# Patient Record
Sex: MALE | Race: WHITE | NOT HISPANIC OR LATINO | Employment: FULL TIME | URBAN - METROPOLITAN AREA
[De-identification: names, ages, dates, MRNs, and addresses within clinical notes are randomized per-mention and may not be internally consistent; named-entity substitution may affect disease eponyms.]

---

## 2018-09-07 ENCOUNTER — OFFICE VISIT (OUTPATIENT)
Dept: FAMILY MEDICINE | Facility: CLINIC | Age: 53
End: 2018-09-07
Payer: COMMERCIAL

## 2018-09-07 VITALS
SYSTOLIC BLOOD PRESSURE: 126 MMHG | WEIGHT: 279.31 LBS | HEART RATE: 84 BPM | DIASTOLIC BLOOD PRESSURE: 78 MMHG | BODY MASS INDEX: 39.1 KG/M2 | TEMPERATURE: 98 F | OXYGEN SATURATION: 91 % | HEIGHT: 71 IN

## 2018-09-07 DIAGNOSIS — Z72.0 CHEWS TOBACCO REGULARLY: ICD-10-CM

## 2018-09-07 DIAGNOSIS — K13.21 LEUKOPLAKIA, TONGUE: Primary | ICD-10-CM

## 2018-09-07 DIAGNOSIS — E66.9 OBESITY (BMI 35.0-39.9 WITHOUT COMORBIDITY): ICD-10-CM

## 2018-09-07 PROCEDURE — 99999 PR PBB SHADOW E&M-NEW PATIENT-LVL IV: CPT | Mod: PBBFAC,,, | Performed by: FAMILY MEDICINE

## 2018-09-07 PROCEDURE — 99204 OFFICE O/P NEW MOD 45 MIN: CPT | Mod: S$GLB,,, | Performed by: FAMILY MEDICINE

## 2018-09-07 RX ORDER — OXYBUTYNIN CHLORIDE 5 MG/1
1 TABLET ORAL NIGHTLY
Refills: 10 | COMMUNITY
Start: 2018-07-16 | End: 2019-01-18

## 2018-09-07 NOTE — PROGRESS NOTES
Subjective:       Patient ID: Levy Mckoy is a 53 y.o. male.    Chief Complaint: tongue pain        History reviewed. No pertinent past medical history.    Lesion on the tongue; Present for One and half week. Sensitive and hurts. No change  Risk factor: He chews tobacco and has been since he was a teenager.      Family History   Problem Relation Age of Onset    Diabetes Father      Social History     Socioeconomic History    Marital status:      Spouse name: Not on file    Number of children: Not on file    Years of education: Not on file    Highest education level: Not on file   Social Needs    Financial resource strain: Not on file    Food insecurity - worry: Not on file    Food insecurity - inability: Not on file    Transportation needs - medical: Not on file    Transportation needs - non-medical: Not on file   Occupational History    Not on file   Tobacco Use    Smoking status: Never Smoker   Substance and Sexual Activity    Alcohol use: Yes     Comment: very rare    Drug use: No    Sexual activity: Yes     Partners: Female   Other Topics Concern    Not on file   Social History Narrative    Not on file     Outpatient Encounter Medications as of 9/7/2018   Medication Sig Dispense Refill    oxybutynin (DITROPAN) 5 MG Tab Take 1 tablet by mouth every evening.  10    [DISCONTINUED] hydrocodone-acetaminophen 5-325mg (NORCO) 5-325 mg per tablet        No facility-administered encounter medications on file as of 9/7/2018.        Review of Systems   Constitutional: Negative for appetite change and fever.   HENT: Negative for congestion, facial swelling and voice change.    Eyes: Negative for discharge and itching.   Respiratory: Negative for cough, chest tightness and wheezing.    Cardiovascular: Negative.  Negative for chest pain and leg swelling.   Gastrointestinal: Negative for abdominal pain, nausea and vomiting.   Endocrine: Negative for cold intolerance and heat intolerance.  "  Genitourinary: Negative for dysuria and flank pain.   Musculoskeletal: Negative for myalgias and neck stiffness.   Skin: Negative for pallor and rash.   Neurological: Negative for facial asymmetry and weakness.   Psychiatric/Behavioral: Negative for agitation and confusion.       Objective:      /78 (BP Location: Right arm, Patient Position: Sitting, BP Method: Large (Manual))   Pulse 84   Temp 98.4 °F (36.9 °C) (Oral)   Ht 5' 11" (1.803 m)   Wt 126.7 kg (279 lb 5.2 oz)   SpO2 (!) 91%   BMI 38.96 kg/m²   Physical Exam   Constitutional: He is oriented to person, place, and time. He appears well-developed. No distress.   HENT:   Head: Normocephalic and atraumatic.   Right Ear: External ear normal.   Left Ear: External ear normal.   Eyes: Conjunctivae and EOM are normal.   Neck: Neck supple.   Cardiovascular: Normal rate and regular rhythm.   Pulmonary/Chest: Effort normal. No respiratory distress.   Abdominal: Soft. Normal appearance and bowel sounds are normal. There is no hepatosplenomegaly.   Genitourinary:   Genitourinary Comments: deferred   Musculoskeletal: He exhibits no edema.   Neurological: He is alert and oriented to person, place, and time.   Skin: Skin is warm and dry.   Tip of the tongue: .5mm diameter white colored lesion on the tip of the tongue.   No other ulcer noted in the mouth.   Psychiatric: He has a normal mood and affect. His behavior is normal.   Nursing note and vitals reviewed.      Results for orders placed or performed in visit on 10/19/11   CBC auto differential   Result Value Ref Range    WBC 7.97 4.8 - 10.8 K/uL    RBC 5.54 4.60 - 6.20 M/uL    Hemoglobin 14.5 14.0 - 18.0 gm/dl    Hematocrit 45.9 40.0 - 54.0 %    MCV 82.9 82 - 95 fL    MCH 26.2 (L) 27 - 31 pg    MCHC 31.6 (L) 32 - 36 %    RDW 13.9 11.5 - 14.5 %    Gran% 64.1 38 - 73 %    Lymph% 24.7 21 - 44 %    Mono% 10.7 (H) 0.0 - 7.4 %    Eosinophil% 0.1 0.0 - 4.2 %    Basophil% 0.4 0 - 1.9 %    Gran # (ANC) 5.1 1.8 - " 7.7 K/uL    Lymph # 2.0 1 - 4.8 K/uL    Mono # 0.9 (H) 0.0 - 0.8 K/uL    Eos # 0.0 0 - 0.45 K/uL    Baso # 0.0 0.0 - 0.2 K/uL    Platelets 355 (H) 150 - 350 K/uL    MPV 11.8 9.2 - 12.9 fL   Lipid panel   Result Value Ref Range    Cholesterol 225 (H) 120 - 199 mg/dL    Triglycerides 164 (H) 30 - 150 mg/dL    HDL 44 40 - 75 mg/dL    LDL Cholesterol 148.2 63 - 159 mg/dL    HDL/Chol Ratio 19.6 (L) 20 - 50 %    Total Cholesterol/HDL Ratio 5.1 (H) 2.0 - 5.0   ALT (SGPT)   Result Value Ref Range    ALT 32 10 - 44 U/L   Basic metabolic panel   Result Value Ref Range    BUN, Bld 19 6 - 20 mg/dl    Sodium 140 136 - 145 mmol/L    Potassium 4.0 3.5 - 5.1 mmol/L    Chloride 106 95 - 110 mmol/L    CO2 23 23.0 - 29.0 mmol/L    Glucose 91 70 - 110 mg/dl    Creatinine 1.3 0.5 - 1.4 mg/dl    Calcium 9.2 8.7 - 10.5 mg/dl    Anion Gap 16 8 - 16 mmol/L    eGFR if non African American 60 (A) >60 mL/min    eGFR if African American >60 >60 mL/min     Assessment:       1. Leukoplakia, tongue    2. Chews tobacco regularly    3. Obesity (BMI 35.0-39.9 without comorbidity)        Plan:   Leukoplakia, tongue/Tobacco/Obesity   New problem with unknown diagnosis  Advised to stop chewing tobacco as it increases risk of oral cancer.  -     Ambulatory consult to Oral Maxillofacial Surgery-for a biopsy  BMI-38.96-Advised to cut down on calories and loose weight.

## 2018-09-08 NOTE — PATIENT INSTRUCTIONS
What Are Oral Lesions? (Precancerous and Cancerous)  Precancerous oral lesions are abnormal cell growths in or around the mouth. They may become cancer. Cancerous oral lesions are life-threatening cell changes in the mouth. These lesions need to be found early to give you a better chance for a cure.  Signs and symptoms  The signs and symptoms of precancerous and cancerous oral lesions may include:  · A sore in the mouth that doesn`t heal within 2 weeks  · White or red lesions or ulcers on the tongue, gums, or lining of the mouth that don`t go away  · Tenderness or pain in the mouth that persists  Your evaluation  See your dental professional about any sore or pain in the mouth that doesn`t go away within 2 weeks. He or she will ask questions about your medical and dental history. Your entire mouth, including your lips and teeth, will be checked. A biopsy or other tests may also be done.  · A biopsy is the best way to find out if a lesion is precancerous or cancerous. During a biopsy, the area around the lesion will be numbed. A part of the lesion will then be removed and sent to a lab to be examined under a microscope.  · Other tests may be helpful in making the diagnosis. They include:  ¨ Staining. The area in your mouth around the lesion may be stained with a special dye. The dye binds to cancerous cells, staining only these cells. After a few hours, the color from the dye will disappear.  ¨ Cytology. Your dental professional may scrape the surface of the lesion to obtain cells. The cells are then sent to a lab, where they are examined for cancer.  Treatment  Your treatment will depend on the nature of the oral lesion. Your dental or medical professional can tell you about types of treatment, which may include:  · Surgery  · Radiation therapy  · Chemotherapy  · Combination therapy. A combination of surgery, radiation, and chemotherapy used to treat advanced cases of oral cancer.  Prevention  The best way to  catch problems early is to have regular oral checkups. To help reduce your risk for oral cancer, follow the tips below.  · Get oral checkups. Visit your dentist at least 2 times a year.  · Don`t use tobacco. Tobacco use increases the risk for oral cancer. It`s never too late to stop using tobacco.  · Limit alcohol. If you drink a lot of alcohol, you may be at a higher risk for oral cancer.  · Eat a healthy diet. A diet rich in fruits and vegetables may lower your risk for oral cancer.  · Use good oral hygiene. Brush and floss your teeth each day. If you wear dentures, keep them clean.  Date Last Reviewed: 7/15/2015  © 7005-8725 [employer]. [address] [place] [address], [place] [address]. All rights reserved. This information is not intended as a substitute for professional medical care. Always follow your healthcare professional's instructions.

## 2018-09-12 ENCOUNTER — TELEPHONE (OUTPATIENT)
Dept: FAMILY MEDICINE | Facility: CLINIC | Age: 53
End: 2018-09-12

## 2018-09-12 NOTE — TELEPHONE ENCOUNTER
Spoke with patient and informed him of appointment date and time with the oral maxillary surgeon, Dr. Renato Taylor 09/13/18 1:15p. Phone number given to patient in case he decided he did not need the appointment. See message below.

## 2018-09-12 NOTE — TELEPHONE ENCOUNTER
----- Message from Kaelyn Ochoa sent at 9/12/2018 10:19 AM CDT -----  Contact: pt   States he's rtn nurses call and states he did a home remedy and it's better and may cancel the appt with the Dentist and can be reached at [phone]//[email or web address]/dbw

## 2018-12-28 ENCOUNTER — OFFICE VISIT (OUTPATIENT)
Dept: FAMILY MEDICINE | Facility: CLINIC | Age: 53
End: 2018-12-28
Payer: COMMERCIAL

## 2018-12-28 VITALS
TEMPERATURE: 98 F | OXYGEN SATURATION: 96 % | HEIGHT: 71 IN | BODY MASS INDEX: 40.65 KG/M2 | SYSTOLIC BLOOD PRESSURE: 130 MMHG | HEART RATE: 99 BPM | WEIGHT: 290.38 LBS | DIASTOLIC BLOOD PRESSURE: 82 MMHG

## 2018-12-28 DIAGNOSIS — G47.30 SLEEP APNEA, UNSPECIFIED TYPE: ICD-10-CM

## 2018-12-28 DIAGNOSIS — Z00.00 ANNUAL PHYSICAL EXAM: Primary | ICD-10-CM

## 2018-12-28 DIAGNOSIS — Z12.11 COLON CANCER SCREENING: ICD-10-CM

## 2018-12-28 DIAGNOSIS — Z72.0 CHEWING TOBACCO USE: ICD-10-CM

## 2018-12-28 DIAGNOSIS — E66.01 MORBID OBESITY WITH BMI OF 40.0-44.9, ADULT: ICD-10-CM

## 2018-12-28 PROCEDURE — 99396 PREV VISIT EST AGE 40-64: CPT | Mod: S$GLB,,, | Performed by: FAMILY MEDICINE

## 2018-12-28 PROCEDURE — 99999 PR PBB SHADOW E&M-EST. PATIENT-LVL IV: CPT | Mod: PBBFAC,,, | Performed by: FAMILY MEDICINE

## 2018-12-28 NOTE — PROGRESS NOTES
Levy Mckoy 53 y.o. White male    HPI- The patient is presenting today for Annual Exam  52yo male presents today for annual examination. He expresses concern about his weight. Notes that he has had progressive gain of weight since March 2018 (30lbs). 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 He does not exercise routinely. He has not had any consistent health care. Reports stable vision and hearing. Has poor sleep patterns and admits to snoring as well as witnessed apnea. Several years ago he was diagnosed with JULI. He lost weight and symptoms improved. Is interested in pursuing updated sleep study. Is followed by non-[facility] for BPH symptoms. Multiple medications have been prescribed and he has had poor tolerability to treatment. He has never had colonoscopy.     Past Medical History:   Diagnosis Date    BPH (benign prostatic hyperplasia)      Past Surgical History:   Procedure Laterality Date    APPENDECTOMY      CYST REMOVAL      right ankle       Family History   Problem Relation Age of Onset    Diabetes Father      Current Outpatient Medications   Medication Sig Dispense Refill    oxybutynin (DITROPAN) 5 MG Tab Take 1 tablet by mouth every evening.  10     No current facility-administered medications for this visit.      Allergies-  Review of patient's allergies indicates:  No Known Allergies    ROS-   CONSTITUTIONAL- No fever, chills, fatigue or weight loss  HEENT- No vision changes, ear pain, hearing loss  CHEST- No cough, shortness of breath  CV- No chest pain, palpitations, edema  Abdomen- No abdominal pain, change in bowel habits, blood in stool  - No change in urination, no dysuria, no hematuria  Neurologic- No headaches, dizziness, weakness  Musculoskeletal- No joint pain, no back pain, no myalgias  Endocrine- No polydypsia, polyphagia or polyuria, no heat or cold intolerance  Hematologic- No bruising or bleeding, no  "lymphadenopathy  Psych- No change in mood, no trouble with sleep  Integument- No rashes, no skin changes    /82   Pulse 99   Temp 98.3 °F (36.8 °C)   Ht 5' 11" (1.803 m)   Wt 131.7 kg (290 lb 5.5 oz)   SpO2 96%   BMI 40.49 kg/m²     PE-  CONSTITIONAL- Well developed, well nourished, no acute distress, obese  HEENT- Normal cephalic, atraumatic, PERRLA, right ear external- no abnormality, left ear external- no abnormality, right TM- normal to visualization, left TM- normal to visualization, moist mucus membranes, no oropharyngeal abnormality visualized nasal turbinates are clear  Neck- Full range of motion, no thyromegaly, no cervical lymphadenopathy  CV- Normal rate and rhythm, heart sounds normal, no murmurs, rubs or gallops  Chest- Breath sounds are normal and equal bilaterally, normal inspiratory and expiratory efforts  Abdomen- Bowel sounds are equal in all four quadrants, non tender to palpation, soft, no distention  Musculoskeletal- Normal ROM of the extremities, no tenderness  Neurologic- Alert, orientated x 3, cranial nerves intact  Skin- Warm and dry to touch, no rashes, no visible lesions  Psych- Mood and affect normal, Behavior normal    Assessment and Plan-  Annual physical exam  General health screening recommendations were reviewed with the patient in office today. Emphasized healthy eating and regular physical activity. Anticipatory guidance has been provided with regard to age and informational handouts have been given. Next well check is recommended in 1 year, rtc sooner for routine chronic care assessment.   -     TSH; Future; Expected date: 12/28/2018  -     Comprehensive metabolic panel; Future; Expected date: 12/28/2018  -     Lipid panel; Future; Expected date: 12/28/2018  -     Hemoglobin A1c; Future; Expected date: 12/28/2018  -     CBC auto differential; Future; Expected date: 12/28/2018    Sleep apnea, unspecified type  -     Ambulatory Referral to Pulmonology    Morbid obesity " with BMI of 40.0-44.9, adult  Weight loss efforts remain encouraged through diet and lifestyle measures.    Chewing tobacco use  Tobacco cessation is advised.    Colon cancer screening  -     Case request GI: COLONOSCOPY

## 2018-12-28 NOTE — PATIENT INSTRUCTIONS

## 2019-01-02 ENCOUNTER — TELEPHONE (OUTPATIENT)
Dept: FAMILY MEDICINE | Facility: CLINIC | Age: 54
End: 2019-01-02

## 2019-01-02 DIAGNOSIS — Z00.00 ANNUAL PHYSICAL EXAM: Primary | ICD-10-CM

## 2019-01-02 NOTE — TELEPHONE ENCOUNTER
External orders have been placed. Please let patient know it is his responsibility to ensure that I have received results as [facility].

## 2019-01-02 NOTE — TELEPHONE ENCOUNTER
----- Message from Acacia Neumann sent at 1/2/2019  1:30 PM CST -----  Contact: pt  Per the pt insurance he has to have his labs drawn at [facility], please fax the pt orders to [facility] no additional info given, the pt can be reached at [phone]///[email or web address]

## 2019-01-02 NOTE — TELEPHONE ENCOUNTER
Pt is scheduled to have labs on 1/4/18. But he has to have external and [facility]

## 2019-01-07 ENCOUNTER — TELEPHONE (OUTPATIENT)
Dept: FAMILY MEDICINE | Facility: CLINIC | Age: 54
End: 2019-01-07

## 2019-01-07 NOTE — TELEPHONE ENCOUNTER
----- Message from Marizol Melendez sent at 1/7/2019  4:15 PM CST -----  Contact: pt  He's calling stating that his lab work was completed at [facility] and in order for the results to be sent it has to be requested on [facility] letter head paper faxed to [phone], please advise [phone]

## 2019-01-07 NOTE — TELEPHONE ENCOUNTER
Spoke with patient letting him know that he can come in Friday when he gets back in town and sign a release so we can get the test results.

## 2019-01-11 ENCOUNTER — OFFICE VISIT (OUTPATIENT)
Dept: PULMONOLOGY | Facility: CLINIC | Age: 54
End: 2019-01-11
Payer: COMMERCIAL

## 2019-01-11 VITALS
OXYGEN SATURATION: 93 % | RESPIRATION RATE: 18 BRPM | BODY MASS INDEX: 41.12 KG/M2 | HEART RATE: 91 BPM | DIASTOLIC BLOOD PRESSURE: 90 MMHG | HEIGHT: 71 IN | SYSTOLIC BLOOD PRESSURE: 138 MMHG | WEIGHT: 293.75 LBS

## 2019-01-11 DIAGNOSIS — E66.01 CLASS 3 SEVERE OBESITY DUE TO EXCESS CALORIES WITH SERIOUS COMORBIDITY AND BODY MASS INDEX (BMI) OF 40.0 TO 44.9 IN ADULT: Chronic | ICD-10-CM

## 2019-01-11 DIAGNOSIS — G47.33 OSA (OBSTRUCTIVE SLEEP APNEA): Primary | ICD-10-CM

## 2019-01-11 PROBLEM — E66.813 CLASS 3 SEVERE OBESITY DUE TO EXCESS CALORIES WITH SERIOUS COMORBIDITY AND BODY MASS INDEX (BMI) OF 40.0 TO 44.9 IN ADULT: Status: ACTIVE | Noted: 2019-01-11

## 2019-01-11 PROCEDURE — 99999 PR PBB SHADOW E&M-EST. PATIENT-LVL III: CPT | Mod: PBBFAC,,, | Performed by: INTERNAL MEDICINE

## 2019-01-11 PROCEDURE — 99205 OFFICE O/P NEW HI 60 MIN: CPT | Mod: S$GLB,,, | Performed by: INTERNAL MEDICINE

## 2019-01-11 PROCEDURE — 99999 PR PBB SHADOW E&M-EST. PATIENT-LVL III: ICD-10-PCS | Mod: PBBFAC,,, | Performed by: INTERNAL MEDICINE

## 2019-01-11 PROCEDURE — 99205 PR OFFICE/OUTPT VISIT, NEW, LEVL V, 60-74 MIN: ICD-10-PCS | Mod: S$GLB,,, | Performed by: INTERNAL MEDICINE

## 2019-01-11 NOTE — LETTER
January 11, 2019      Lashawn Mccormack MD  [id number] [address] [address]           [facility]  [address] [place] [id number]  Phone: [phone]  Fax: [phone]          Patient: Levy Mckoy   MR Number: [id number]   YOB: 1965   Date of Visit: 1/11/2019       Dear Dr. Lashawn Mccormack:    Thank you for referring Levy Mckoy to me for evaluation. Attached you will find relevant portions of my assessment and plan of care.    If you have questions, please do not hesitate to call me. I look forward to following Levy Mckoy along with you.    Sincerely,    Rubén Pedraza MD    Enclosure  CC:  No Recipients    If you would like to receive this communication electronically, please contact [email or web address] or [phone] to request more information on [employer] Link access.    For providers and/or their staff who would like to refer a patient to [facility], please contact us through our one-stop-shop provider referral line, [facility], at [phone].    If you feel you have received this communication in error or would no longer like to receive these types of communications, please e-mail [email or web address]

## 2019-01-11 NOTE — PATIENT INSTRUCTIONS
Continuous Positive Air Pressure (CPAP)     A mask over the nose gently directs air into the throat to keep the airway open.   Continuous positive air pressure (CPAP) uses gentle air pressure to hold the airway open. CPAP is often the most effective treatment for sleep apnea and severe snoring. It works very well for many people. But keep in mind that it can take several adjustments before the setup is right for you.  How CPAP works  The CPAP machine  is a small portable pump beside the bed. The pump sends air through a hose, which is held over your nose and mouth by a mask. Mild air pressure is gently pushed through your airway. The air pressure nudges sagging tissues aside. This widens the airway so you can breathe better. CPAP may be combined with other kinds of therapy for sleep apnea.  Types of air pressure treatments  There are different types of CPAP. Your doctor or CPAP technician will help you decide which type is best for you:  · Basic CPAP keeps the pressure constant all night long.  · A bilevel device (BiPAP) provides more pressure when you breathe in and less when you breathe out. A BiPAP machine also may be set to provide automatic breaths to maintain breathing if you stop breathing while sleeping.  · An autoCPAP device automatically adjusts pressure throughout the night and in response to changes such as body position, sleep stage, and snoring.  Date Last Reviewed: 8/10/2015  © 4958-7405 The [employer]. [address] [place] [address], [place] [address]. All rights reserved. This information is not intended as a substitute for professional medical care. Always follow your healthcare professional's instructions.

## 2019-01-11 NOTE — PROGRESS NOTES
Subjective:      Patient ID: Levy Mckoy is a 53 y.o. male.    Patient Active Problem List   Diagnosis    Impingement syndrome of right shoulder    JULI (obstructive sleep apnea)    Class 3 severe obesity due to excess calories with serious comorbidity and body mass index (BMI) of 40.0 to 44.9 in adult       Problem list has been reviewed.    Chief Complaint: Sleep Apnea        he has been referred by Lashawn Mccormack* for evaluation for possible obstructive sleep apnea    HPI:   Sleep Apnea:    He presents for a sleep evaluation.    He was diagnosed with JULI  In 2011 by PSG. Apnea + Hypopnea Index = 69.7 events / hr asleep. He was started on CPAP 12 CMWP. He stopped using his after 4 months but resumed his using his CPAP 2 months ago.     He reports that he has gained a significant amount of weight.     [place] Sleepiness Scale   EPWORTH SLEEPINESS SCALE 1/11/2019   Sitting and reading 3   Watching TV 3   Sitting, inactive in a public place (e.g. a theatre or a meeting) 0   As a passenger in a car for an hour without a break 3   Lying down to rest in the afternoon when circumstances permit 3   Sitting and talking to someone 0   Sitting quietly after a lunch without alcohol 0   In a car, while stopped for a few minutes in traffic 0   Total score 12       Reference: Sandy DICK. A new method for measuring daytime sleepiness: The [place]  Sleepiness Scale. Sleep 1991; 14(6):540-5.    STOP-Bang Questionnaire (validated JULI screening questionnaire)  Negative unless checked off.  [x] Snoring    [x]  Tired/Fatigued/Sleepy  [x] Obstruction (apneas/choking)  [x] Pressure (HTN)  [x] BMI >35  [x] Age >50  [x] Neck >40 cm  [x] Gender male     STOP-Bang = 8 (low risk 0-2,high risk 3-8)    References:   STOP Questionnaire   A Tool to Screen Patients for Obstructive Sleep Apnea: JOHNY Lynn., ZARA Moran.B.B.S., Domingo Baeza M.D.,Alea Campos, Ph.D., ZARA Lilly.B.B.S.,_ Yvette  Emiliano Porras.,_ Josephine Reyes M.D., HUNTER BarlowRChaiC.P.C.; Anesthesiology 2008; 108:812-21 Copyright © 2008, the American Society of Anesthesiologists, Inc. [employer]      Neck circumference 46 cm [?JULI risk if >43cm (17in) male or >41cm (15.5 in) female]    Sleep position Supine = rare    Non-supine = frequent  Mouth breathing during sleep - possibly       Previous Report Reviewed: lab reports, office notes and radiology reports     The following portions of the patient's history were reviewed and updated as appropriate: He  has a past medical history of BPH (benign prostatic hyperplasia).  He  has a past surgical history that includes right ankle; Cyst Removal; and Appendectomy.  His family history includes Diabetes in his father.  He  reports that  has never smoked. he has never used smokeless tobacco. He reports that he drinks alcohol. He reports that he does not use drugs.  He has a current medication list which includes the following prescription(s): oxybutynin.  He has No Known Allergies..    Review of Systems   Constitutional: Negative for fever and fatigue.   HENT: Negative for postnasal drip, sinus pressure and hearing loss.    Respiratory: Positive for apnea and snoring. Negative for cough and dyspnea on extertion.    Cardiovascular: Negative for chest pain, palpitations and leg swelling.   Genitourinary: Negative for difficulty urinating and hematuria.   Endocrine: Negative for cold intolerance and heat intolerance.    Musculoskeletal: Positive for arthralgias and back pain. Negative for joint swelling.   Skin: Negative for rash.   Gastrointestinal: Negative for abdominal pain.   Neurological: Negative for dizziness and light-headedness.   Hematological: Negative for adenopathy.   Psychiatric/Behavioral: Negative for confusion.      Objective:     Vitals:    01/11/19 1644   BP: (!) 138/90   Pulse: 91   Resp: 18   SpO2: (!) 93%   Weight: 133.3 kg (293 lb 12.2 oz)  "  Height: 5' 11" (1.803 m)     Body mass index is 40.97 kg/m².    Physical Exam   Constitutional: He is oriented to person, place, and time. He appears well-developed. No distress.   HENT:   Head: Normocephalic and atraumatic.   Mallampati 3   Eyes: Conjunctivae and EOM are normal.   Neck: Neck supple.   18.5 "   Cardiovascular: Normal rate and regular rhythm.   Pulmonary/Chest: Effort normal. No stridor. No respiratory distress. He has no wheezes.   Abdominal: Soft. Normal appearance and bowel sounds are normal. There is no hepatosplenomegaly.   Genitourinary:   Genitourinary Comments: deferred   Musculoskeletal: He exhibits edema.   Neurological: He is alert and oriented to person, place, and time.   Skin: Skin is warm and dry. Capillary refill takes less than 2 seconds.   Psychiatric: He has a normal mood and affect. His behavior is normal.   Nursing note and vitals reviewed.      Personal Diagnostic Review    PSG 06/27/11    1. The diagnostic polysomnography revealed a severe obstructive sleep apnea / hypopnea syndrome, with an Apnea + Hypopnea Index = 69.7 events / hr asleep, (60.7 event related arousals / hr asleep) and significant oxygen desaturation during events (mean SaO2 = 89.8 %; lowest SaO2 = 86 %; waking baseline SaO2 = 98 %). Sporadic, mild snoring was noted.    2. CPAP was initiated at 1:30 pm, The titration polysomnography revealed that 12 cm H2O pressure (C F carlie 3, humidity 2), the highest pressure fully tested, was not quite sufficient, as it reduced the rate of respiratory events 79 % to A + H Index = 14.3 events / hr asleep in 0.8 hrs sleep (0.3 hrs REM sleep). The overall A + H Index was 15.1 / hr asleep, with 3.8 respiratory event - related arousals / hr asleep for the titration trial. The mean SaO2 during events improved significantly to 92.8 %, moderate; lowest SaO2 during events = 88 %, waking baseline SaO2 = 98 %). Snoring was eliminated at 12 cm. Higher pressure would be needed for a " successful treatment outcome, but 14 cm and 16 cm woke Mr. Mckoy up completely, so BiPAP likely would have to be tried. A medium ResMed Mirage Quattro full face CPAP mask was used and was tolerated, but sleep during CPAP was greatly reduced.    3. A repeat PAP titration PSG, probably requiring BiPAP, is recommended, but only after successful habituation to PAP at home (wherein gradually increasing CPAP      X-Ray Chest PA And Lateral:   Findings: There are no prior radiographs for comparison.  Heart size is normal.  Lungs are clear bilaterally.  There are no pleural effusions.      Assessment /plan       Discussed diagnosis, its evaluation, treatment and usual course. All questions answered.    Problem List Items Addressed This Visit        Other    JULI (obstructive sleep apnea) - Primary    Current Assessment & Plan     CPAP / BIPAP retitiration to re evaluate PAP therapy         Relevant Orders    CPAP titration (Must have diagnosis of JULI from previous sleep study.)    Class 3 severe obesity due to excess calories with serious comorbidity and body mass index (BMI) of 40.0 to 44.9 in adult    Current Assessment & Plan     General weight loss/lifestyle modification strategies discussed (elicit support from others; identify saboteurs; non-food rewards, etc).  Diet interventions: low calorie (1000 kCal/d) deficit diet.               TIME SPENT WITH PATIENT: Time spent: 60 minutes in face to face  discussion concerning diagnosis, prognosis, review of lab and test results, benefits of treatment as well as management of disease, counseling of patient and coordination of care between various health  care providers . Greater than half the time spent was used for coordination of care and counseling of patient.     Follow-up in about 1 month (around 2/11/2019) for JULI, Morbid Obesity.

## 2019-01-14 ENCOUNTER — TELEPHONE (OUTPATIENT)
Dept: ENDOSCOPY | Facility: HOSPITAL | Age: 54
End: 2019-01-14

## 2019-01-18 ENCOUNTER — TELEPHONE (OUTPATIENT)
Dept: FAMILY MEDICINE | Facility: CLINIC | Age: 54
End: 2019-01-18

## 2019-01-18 ENCOUNTER — OFFICE VISIT (OUTPATIENT)
Dept: FAMILY MEDICINE | Facility: CLINIC | Age: 54
End: 2019-01-18
Payer: COMMERCIAL

## 2019-01-18 VITALS
TEMPERATURE: 98 F | WEIGHT: 291.69 LBS | HEIGHT: 72 IN | SYSTOLIC BLOOD PRESSURE: 138 MMHG | DIASTOLIC BLOOD PRESSURE: 82 MMHG | BODY MASS INDEX: 39.51 KG/M2 | RESPIRATION RATE: 17 BRPM | HEART RATE: 100 BPM | OXYGEN SATURATION: 97 %

## 2019-01-18 DIAGNOSIS — Z72.0 CHEWING TOBACCO USE: ICD-10-CM

## 2019-01-18 DIAGNOSIS — N32.81 OAB (OVERACTIVE BLADDER): ICD-10-CM

## 2019-01-18 DIAGNOSIS — G47.33 OSA (OBSTRUCTIVE SLEEP APNEA): ICD-10-CM

## 2019-01-18 DIAGNOSIS — R73.03 PREDIABETES: Primary | ICD-10-CM

## 2019-01-18 DIAGNOSIS — E66.9 OBESITY (BMI 35.0-39.9 WITHOUT COMORBIDITY): ICD-10-CM

## 2019-01-18 PROCEDURE — 99214 PR OFFICE/OUTPT VISIT, EST, LEVL IV, 30-39 MIN: ICD-10-PCS | Mod: S$GLB,,, | Performed by: FAMILY MEDICINE

## 2019-01-18 PROCEDURE — 99999 PR PBB SHADOW E&M-EST. PATIENT-LVL IV: ICD-10-PCS | Mod: PBBFAC,,, | Performed by: FAMILY MEDICINE

## 2019-01-18 PROCEDURE — 99999 PR PBB SHADOW E&M-EST. PATIENT-LVL IV: CPT | Mod: PBBFAC,,, | Performed by: FAMILY MEDICINE

## 2019-01-18 PROCEDURE — 99214 OFFICE O/P EST MOD 30 MIN: CPT | Mod: S$GLB,,, | Performed by: FAMILY MEDICINE

## 2019-01-18 RX ORDER — METFORMIN HYDROCHLORIDE 500 MG/1
500 TABLET, EXTENDED RELEASE ORAL
Qty: 90 TABLET | Refills: 0 | Status: SHIPPED | OUTPATIENT
Start: 2019-01-18 | End: 2019-04-26 | Stop reason: SDUPTHER

## 2019-01-18 NOTE — TELEPHONE ENCOUNTER
----- Message from Chen King sent at 1/18/2019  8:13 AM CST -----  Pt is requesting a call from nurse to discuss labs results.        Please call pt back at [phone]

## 2019-01-18 NOTE — PROGRESS NOTES
Subjective:       Patient ID: Levy Mckoy is a 53 y.o. male.    Chief Complaint: Follow-up    HPI   Follow-up  54yo male presents today for follow-up. He had recent lab assessment with elevated A1c at 6.19. There are no symptoms of hyper or hypoglycemia. He has been making dietary and lifestyle changes within the recent week. He continues to report difficulty with maintaining nutrition. Reports having been evaluated by Urology for OAB symptoms with a trial of Oxybutynin which did not afford relief of symptoms. He has not returned for reassessment. Notes no underlying prostate issues were identified- records of the assessment are not available at this time. Would like to be referred elsewhere for urinary symptoms. Has not had C scope due to work schedule. Is not planning to pursue at this time. Was evaluated last week per Pulmonology and further follow-up is planned regarding JULI.    Review of Systems   Constitutional: Negative for appetite change, fatigue and unexpected weight change.   HENT: Negative for congestion, ear pain and sinus pressure.    Eyes: Negative for visual disturbance.   Respiratory: Negative for cough and shortness of breath.    Cardiovascular: Negative for chest pain and palpitations.   Gastrointestinal: Negative for abdominal pain, blood in stool and constipation.   Endocrine: Negative for polydipsia, polyphagia and polyuria.   Genitourinary: Negative for difficulty urinating and hematuria.   Musculoskeletal: Negative for arthralgias and myalgias.   Skin: Negative for rash.   Neurological: Negative for dizziness and headaches.   Psychiatric/Behavioral: Positive for sleep disturbance. Negative for dysphoric mood.       Objective:   /82   Pulse 100   Temp 97.6 °F (36.4 °C) (Temporal)   Resp 17   Ht 6' (1.829 m)   Wt 132.3 kg (291 lb 10.7 oz)   SpO2 97%   BMI 39.56 kg/m²   Physical Exam   Constitutional: He is oriented to person, place, and time. He appears well-developed and  well-nourished. No distress.   Obese, non-toxic   HENT:   Head: Normocephalic and atraumatic.   Right Ear: External ear normal.   Left Ear: External ear normal.   Nose: Nose normal.   Mouth/Throat: Oropharynx is clear and moist.   Eyes: Conjunctivae and EOM are normal. Pupils are equal, round, and reactive to light.   Neck: Normal range of motion. Neck supple.   Cardiovascular: Normal rate, regular rhythm and normal heart sounds.   Pulmonary/Chest: Effort normal and breath sounds normal.   Musculoskeletal: He exhibits no edema.   Neurological: He is alert and oriented to person, place, and time.   Skin: Skin is warm and dry. He is not diaphoretic.   Psychiatric: He has a normal mood and affect.       Assessment:       1. Prediabetes    2. OAB (overactive bladder)    3. JULI (obstructive sleep apnea)    4. Obesity (BMI 35.0-39.9 without comorbidity)    5. Chewing tobacco use        Plan:      Prediabetes  Reviewed lab results with the patient. Discussed implications on overall health. Strongly recommend dietary and lifestyle modification. Will arrange for further consultation with a dietician. Will proceed with a trial of Metformin in addition to lifestyle changes. Reviewed timing of administration as well as s/e profile. Will plan to reassess in 3 months- he will obtain external labs and ensure receipt prior to his visit. He knows to contact me in the interim with concerns.   -     metFORMIN (GLUCOPHAGE-XR) 500 MG 24 hr tablet; Take 1 tablet (500 mg total) by mouth daily with breakfast.  Dispense: 90 tablet; Refill: 0  -     Hemoglobin A1c; Future; Expected date: 01/18/2019  -     Basic metabolic panel; Future; Expected date: 01/18/2019  -     Ambulatory Referral to Nutrition Services    OAB (overactive bladder)  -     Ambulatory Referral to Urology    JULI (obstructive sleep apnea)  Proceed as planned per Pulmonology.    Obesity (BMI 35.0-39.9 without comorbidity)  Weight loss efforts remain encouraged through diet  and lifestyle measures.    Chewing tobacco use  Tobacco cessation is advised.

## 2019-02-01 ENCOUNTER — HOSPITAL ENCOUNTER (OUTPATIENT)
Dept: SLEEP MEDICINE | Facility: HOSPITAL | Age: 54
Discharge: HOME | End: 2019-02-01
Attending: INTERNAL MEDICINE
Payer: COMMERCIAL

## 2019-02-01 DIAGNOSIS — G47.33 OBSTRUCTIVE SLEEP APNEA: Primary | ICD-10-CM

## 2019-02-01 DIAGNOSIS — Z72.821 INADEQUATE SLEEP HYGIENE: ICD-10-CM

## 2019-02-01 PROCEDURE — 95811 PR POLYSOMNOGRAPHY W/CPAP: ICD-10-PCS | Mod: 26,,, | Performed by: PSYCHOLOGIST

## 2019-02-01 PROCEDURE — 95811 POLYSOM 6/>YRS CPAP 4/> PARM: CPT | Mod: 26,,, | Performed by: PSYCHOLOGIST

## 2019-02-01 PROCEDURE — 95811 POLYSOM 6/>YRS CPAP 4/> PARM: CPT

## 2019-02-04 ENCOUNTER — TELEPHONE (OUTPATIENT)
Dept: DIABETES | Facility: CLINIC | Age: 54
End: 2019-02-04

## 2019-02-04 NOTE — TELEPHONE ENCOUNTER
Spoke with pt and due to work could not keep Wednesday appt with Michelle Marinelli  Was able to to reschedule with Radha Gupta on Friday, 2/8 as requested by pt

## 2019-02-04 NOTE — TELEPHONE ENCOUNTER
----- Message from Kaelyn Ochoa sent at 2/4/2019 10:09 AM CST -----  Contact: Pt   States he's calling to be worked in with Ms Marinelli on a Friday due to working out of town and can be reached at [phone]//[email or web address]

## 2019-02-04 NOTE — TELEPHONE ENCOUNTER
----- Message from Radha Gupta NP sent at 2/4/2019 12:44 PM CST -----  Hey, you scheduled this patient to be seen by me on Friday. He needs to be rescheduled with the dietician, Vargas instead.  (he has prediabetes). Thanks

## 2019-02-05 NOTE — PROCEDURES
Patient Name: Levy Mckoy  Date of Report: 19  Date of PS2019   [facility] No.: [id number]   : 1965                      Time of PSG:  10:09:19 PM - 5:14:22 AM  Sex:  Male   Age:  53   Weight:  293.0 lbs Height:  5  11          Type of PSG:  CPAP re-titration    REASONS FOR REFERRAL: Mr. Mckoy is a 53 year old male, referred to Dr. Rubén Pedraza and the [facility] [place] of his CPAP by Dr. Lashawn Sunshine.  Dr. Pedraza ordered a CPAP re - titration polysomnography to determine if the patient needs a change in CPAP pressure pursuant to deciding to start CPAP again after being unable to tolerate 8 cm after is split - night PSG on 11 (with an A + H Index = 69.7 events / hr asleep, severe OSAHS; titration found no optimal CPAP pressure tested up to 12 cm).  He had difficulty tolerating CPAP above 14 cm, which was not clinically adequate, and also could not sleep with < 10 cm).  He had lost 30 lbs after stopping CPAP but has since regained it and is now fully symptomatic ([place] Sleepiness Scale 12, clinically significant; STOP - BANG = 8, high risk for JULI).       STUDY PARAMETERS: This study involved analysis of the patient's sleep pattern while breathing was assisted. The study was performed with a sleep technologist in attendance for the entire test period, with video monitoring throughout the study, and routine laboratory clinical parameters recorded:  NOTE: The polysomnography electrophysiological record for the patient has been reviewed in its entirety by Dr. Cohen.    SUMMARY STATEMENTS  DIAGNOSTIC IMPRESSIONS  G47.33  /  327.23  Severe Obstructive Sleep Apnea, Adult (OSAHS)  Z72.821 /  V69.4  Inadequate Sleep Hygiene  F51.04   /  307.42  r/o Psychophysiological Insomnia (stress - related and / or conditioned)       PRIMARY TREATMENT RECOMMENDATIONS  Treat, or refer to Sleep Disorders Center.  1. CPAP was initiated at  10:09 pm.  The CPAP titration polysomnography revealed  that 20 cmH2O pressure (C - Flex 3, humidity 2)   and   BiPAP (Bi - Flex 3, humidity 2) of 20 cm IPAP  /  16 cm EPAP, were the only effective pressures tested, but neither was tested in REM sleep.  20 cm CPAP reduced the rate of respiratory events 92 % to A + H Index = 5.6 events / hr asleep in 0.5 hrs sleep (no REM sleep), and .BiPAP of 20 cm IPAP  /  16 cm EPAP A + H I was 5.2, with no REM sleep in 0.6 hrs sleep).  All other pressures were not adequately effective (with or without REM sleep or sufficient total sleep).  He complained about problems breathing against the higher pressures (apparently over 12 cm CPAP and EPAP).     The overall A + H Index was 26.5 / hr asleep, with  12.8 respiratory event - related arousals / hr asleep (and no RERAs) for the titration trial.  The mean SpO2 value was 78.1 % throughout the study, with a minimum oxygen saturation during sleep of 65 %  (waking baseline SpO2 was 98 %).   The patients own extra - large Respironics Wisp nasal CPAP mask was used and was tolerated,  but  sleep during CPAP was greatly reduced and seriously impaired (extremely high rate of spontaneous transient arousals).  He refuses to use a full - face mask.  Please see PAP trial  outcomes table, below.          2. A repeat PAP titration PSG is recommended,  but  only  after  successful habituation to CPAP  at home (gradually increasing CPAP pressures are used for increasing amounts of time, initially while awake and occupied, and then while asleep). Considerable attention needs to be paid by his DME provider, and / or [facility] provider, regarding a properly fitted and comfortable mask before another titration is attempted.  Full -face masks should be tried.  3. Weight loss to the normal range is recommended as it can decrease respiratory events and snoring in overweight patients.  4. The following changes in sleep hygiene / sleep - related behavior are recommended after medical treatments are  successful   Regular bedtimes and wake times, including weekends: Total sleep time / night should not be more than one hour more             than usual, and bedtime or wake time should not be more than one hour earlier or later than usual.     Do not attempt to make up lost sleep by extending sleep periods.     Avoid naps; none longer than 20 min or later than mid - afternoon (reports uncontrolled naps).    SECONDARY TREATMENT RECOMMENDATIONS  Treat, or refer to SDC if problems are not satisfactorily resolved by the above.  1. Mr. Mckoy had severe alpha intrusion during his dx PSG.  However, there was little alpha intrusion in slow wave sleep (alpha - delta sleep), which reduces the restorativeness of sleep (it was rare in N2 sleep, and light in N3 sleep, the densest slow wave sleep, which was scarce);.    2. Follow - up inquiry regarding frequency, duration and nature of reported severe sleep loss (r/o  stress - related and / or conditioned psychophysiological insomnia).  Please see SDI.    See below for a complete interpretation of data from the polysomnography and Sleep Disorders Inventory.     Thank you for referring this patient to the [facility].      Seamus Cohen, Ph.D., ABPP; Diplomate, American Board of Sleep Medicine

## 2019-02-08 ENCOUNTER — CLINICAL SUPPORT (OUTPATIENT)
Dept: DIABETES | Facility: CLINIC | Age: 54
End: 2019-02-08
Payer: COMMERCIAL

## 2019-02-08 VITALS — WEIGHT: 294.31 LBS | BODY MASS INDEX: 39.86 KG/M2 | HEIGHT: 72 IN

## 2019-02-08 DIAGNOSIS — R73.03 PREDIABETES: Primary | ICD-10-CM

## 2019-02-08 PROCEDURE — 99999 PR PBB SHADOW E&M-EST. PATIENT-LVL II: CPT | Mod: PBBFAC,,, | Performed by: DIETITIAN, REGISTERED

## 2019-02-08 PROCEDURE — 97802 MEDICAL NUTRITION INDIV IN: CPT | Mod: S$GLB,,, | Performed by: DIETITIAN, REGISTERED

## 2019-02-08 PROCEDURE — 97802 PR MED NUTR THER, 1ST, INDIV, EA 15 MIN: ICD-10-PCS | Mod: S$GLB,,, | Performed by: DIETITIAN, REGISTERED

## 2019-02-08 PROCEDURE — 99999 PR PBB SHADOW E&M-EST. PATIENT-LVL II: ICD-10-PCS | Mod: PBBFAC,,, | Performed by: DIETITIAN, REGISTERED

## 2019-02-08 RX ORDER — INSULIN PUMP SYRINGE, 3 ML
EACH MISCELLANEOUS
Qty: 1 EACH | Refills: 0 | Status: SHIPPED | OUTPATIENT
Start: 2019-02-08 | End: 2022-10-26

## 2019-02-08 RX ORDER — LANCETS
1 EACH MISCELLANEOUS ONCE
Qty: 100 EACH | Refills: 11 | Status: SHIPPED | OUTPATIENT
Start: 2019-02-08 | End: 2019-02-08

## 2019-02-08 NOTE — LETTER
February 8, 2019        Lashawn Mccormack MD  [address] [address] [address]             [facility]  [address] [place] [address]  Phone: [phone]  Fax: [phone]   Patient: Levy Mckoy   MR Number: [id number]   YOB: 1965   Date of Visit: 2/8/2019       Dear Dr. Mccormack:    Thank you for referring Levy Mckoy to me for evaluation. Below are the relevant portions of my assessment and plan of care.      If you have questions, please do not hesitate to call me. I look forward to following Levy Choudhury along with you.    Sincerely,      Vargas Vila RD           CC  No Recipients

## 2019-02-08 NOTE — PROGRESS NOTES
Diabetes Education  Author: Vargas Vila RD  Date: 2/8/2019    Diabetes Care Management Summary  Diabetes Education Record Assessment/Progress: Initial  Current Diabetes Risk Level: Low     Referring Provider: Lashawn Mccormack*  53 y.o. male in clinic today with wife for diabetes education. Patient has not taken diabetes education courses in the past.   Pt has already started eating much less than before. However, he has not lost any wt.     Weight: 133.5 kg (294 lb 5 oz)   Height: 6' (182.9 cm)   BMI = 39.92    No results found for: LABA1C, HGBA1C      Diabetes Type  Diabetes Type : Pre-Diabetes    Diabetes History  Diabetes Diagnosis: 0-1 year  Current Treatment: Diet  Reviewed Problem List with Patient: No  DM medications:  Metformin ER, 500 mg, 1 tablet in am     Health Maintenance was reviewed today with patient. Discussed with patient importance of routine eye exams, foot exams/foot care, blood work (i.e.: A1c, microalbumin, and lipid), dental visits, yearly flu vaccine, and pneumonia vaccine as indicated by PCP. Patient verbalized understanding.     The patient has no Health Maintenance topics of status Not Due  Health Maintenance Due   Topic Date Due    TETANUS VACCINE  03/12/1984    Colonoscopy  03/12/2016    Lipid Panel  10/19/2016    Influenza Vaccine  08/01/2018       Nutrition  Meal Planning: water, 3 meals per day, diet drinks  What type of sweetener do you use?: Splenda  What type of beverages do you drink?: milk, diet soda/tea(before dx, was drinking a lot of regular sodas, 3/day)  Meal Plan 24 Hour Recall - Breakfast: 2 eggs, English muffin, ham, 8 oz chocolate milk   Meal Plan 24 Hour Recall - Lunch: grilled pork chop, mary greens, cabbage, unsweet tea w/ Splenda  Meal Plan 24 Hour Recall - Dinner: BK double Whopper, no bread, unsweet tea w/Splenda  Meal Plan 24 Hour Recall - Snack: water during the day     Sometimes eats big breakfast and skips lunch; has dinner by 6:30 or  7p.    Monitoring   Self Monitoring : not yet checking  Blood Glucose Logs: No  Do you use a personal continuous glucose monitor?: No  In the last month, how often have you had a low blood sugar reaction?: never    Exercise   Exercise Type: none    Current Diabetes Treatment   Current Treatment: Diet    Social History  Primary Support: Self, Spouse  Occupation:   Smoking Status: Never a Smoker  Alcohol Use: Rarely                                Barriers to Change  Barriers to Change: None  Learning Challenges : None    Readiness to Learn   Readiness to Learn : Acceptance         Diabetes Education Assessment/Progress  Diabetes Disease Process (diabetes disease process and treatment options): Discussion, Individual Session  Nutrition (Incorporating nutritional management into one's lifestyle): Discussion, Instructed, Individual Session, Demonstrates Understanding/Competency (verbalizes/demonstrates), Written Materials Provided  Physical Activity (incorporating physical activity into one's lifestyle): Discussion, Individual Session  Medications (states correct name, dose, onset, peak, duration, side effects & timing of meds): Discussion, Individual Session  Monitoring (monitoring blood glucose/other parameters & using results): Discussion, Individual Session, Demonstrates Understanding/Competency (verbalizes/demonstrates), Written Materials Provided  Acute Complications (preventing, detecting, and treating acute complications): Discussion, Individual Session  Chronic Complications (preventing, detecting, and treating chronic complications): Discussion, Individual Session  Clinical (diabetes, other pertinent medical history, and relevant comorbidities reviewed during visit): Discussion, Individual Session  Cognitive (knowledge of self-management skills, functional health literacy): Discussion, Individual Session  Psychosocial (emotional response to diabetes): Discussion, Individual Session  Diabetes  Distress and Support Systems: Not Applicable  Behavioral (readiness for change, lifestyle practices, self-care behaviors): Discussion, Individual Session    Goals  Patient has selected/evaluated goals during today's session: Yes, selected  Healthy Eating: Set(limit portions of carbs and fats; Follow meal plan; SF beverages only)  Start Date: 02/08/19  Target Date: 05/08/19  Physical Activity: Set(walk 10 min daily)  Start Date: 02/08/19  Target Date: 05/08/19  Monitoring: Set(check fasting bg 3-4 times per wk)  Start Date: 02/08/19  Target Date: 05/08/19         Diabetes Care Plan/Intervention  Education Plan/Intervention: Individual Follow-Up DSMT   F/u in 4 wks    Diabetes Meal Plan  Calories: 2000  Carbohydrate Per Meal: 45-60g  Carbohydrate Per Snack : 15-30g    Today's Self-Management Care Plan was developed with the patient's input and is based on barriers identified during today's assessment.    The long and short-term goals in the care plan were written with the patient/caregiver's input. The patient has agreed to work toward these goals to improve his overall diabetes control.      The patient received a copy of today's self-management plan and verbalized understanding of the care plan, goals, and all of today's instructions.      The patient was encouraged to communicate with his physician and care team regarding his condition(s) and treatment.  I provided the patient with my contact information today and encouraged him to contact me via phone or patient portal as needed.     Education Units of Time   Time Spent: 60 min

## 2019-02-15 ENCOUNTER — OFFICE VISIT (OUTPATIENT)
Dept: PULMONOLOGY | Facility: CLINIC | Age: 54
End: 2019-02-15
Payer: COMMERCIAL

## 2019-02-15 VITALS
RESPIRATION RATE: 18 BRPM | SYSTOLIC BLOOD PRESSURE: 148 MMHG | BODY MASS INDEX: 39.59 KG/M2 | DIASTOLIC BLOOD PRESSURE: 82 MMHG | WEIGHT: 292.31 LBS | OXYGEN SATURATION: 94 % | HEART RATE: 97 BPM | HEIGHT: 72 IN

## 2019-02-15 DIAGNOSIS — E66.01 CLASS 3 SEVERE OBESITY DUE TO EXCESS CALORIES WITH SERIOUS COMORBIDITY AND BODY MASS INDEX (BMI) OF 40.0 TO 44.9 IN ADULT: ICD-10-CM

## 2019-02-15 DIAGNOSIS — G47.33 OBSTRUCTIVE SLEEP APNEA: Primary | ICD-10-CM

## 2019-02-15 PROCEDURE — 99999 PR PBB SHADOW E&M-EST. PATIENT-LVL III: CPT | Mod: PBBFAC,,, | Performed by: INTERNAL MEDICINE

## 2019-02-15 PROCEDURE — 99215 OFFICE O/P EST HI 40 MIN: CPT | Mod: S$GLB,,, | Performed by: INTERNAL MEDICINE

## 2019-02-15 PROCEDURE — 99999 PR PBB SHADOW E&M-EST. PATIENT-LVL III: ICD-10-PCS | Mod: PBBFAC,,, | Performed by: INTERNAL MEDICINE

## 2019-02-15 PROCEDURE — 99215 PR OFFICE/OUTPT VISIT, EST, LEVL V, 40-54 MIN: ICD-10-PCS | Mod: S$GLB,,, | Performed by: INTERNAL MEDICINE

## 2019-02-15 NOTE — PROGRESS NOTES
Subjective:      Patient ID: Levy Mckoy is a 53 y.o. male.  Patient Active Problem List   Diagnosis    Impingement syndrome of right shoulder    Obstructive sleep apnea    Class 3 severe obesity due to excess calories with serious comorbidity and body mass index (BMI) of 40.0 to 44.9 in adult    Inadequate sleep hygiene       Problem list has been reviewed.    Chief Complaint: Sleep Apnea    HPI: he is here to review overnight CPAP titration PSG results. PAP titration of 02/01/19 reviewed with patient who voiced understanding.he states that he  is at his  respiratory baseline and denies any specific pulmonary complaints. he  denies cough sputum, hemoptysis,  pain with breathing, wheezing, asthma.  A full  review of systems, past , family  and social histories was performed except as mentioned in the note above, these are non contributory to the main issues discussed today.  Patient reports  snoring and  lack of concentration    Previous Report Reviewed: lab reports and office notes     The following portions of the patient's history were reviewed and updated as appropriate: He  has a past medical history of BPH (benign prostatic hyperplasia).  He  has a past surgical history that includes right ankle; Cyst Removal; and Appendectomy.  His family history includes Diabetes in his father.  He  reports that  has never smoked. he has never used smokeless tobacco. He reports that he drinks alcohol. He reports that he does not use drugs.  He has a current medication list which includes the following prescription(s): blood-glucose meter and metformin.  He has No Known Allergies..    Review of Systems   Constitutional: Negative for fever and fatigue.   HENT: Negative for postnasal drip, sinus pressure and hearing loss.    Respiratory: Positive for apnea and snoring. Negative for cough and dyspnea on extertion.    Cardiovascular: Negative for chest pain, palpitations and leg swelling.   Genitourinary: Negative for  "difficulty urinating and hematuria.   Endocrine: Negative for cold intolerance and heat intolerance.    Musculoskeletal: Positive for arthralgias and back pain. Negative for joint swelling.   Skin: Negative for rash.   Gastrointestinal: Negative for abdominal pain.   Neurological: Negative for dizziness and light-headedness.   Hematological: Negative for adenopathy.   Psychiatric/Behavioral: Negative for confusion.        Objective:     Vitals:    02/15/19 1608   BP: (!) 148/82   Pulse: 97   Resp: 18   SpO2: (!) 94%   Weight: 132.6 kg (292 lb 5.3 oz)   Height: 6' (1.829 m)     Body mass index is 39.65 kg/m².     Physical Exam   Constitutional: He is oriented to person, place, and time. He appears well-developed. No distress.   HENT:   Head: Normocephalic and atraumatic.   Mallampati 3   Eyes: Conjunctivae and EOM are normal.   Neck: Neck supple.   18.5 "   Cardiovascular: Normal rate and regular rhythm.   Pulmonary/Chest: Effort normal. No stridor. No respiratory distress. He has no wheezes.   Abdominal: Soft. Normal appearance and bowel sounds are normal. There is no hepatosplenomegaly.   Genitourinary:   Genitourinary Comments: deferred   Musculoskeletal: He exhibits edema.   Neurological: He is alert and oriented to person, place, and time.   Skin: Skin is warm and dry. Capillary refill takes less than 2 seconds.   Psychiatric: He has a normal mood and affect. His behavior is normal.   Nursing note and vitals reviewed.      Personal Diagnostic Review  PSG:     CPAP was initiated at 10:09 pm. The CPAP titration polysomnography revealed that 20 cmH2O pressure (C - Flex 3, humidity 2)  and BiPAP (Bi - Flex 3, humidity 2) of 20 cm IPAP / 16 cm EPAP, were the only effective pressures tested, but neither was tested  in REM sleep. 20 cm CPAP reduced the rate of respiratory events 92 % to A + H Index = 5.6 events / hr asleep in 0.5 hrs sleep (no  REM sleep), and .BiPAP of 20 cm IPAP / 16 cm EPAP A + H I was 5.2, with no " REM sleep in 0.6 hrs sleep). All other pressures  were not adequately effective (with or without REM sleep or sufficient total sleep). He complained about problems breathing against  the higher pressures (apparently over 12 cm CPAP and EPAP). The overall A + H Index was 26.5 / hr asleep, with 12.8 respiratory event - related arousals / hr asleep (and no RERAs) for the titration trial. The mean SpO2 value was 78.1 % throughout the study, with a minimum oxygen saturation during sleep of 65 % (waking baseline SpO2 was 98 %). The patients own extra - large Respironics Wisp nasal CPAP mask was used and was tolerated, but sleep during CPAP was greatly reduced and seriously impaired (extremely high rate of spontaneous transient arousals). He refuses to use a full - face Mask.      Assessment / plan :       Problem List Items Addressed This Visit        Other    Obstructive sleep apnea    Current Assessment & Plan     PAP habituation using AUTOPAP 10 - 20 CMWP.    Using a full face mask.    Re evaluate in 4 weeks.            Class 3 severe obesity due to excess calories with serious comorbidity and body mass index (BMI) of 40.0 to 44.9 in adult - Primary    Current Assessment & Plan     General weight loss/lifestyle modification strategies discussed (elicit support from others; identify saboteurs; non-food rewards, etc).  Diet interventions: low calorie (1000 kCal/d) deficit diet.               TIME SPENT WITH PATIENT: Time spent: 35 minutes in face to face  discussion concerning diagnosis, prognosis, review of lab and test results, benefits of treatment as well as management of disease, counseling of patient and coordination of care between various health  care providers . Greater than half the time spent was used for coordination of care and counseling of patient.     Follow-up in about 1 month (around 3/15/2019) for JULI, Obesity.      .

## 2019-02-15 NOTE — ASSESSMENT & PLAN NOTE
PAP habituation using AUTOPAP 10 - 20 CMWP.    Using a full face mask.    Re evaluate in 4 weeks.

## 2019-02-15 NOTE — PATIENT INSTRUCTIONS
Obstructive Sleep Apnea  Obstructive sleep apnea is a condition that causes your air passages to become narrowed or blocked during sleep. As a result, breathing stops for short periods. Your body wakes up enough for breathing to begin again, though you don't remember it. The cycle of stopped breathing and brief awakenings can repeat dozens of times a night. This prevents the body from getting to the deeper stages of sleep that are needed for good rest and may cause your body's oxygen level to fall.  Signs of sleep apnea include loud snoring, noisy breathing, and gasping sounds during sleep. Daytime symptoms include waking up tired after a full night's sleep, waking up with headaches, feeling very sleepy or falling asleep during the day, and having problems with memory or concentration.  Risk factors for sleep apnea include:  · Being overweight  · Being a man, or a woman in menopause  · Smoking  · Using alcohol or sedating medicines  · Having enlarged structures in the nose or throat  Home care  Lifestyle changes that can help treat snoring and sleep apnea include the following:  · If you are overweight, lose weight. Talk to your healthcare provider about a weight-loss plan for you.  · Avoid alcohol for 3 to 4 hours before bedtime. Avoid sedating medications. Ask your healthcare provider about the medicines you take.  · If you smoke, talk to your healthcare provider about ways to quit.  · Sleep on your side. This can help prevent gravity from pulling relaxed throat tissues into your breathing passages.  · If you have allergies or sinus problems that block your nose, ask your healthcare provider for help.  Follow-up care  Follow up with your healthcare provider, or as advised. A diagnosis of sleep apnea is made with a sleep study. Your healthcare provider can tell you more about this test.  When to seek medical advice  Sleep apnea can make you more likely to have certain health problems. These include high blood  pressure, heart attack, stroke, and sexual dysfunction. If you have sleep apnea, talk to your healthcare provider about the best treatments for you.  Date Last Reviewed: 4/1/2017  © 6634-7379 [employer]. [address] [place] [address], [place] [address]. All rights reserved. This information is not intended as a substitute for professional medical care. Always follow your healthcare professional's instructions.

## 2019-03-08 ENCOUNTER — NUTRITION (OUTPATIENT)
Dept: DIABETES | Facility: CLINIC | Age: 54
End: 2019-03-08
Payer: COMMERCIAL

## 2019-03-08 VITALS — WEIGHT: 288.69 LBS | HEIGHT: 72 IN | BODY MASS INDEX: 39.1 KG/M2

## 2019-03-08 DIAGNOSIS — R73.03 PREDIABETES: Primary | ICD-10-CM

## 2019-03-08 PROCEDURE — 99999 PR PBB SHADOW E&M-EST. PATIENT-LVL II: CPT | Mod: PBBFAC,,, | Performed by: DIETITIAN, REGISTERED

## 2019-03-08 PROCEDURE — 97802 MEDICAL NUTRITION INDIV IN: CPT | Mod: S$GLB,,, | Performed by: DIETITIAN, REGISTERED

## 2019-03-08 PROCEDURE — 99999 PR PBB SHADOW E&M-EST. PATIENT-LVL II: ICD-10-PCS | Mod: PBBFAC,,, | Performed by: DIETITIAN, REGISTERED

## 2019-03-08 PROCEDURE — 97802 PR MED NUTR THER, 1ST, INDIV, EA 15 MIN: ICD-10-PCS | Mod: S$GLB,,, | Performed by: DIETITIAN, REGISTERED

## 2019-03-08 NOTE — PROGRESS NOTES
Diabetes Education  Author: Vargas Vila RD  Date: 3/8/2019    Diabetes Care Management Summary  Diabetes Education Record Assessment/Progress: Comprehensive/Group  Current Diabetes Risk Level: Low     Referring Provider: Lashawn Mccormack*  53 y.o. male in clinic today with wife for diabetes education f/u.   Pt has made changes to diet - eating smaller portions.     Weight: 131 kg (288 lb 11.1 oz)   Height: 6' (182.9 cm)   BMI = 39.92    5.6 lbs lost since last visit on 2/8/19.    No results found for: LABA1C, HGBA1C      Diabetes Type  Diabetes Type : Pre-Diabetes    Diabetes History  Diabetes Diagnosis: 0-1 year  Current Treatment: Oral Medication, Diet  Reviewed Problem List with Patient: No  DM medications:  Metformin ER, 500 mg, 1 tablet in am     Health Maintenance was reviewed today with patient. Discussed with patient importance of routine eye exams, foot exams/foot care, blood work (i.e.: A1c, microalbumin, and lipid), dental visits, yearly flu vaccine, and pneumonia vaccine as indicated by PCP. Patient verbalized understanding.     The patient has no Health Maintenance topics of status Not Due  Health Maintenance Due   Topic Date Due    TETANUS VACCINE  03/12/1984    Colonoscopy  03/12/2016    Lipid Panel  10/19/2016    Influenza Vaccine  08/01/2018       Nutrition  Meal Planning: 3 meals per day  What type of sweetener do you use?: Splenda  What type of beverages do you drink?: milk, diet soda/tea(before dx, was drinking a lot of regular sodas, 3/day)  Meal Plan 24 Hour Recall - Breakfast: western omelet w/onions, bellpepper and tomato, 5 oz sausage, 4-6 oz orange juice, 2 spoons potatoes, water  Meal Plan 24 Hour Recall - Lunch: 5-7 oz meatloaf, turnip greens, cabbage, Coke Zero  Meal Plan 24 Hour Recall - Dinner: 1/2 baby back rack ribs, broccoli, loaded mashed potato, unsweet tea    Meal Plan 24 Hour Recall - Snack: none     Sometimes eats big breakfast and skips lunch; has dinner by  6:30 or 7p.    Monitoring   Self Monitoring : checks 3x/wk, usually 1 hour after eating // per pt, bg has been good but cannot remember specific numbers   Blood Glucose Logs: No(left at home)  Do you use a personal continuous glucose monitor?: No    Exercise   Exercise Type: walking(hasn't done a lot this past week with cold weather)  Intensity: Low  Frequency: Daily(before this week )  Duration: (walks 2 miles in ~30 min)    Current Diabetes Treatment   Current Treatment: Oral Medication, Diet    Social History  Primary Support: Self, Spouse  Occupation:                                 Barriers to Change  Barriers to Change: None  Learning Challenges : None    Readiness to Learn   Readiness to Learn : Eager         Diabetes Education Assessment/Progress  Diabetes Disease Process (diabetes disease process and treatment options): Discussion, Individual Session  Nutrition (Incorporating nutritional management into one's lifestyle): Discussion, Individual Session, Demonstrates Understanding/Competency (verbalizes/demonstrates)  Physical Activity (incorporating physical activity into one's lifestyle): Discussion, Individual Session  Medications (states correct name, dose, onset, peak, duration, side effects & timing of meds): Discussion, Individual Session  Monitoring (monitoring blood glucose/other parameters & using results): Discussion, Individual Session  Acute Complications (preventing, detecting, and treating acute complications): Not Covered/Deferred  Chronic Complications (preventing, detecting, and treating chronic complications): Discussion, Individual Session  Clinical (diabetes, other pertinent medical history, and relevant comorbidities reviewed during visit): Discussion, Individual Session  Cognitive (knowledge of self-management skills, functional health literacy): Discussion, Individual Session  Psychosocial (emotional response to diabetes): Discussion, Individual Session  Diabetes  Distress and Support Systems: Not Applicable  Behavioral (readiness for change, lifestyle practices, self-care behaviors): Discussion, Individual Session    Goals  Patient has selected/evaluated goals during today's session: Yes, selected  Healthy Eating: In Progress(limit portions of carbs and fats; Follow meal plan; SF beverages only)  Physical Activity: In Progress(walk 10 min daily)  Monitoring: In Progress(check fasting bg 3-4 times per wk)    Encouraged to choose lower fat foods to decrease total calories.        Diabetes Care Plan/Intervention  Education Plan/Intervention: Individual Follow-Up DSMT   F/u in 4 wks    Diabetes Meal Plan  Calories: 2000  Carbohydrate Per Meal: 45-60g  Carbohydrate Per Snack : 15-30g    Today's Self-Management Care Plan was developed with the patient's input and is based on barriers identified during today's assessment.    The long and short-term goals in the care plan were written with the patient/caregiver's input. The patient has agreed to work toward these goals to improve his overall diabetes control.      The patient received a copy of today's self-management plan and verbalized understanding of the care plan, goals, and all of today's instructions.      The patient was encouraged to communicate with his physician and care team regarding his condition(s) and treatment.  I provided the patient with my contact information today and encouraged him to contact me via phone or patient portal as needed.     Education Units of Time   Time Spent: 30 min

## 2019-03-29 ENCOUNTER — OFFICE VISIT (OUTPATIENT)
Dept: PULMONOLOGY | Facility: CLINIC | Age: 54
End: 2019-03-29
Payer: COMMERCIAL

## 2019-03-29 VITALS
OXYGEN SATURATION: 93 % | DIASTOLIC BLOOD PRESSURE: 80 MMHG | RESPIRATION RATE: 18 BRPM | HEIGHT: 72 IN | HEART RATE: 94 BPM | WEIGHT: 292.69 LBS | BODY MASS INDEX: 39.64 KG/M2 | SYSTOLIC BLOOD PRESSURE: 130 MMHG

## 2019-03-29 DIAGNOSIS — E66.01 CLASS 3 SEVERE OBESITY DUE TO EXCESS CALORIES WITH SERIOUS COMORBIDITY AND BODY MASS INDEX (BMI) OF 40.0 TO 44.9 IN ADULT: Chronic | ICD-10-CM

## 2019-03-29 DIAGNOSIS — G47.33 OBSTRUCTIVE SLEEP APNEA: Primary | Chronic | ICD-10-CM

## 2019-03-29 PROCEDURE — 99214 PR OFFICE/OUTPT VISIT, EST, LEVL IV, 30-39 MIN: ICD-10-PCS | Mod: S$GLB,,, | Performed by: INTERNAL MEDICINE

## 2019-03-29 PROCEDURE — 99999 PR PBB SHADOW E&M-EST. PATIENT-LVL III: ICD-10-PCS | Mod: PBBFAC,,, | Performed by: INTERNAL MEDICINE

## 2019-03-29 PROCEDURE — 99214 OFFICE O/P EST MOD 30 MIN: CPT | Mod: S$GLB,,, | Performed by: INTERNAL MEDICINE

## 2019-03-29 PROCEDURE — 99999 PR PBB SHADOW E&M-EST. PATIENT-LVL III: CPT | Mod: PBBFAC,,, | Performed by: INTERNAL MEDICINE

## 2019-03-29 NOTE — PATIENT INSTRUCTIONS
Obstructive Sleep Apnea  Obstructive sleep apnea is a condition that causes your air passages to become narrowed or blocked during sleep. As a result, breathing stops for short periods. Your body wakes up enough for breathing to begin again, though you don't remember it. The cycle of stopped breathing and brief awakenings can repeat dozens of times a night. This prevents the body from getting to the deeper stages of sleep that are needed for good rest and may cause your body's oxygen level to fall.  Signs of sleep apnea include loud snoring, noisy breathing, and gasping sounds during sleep. Daytime symptoms include waking up tired after a full night's sleep, waking up with headaches, feeling very sleepy or falling asleep during the day, and having problems with memory or concentration.  Risk factors for sleep apnea include:  · Being overweight  · Being a man, or a woman in menopause  · Smoking  · Using alcohol or sedating medicines  · Having enlarged structures in the nose or throat  Home care  Lifestyle changes that can help treat snoring and sleep apnea include the following:  · If you are overweight, lose weight. Talk to your healthcare provider about a weight-loss plan for you.  · Avoid alcohol for 3 to 4 hours before bedtime. Avoid sedating medications. Ask your healthcare provider about the medicines you take.  · If you smoke, talk to your healthcare provider about ways to quit.  · Sleep on your side. This can help prevent gravity from pulling relaxed throat tissues into your breathing passages.  · If you have allergies or sinus problems that block your nose, ask your healthcare provider for help.  Follow-up care  Follow up with your healthcare provider, or as advised. A diagnosis of sleep apnea is made with a sleep study. Your healthcare provider can tell you more about this test.  When to seek medical advice  Sleep apnea can make you more likely to have certain health problems. These include high blood  pressure, heart attack, stroke, and sexual dysfunction. If you have sleep apnea, talk to your healthcare provider about the best treatments for you.  Date Last Reviewed: 4/1/2017  © 4643-9006 [employer]. [address] [place] [address], [place] [address]. All rights reserved. This information is not intended as a substitute for professional medical care. Always follow your healthcare professional's instructions.

## 2019-03-29 NOTE — PROGRESS NOTES
Subjective:      Patient ID: Levy Mckoy is a 54 y.o. male.  Patient Active Problem List   Diagnosis    Impingement syndrome of right shoulder    Obstructive sleep apnea    Class 3 severe obesity due to excess calories with serious comorbidity and body mass index (BMI) of 40.0 to 44.9 in adult    Inadequate sleep hygiene       Problem list has been reviewed.    Chief Complaint: Sleep Apnea    HPI:  He is on APAP 10 - 20 CMWP.  He is compliant with APAP. He  definitely thinks PAP is beneficial to his  health and he wants to continue with PAP therapy. he states that he  is at his  respiratory baseline and denies any specific pulmonary complaints. he  denies cough sputum, hemoptysis,  pain with breathing, wheezing, asthma.   Patient reports  snoring and  lack of concentration      Compliance Summary  2/27/2019 - 3/28/2019 (30 days)  Days with Device Usage 30 days  Days without Device Usage 0 days  Percent Days with Device Usage 100.0%  Cumulative Usage 8 days 12 hrs. 36 mins. 26 secs.  Maximum Usage (1 Day) 9 hrs. 52 mins. 25 secs.  Average Usage (All Days) 6 hrs. 49 mins. 12 secs.  Average Usage (Days Used) 6 hrs. 49 mins. 12 secs.  Minimum Usage (1 Day) 4 hrs. 14 mins. 17 secs.  Percent of Days with Usage >= 4 Hours 100.0%  Percent of Days with Usage < 4 Hours 0.0%  Date Range  Total Blower Time 8 days 12 hrs. 36 mins. 26 secs.  Average AHI 4.2  Auto-CPAP Summary ([facility] [employer])  Auto-CPAP Mean Pressure 11.7 cmH2O  Auto-CPAP Peak Average Pressure 14.9 cmH2O  Average Device Pressure <= 90% of Time 13.8 cmH2O  Average Time in Large Leak Per Day 0 secs.    Durand Sleepiness Scale   EPWORTH SLEEPINESS SCALE 3/29/2019 2/15/2019 1/11/2019   Sitting and reading 0 3 3   Watching TV 0 2 3   Sitting, inactive in a public place (e.g. a theatre or a meeting) 0 2 0   As a passenger in a car for an hour without a break 0 3 3   Lying down to rest in the afternoon when circumstances permit 0 3 3   Sitting and  talking to someone 0 1 0   Sitting quietly after a lunch without alcohol 0 1 0   In a car, while stopped for a few minutes in traffic 0 1 0   Total score 0 16 12       A full  review of systems, past , family  and social histories was performed except as mentioned in the note above, these are non contributory to the main issues discussed today.      Previous Report Reviewed: lab reports and office notes     The following portions of the patient's history were reviewed and updated as appropriate: He  has a past medical history of BPH (benign prostatic hyperplasia).  He  has a past surgical history that includes right ankle; Cyst Removal; and Appendectomy.  His family history includes Diabetes in his father.  He  reports that he has never smoked. He has never used smokeless tobacco. He reports that he drinks alcohol. He reports that he does not use drugs.  He has a current medication list which includes the following prescription(s): blood-glucose meter and metformin.  He has No Known Allergies..    Review of Systems   Constitutional: Negative for fever and fatigue.   HENT: Negative for postnasal drip, sinus pressure and hearing loss.    Respiratory: Positive for apnea and snoring. Negative for cough and dyspnea on extertion.    Cardiovascular: Negative for chest pain, palpitations and leg swelling.   Genitourinary: Negative for difficulty urinating and hematuria.   Endocrine: Negative for cold intolerance and heat intolerance.    Musculoskeletal: Positive for arthralgias and back pain. Negative for joint swelling.   Skin: Negative for rash.   Gastrointestinal: Negative for abdominal pain.   Neurological: Negative for dizziness and light-headedness.   Hematological: Negative for adenopathy.   Psychiatric/Behavioral: Negative for confusion.        Objective:     Vitals:    03/29/19 1614   BP: 130/80   Pulse: 94   Resp: 18   SpO2: (!) 93%   Weight: 132.7 kg (292 lb 10.6 oz)   Height: 6' (1.829 m)     Body mass index is  "39.69 kg/m².     Physical Exam   Constitutional: He is oriented to person, place, and time. He appears well-developed. No distress.   HENT:   Head: Normocephalic and atraumatic.   Mallampati 3   Eyes: Conjunctivae and EOM are normal.   Neck: Neck supple.   18.5 "   Cardiovascular: Normal rate and regular rhythm.   Pulmonary/Chest: Effort normal. No stridor. No respiratory distress. He has no wheezes.   Abdominal: Soft. Normal appearance and bowel sounds are normal. There is no hepatosplenomegaly.   Genitourinary:   Genitourinary Comments: deferred   Musculoskeletal: He exhibits edema.   Neurological: He is alert and oriented to person, place, and time.   Skin: Skin is warm and dry. Capillary refill takes less than 2 seconds.   Psychiatric: He has a normal mood and affect. His behavior is normal.   Nursing note and vitals reviewed.      Personal Diagnostic Review  CPAP complaince download.    Assessment / plan :       Problem List Items Addressed This Visit        Other    Obstructive sleep apnea - Primary    Current Assessment & Plan     Start APAP of  10 - 20 CMWP. (DME - [facility])     Discussed therapeutic goals for positive airway pressure therapy(CPAP or BiPAP): Ideal is usage 100% of nights for 6 - 8 hours per night. Minimum usage is 70% of night for at least 4 hours per night used. Pateint expressed understanding. All Questions answered.    Complaince download in 6 weeks.          Class 3 severe obesity due to excess calories with serious comorbidity and body mass index (BMI) of 40.0 to 44.9 in adult    Current Assessment & Plan     General weight loss/lifestyle modification strategies discussed (elicit support from others; identify saboteurs; non-food rewards, etc).  Diet interventions: low calorie (1000 kCal/d) deficit diet.  Informal exercise measures discussed, e.g. taking stairs instead of elevator.  Regular aerobic exercise program discussed.               TIME SPENT WITH PATIENT: Time spent: 35 minutes in " face to face  discussion concerning diagnosis, prognosis, review of lab and test results, benefits of treatment as well as management of disease, counseling of patient and coordination of care between various health  care providers . Greater than half the time spent was used for coordination of care and counseling of patient.     Follow up in about 6 weeks (around 5/10/2019) for JULI, Obesity.      .

## 2019-03-29 NOTE — ASSESSMENT & PLAN NOTE
Start APAP of  10 - 20 CMWP. ([facility])     Discussed therapeutic goals for positive airway pressure therapy(CPAP or BiPAP): Ideal is usage 100% of nights for 6 - 8 hours per night. Minimum usage is 70% of night for at least 4 hours per night used. Pateint expressed understanding. All Questions answered.    Complaince download in 6 weeks.

## 2019-04-25 ENCOUNTER — PATIENT OUTREACH (OUTPATIENT)
Dept: ADMINISTRATIVE | Facility: HOSPITAL | Age: 54
End: 2019-04-25

## 2019-04-26 ENCOUNTER — TELEPHONE (OUTPATIENT)
Dept: FAMILY MEDICINE | Facility: CLINIC | Age: 54
End: 2019-04-26

## 2019-04-26 ENCOUNTER — OFFICE VISIT (OUTPATIENT)
Dept: FAMILY MEDICINE | Facility: CLINIC | Age: 54
End: 2019-04-26
Payer: COMMERCIAL

## 2019-04-26 VITALS
TEMPERATURE: 99 F | OXYGEN SATURATION: 95 % | SYSTOLIC BLOOD PRESSURE: 124 MMHG | DIASTOLIC BLOOD PRESSURE: 82 MMHG | BODY MASS INDEX: 38.63 KG/M2 | HEART RATE: 104 BPM | WEIGHT: 285.19 LBS | HEIGHT: 72 IN

## 2019-04-26 DIAGNOSIS — R73.03 PREDIABETES: Primary | ICD-10-CM

## 2019-04-26 DIAGNOSIS — N32.81 OAB (OVERACTIVE BLADDER): ICD-10-CM

## 2019-04-26 DIAGNOSIS — G47.33 OSA (OBSTRUCTIVE SLEEP APNEA): ICD-10-CM

## 2019-04-26 DIAGNOSIS — E66.9 OBESITY (BMI 35.0-39.9 WITHOUT COMORBIDITY): ICD-10-CM

## 2019-04-26 DIAGNOSIS — J31.0 CHRONIC RHINITIS: ICD-10-CM

## 2019-04-26 DIAGNOSIS — Z72.0 CHEWING TOBACCO USE: ICD-10-CM

## 2019-04-26 DIAGNOSIS — B35.1 ONYCHOMYCOSIS: ICD-10-CM

## 2019-04-26 PROCEDURE — 99214 OFFICE O/P EST MOD 30 MIN: CPT | Mod: S$GLB,,, | Performed by: FAMILY MEDICINE

## 2019-04-26 PROCEDURE — 99999 PR PBB SHADOW E&M-EST. PATIENT-LVL III: CPT | Mod: PBBFAC,,, | Performed by: FAMILY MEDICINE

## 2019-04-26 PROCEDURE — 99999 PR PBB SHADOW E&M-EST. PATIENT-LVL III: ICD-10-PCS | Mod: PBBFAC,,, | Performed by: FAMILY MEDICINE

## 2019-04-26 PROCEDURE — 99214 PR OFFICE/OUTPT VISIT, EST, LEVL IV, 30-39 MIN: ICD-10-PCS | Mod: S$GLB,,, | Performed by: FAMILY MEDICINE

## 2019-04-26 RX ORDER — FLUTICASONE PROPIONATE 50 MCG
2 SPRAY, SUSPENSION (ML) NASAL DAILY
Qty: 16 G | Refills: 11 | Status: SHIPPED | OUTPATIENT
Start: 2019-04-26 | End: 2020-02-14

## 2019-04-26 RX ORDER — METFORMIN HYDROCHLORIDE 500 MG/1
500 TABLET, EXTENDED RELEASE ORAL
Qty: 90 TABLET | Refills: 0 | Status: SHIPPED | OUTPATIENT
Start: 2019-04-26 | End: 2019-07-06 | Stop reason: SDUPTHER

## 2019-04-26 NOTE — TELEPHONE ENCOUNTER
Poke with Teetee she stated that she can not find Dr. Mccormack in her provider information to place patient's order for labs in. Teetee was told that she needs to call their IT department and get this fixed. Teetee stated she called in January when this happened and it is still not fixed. I told Teetee to get a supervisor and let them know what is going on. Teetee verbalized understanding

## 2019-04-26 NOTE — TELEPHONE ENCOUNTER
----- Message from Marisa Buckner sent at 4/26/2019  4:27 PM CDT -----  Type:  Needs Medical Advice    Who Called:  Teetee with [facility]  Symptoms (please be specific):     How long has patient had these symptoms:     Pharmacy name and phone #:     Would the patient rather a call back or a response via [facility]?  Call back  Best Call Back Number:  [phone]  Additional Information:  States she has been trying to reach your office//they are needing the Doctor's Information because pt is at their facility right now to have lab work done//please call asap//lenka/clif

## 2019-04-26 NOTE — PROGRESS NOTES
Subjective:       Patient ID: Levy Mckoy is a 54 y.o. male.    Chief Complaint: Follow-up    HPI   Follow-up  55yo male presents today for follow-up. He is accompanied by his wife who is providing some of the history. Patient continues with Metformin use. He has had two visits with the dietician and found these sessions beneficial. He has also succeeded in losing weight since initial diagnosis of prediabetes. No adverse effects of treatment are reported. A1c monitoring is needed but he has not obtained due to work schedule- plans to have labs at [facility] tomorrow. OAB symptoms have resolved. He denies any change in urinary patterns are reports maintaining hydration during the day. He is now using CPAP per Pulmonology and sleep patterns have significantly improved. Wife notes concern for fungal toenail infection and persistent rhinorrhea when eating. Patient continues with chewing tobacco use. No recent ER visits or hospitalizations.    Review of Systems   Constitutional: Negative for chills, fatigue and fever.   HENT: Positive for rhinorrhea. Negative for congestion, ear pain and sinus pressure.    Eyes: Negative for visual disturbance.   Respiratory: Negative for cough and shortness of breath.    Cardiovascular: Negative for chest pain, palpitations and leg swelling.   Gastrointestinal: Negative for abdominal pain, constipation and diarrhea.   Endocrine: Negative for polydipsia and polyuria.   Genitourinary: Negative for decreased urine volume, difficulty urinating and frequency.   Musculoskeletal: Negative for arthralgias and myalgias.   Skin: Negative for rash.   Neurological: Negative for dizziness and headaches.   Psychiatric/Behavioral: Negative for dysphoric mood and sleep disturbance. The patient is not nervous/anxious.        Objective:   /82 (BP Location: Right arm, Patient Position: Sitting)   Pulse 104   Temp 99.3 °F (37.4 °C) (Tympanic)   Ht 6' (1.829 m)   Wt 129.3 kg (285 lb  2.6 oz)   SpO2 95%   BMI 38.68 kg/m²   Physical Exam   Constitutional: He is oriented to person, place, and time. He appears well-developed and well-nourished. No distress.   Obese, non-toxic   HENT:   Head: Normocephalic and atraumatic.   Right Ear: Tympanic membrane, external ear and ear canal normal.   Left Ear: Tympanic membrane, external ear and ear canal normal.   Nose: Nose normal.   Mouth/Throat: Oropharynx is clear and moist.   Eyes: Pupils are equal, round, and reactive to light. Conjunctivae and EOM are normal.   Neck: Normal range of motion. Neck supple.   Cardiovascular: Normal rate, regular rhythm and normal heart sounds.   Pulmonary/Chest: Effort normal and breath sounds normal.   Abdominal: Soft. Bowel sounds are normal.   Musculoskeletal: He exhibits no edema.   Neurological: He is alert and oriented to person, place, and time.   Skin: Skin is warm and dry. He is not diaphoretic.   Psychiatric: He has a normal mood and affect. His behavior is normal.       Assessment:       1. Prediabetes    2. OAB (overactive bladder)    3. JULI (obstructive sleep apnea)    4. Chronic rhinitis    5. Onychomycosis    6. Obesity (BMI 35.0-39.9 without comorbidity)    7. Chewing tobacco use        Plan:      Prediabetes  Proceed with lab update and continue with Metformin in the interim. Additional recommendations for further lab assessment and follow-up to be made pending results.  -     Hemoglobin A1c; Future; Expected date: 04/26/2019  -     Basic metabolic panel; Future; Expected date: 04/26/2019  -     metFORMIN (GLUCOPHAGE-XR) 500 MG 24 hr tablet; Take 1 tablet (500 mg total) by mouth daily with breakfast.  Dispense: 90 tablet; Refill: 0    OAB (overactive bladder)  Improved, Reviewed hydration recommendations.    JULI (obstructive sleep apnea)  As per Pulmonology.    Chronic rhinitis  -     fluticasone (FLONASE) 50 mcg/actuation nasal spray; 2 sprays (100 mcg total) by Each Nare route once daily.  Dispense: 16  g; Refill: 11    Onychomycosis  Discussed Derm eval for nail sampling- spouse will call back for referral once they have determined in network options.    Obesity (BMI 35.0-39.9 without comorbidity)  Weight loss efforts remain encouraged through diet and lifestyle measures.    Chewing tobacco use  Tobacco cessation remains advised.

## 2019-05-10 ENCOUNTER — OFFICE VISIT (OUTPATIENT)
Dept: PULMONOLOGY | Facility: CLINIC | Age: 54
End: 2019-05-10
Payer: COMMERCIAL

## 2019-05-10 VITALS
OXYGEN SATURATION: 95 % | BODY MASS INDEX: 38.89 KG/M2 | HEART RATE: 90 BPM | WEIGHT: 287.13 LBS | DIASTOLIC BLOOD PRESSURE: 84 MMHG | RESPIRATION RATE: 18 BRPM | HEIGHT: 72 IN | SYSTOLIC BLOOD PRESSURE: 132 MMHG

## 2019-05-10 DIAGNOSIS — E66.01 CLASS 3 SEVERE OBESITY DUE TO EXCESS CALORIES WITH SERIOUS COMORBIDITY AND BODY MASS INDEX (BMI) OF 40.0 TO 44.9 IN ADULT: Chronic | ICD-10-CM

## 2019-05-10 DIAGNOSIS — G47.33 OBSTRUCTIVE SLEEP APNEA: Primary | Chronic | ICD-10-CM

## 2019-05-10 PROCEDURE — 99999 PR PBB SHADOW E&M-EST. PATIENT-LVL III: ICD-10-PCS | Mod: PBBFAC,,, | Performed by: INTERNAL MEDICINE

## 2019-05-10 PROCEDURE — 99214 PR OFFICE/OUTPT VISIT, EST, LEVL IV, 30-39 MIN: ICD-10-PCS | Mod: S$GLB,,, | Performed by: INTERNAL MEDICINE

## 2019-05-10 PROCEDURE — 99214 OFFICE O/P EST MOD 30 MIN: CPT | Mod: S$GLB,,, | Performed by: INTERNAL MEDICINE

## 2019-05-10 PROCEDURE — 99999 PR PBB SHADOW E&M-EST. PATIENT-LVL III: CPT | Mod: PBBFAC,,, | Performed by: INTERNAL MEDICINE

## 2019-05-10 NOTE — ASSESSMENT & PLAN NOTE
Start APAP of  10 - 20 CMWP. ([facility] [facility]     Discussed therapeutic goals for positive airway pressure therapy(CPAP or BiPAP): Ideal is usage 100% of nights for 6 - 8 hours per night. Minimum usage is 70% of night for at least 4 hours per night used. Pateint expressed understanding. All Questions answered.    Complaince download in 12 months. .

## 2019-05-10 NOTE — PROGRESS NOTES
Subjective:      Patient ID: Levy Mckoy is a 54 y.o. male.  Patient Active Problem List   Diagnosis    Impingement syndrome of right shoulder    Obstructive sleep apnea    Class 3 severe obesity due to excess calories with serious comorbidity and body mass index (BMI) of 40.0 to 44.9 in adult    Inadequate sleep hygiene       Problem list has been reviewed.    Chief Complaint: Sleep Apnea    HPI:  He is on APAP 10 - 20 CMWP.  He is compliant with APAP. He  definitely thinks PAP is beneficial to his  health and he wants to continue with PAP therapy. he states that he  is at his  respiratory baseline and denies any specific pulmonary complaints. he  denies cough sputum, hemoptysis,  pain with breathing, wheezing, asthma.   Patient reports  snoring and  lack of concentration      Compliance Summary  2/9/2019 - 5/9/2019 (90 days)  Days with Device Usage 89 days  Days without Device Usage 1 day  Percent Days with Device Usage 98.9%  Cumulative Usage 25 days 5 hrs. 24 mins. 2 secs.  Maximum Usage (1 Day) 9 hrs. 52 mins. 25 secs.  Average Usage (All Days) 6 hrs. 43 mins. 36 secs.  Average Usage (Days Used) 6 hrs. 48 mins. 8 secs.  Minimum Usage (1 Day) 1 hrs. 47 mins. 20 secs.  Percent of Days with Usage >= 4 Hours 94.4%  Percent of Days with Usage < 4 Hours 5.6%  Date Range  Total Blower Time 25 days 7 hrs. 14 mins. 6 secs.  Average AHI 5.2  Auto-CPAP Summary ([facility] [employer])  Auto-CPAP Mean Pressure 11.6 cmH2O  Auto-CPAP Peak Average Pressure 17.0 cmH2O  Average Device Pressure <= 90% of Time 13.9 cmH2O  Average Time in Large Leak Per Day 3 secs.      [place] Sleepiness Scale   EPWORTH SLEEPINESS SCALE 5/10/2019 3/29/2019 2/15/2019 1/11/2019   Sitting and reading 1 0 3 3   Watching TV 1 0 2 3   Sitting, inactive in a public place (e.g. a theatre or a meeting) 1 0 2 0   As a passenger in a car for an hour without a break 1 0 3 3   Lying down to rest in the afternoon when circumstances permit 1 0 3 3    Sitting and talking to someone 1 0 1 0   Sitting quietly after a lunch without alcohol 1 0 1 0   In a car, while stopped for a few minutes in traffic 0 0 1 0   Total score 7 0 16 12       A full  review of systems, past , family  and social histories was performed except as mentioned in the note above, these are non contributory to the main issues discussed today.      Previous Report Reviewed: lab reports and office notes     The following portions of the patient's history were reviewed and updated as appropriate: He  has a past medical history of BPH (benign prostatic hyperplasia).  He  has a past surgical history that includes right ankle; Cyst Removal; and Appendectomy.  His family history includes Diabetes in his father.  He  reports that he has never smoked. He has never used smokeless tobacco. He reports that he drinks alcohol. He reports that he does not use drugs.  He has a current medication list which includes the following prescription(s): blood-glucose meter, fluticasone propionate, and metformin.  He has No Known Allergies..    Review of Systems   Constitutional: Negative for fever and fatigue.   HENT: Negative for postnasal drip, sinus pressure and hearing loss.    Respiratory: Positive for apnea and snoring. Negative for cough and dyspnea on extertion.    Cardiovascular: Negative for chest pain, palpitations and leg swelling.   Genitourinary: Negative for difficulty urinating and hematuria.   Endocrine: Negative for cold intolerance and heat intolerance.    Musculoskeletal: Positive for arthralgias and back pain. Negative for joint swelling.   Skin: Negative for rash.   Gastrointestinal: Negative for abdominal pain.   Neurological: Negative for dizziness and light-headedness.   Hematological: Negative for adenopathy.   Psychiatric/Behavioral: Negative for confusion.        Objective:     Vitals:    05/10/19 1508   BP: 132/84   Pulse: 90   Resp: 18   SpO2: 95%   Weight: 130.3 kg (287 lb 2.4 oz)  "  Height: 6' (1.829 m)     Body mass index is 38.94 kg/m².     Physical Exam   Constitutional: He is oriented to person, place, and time. He appears well-developed. No distress.   HENT:   Head: Normocephalic and atraumatic.   Mallampati 3   Eyes: Conjunctivae and EOM are normal.   Neck: Neck supple.   18.5 "   Cardiovascular: Normal rate and regular rhythm.   Pulmonary/Chest: Effort normal. No stridor. No respiratory distress. He has no wheezes.   Abdominal: Soft. Normal appearance and bowel sounds are normal. There is no hepatosplenomegaly.   Genitourinary:   Genitourinary Comments: deferred   Musculoskeletal: He exhibits edema.   Neurological: He is alert and oriented to person, place, and time.   Skin: Skin is warm and dry. Capillary refill takes less than 2 seconds.   Psychiatric: He has a normal mood and affect. His behavior is normal.   Nursing note and vitals reviewed.      Personal Diagnostic Review  CPAP complaince download.    Assessment / plan :       Problem List Items Addressed This Visit        Other    Obstructive sleep apnea - Primary    Current Assessment & Plan     Start APAP of  10 - 20 CMWP. ([facility])     Discussed therapeutic goals for positive airway pressure therapy(CPAP or BiPAP): Ideal is usage 100% of nights for 6 - 8 hours per night. Minimum usage is 70% of night for at least 4 hours per night used. Pateint expressed understanding. All Questions answered.    Complaince download in 12 months.          Class 3 severe obesity due to excess calories with serious comorbidity and body mass index (BMI) of 40.0 to 44.9 in adult    Current Assessment & Plan     Start APAP of  10 - 20 CMWP. ([facility])     Discussed therapeutic goals for positive airway pressure therapy(CPAP or BiPAP): Ideal is usage 100% of nights for 6 - 8 hours per night. Minimum usage is 70% of night for at least 4 hours per night used. Pateint expressed understanding. All Questions answered.    Complaince download in 12 " months. .                TIME SPENT WITH PATIENT: Time spent: 35 minutes in face to face  discussion concerning diagnosis, prognosis, review of lab and test results, benefits of treatment as well as management of disease, counseling of patient and coordination of care between various health  care providers . Greater than half the time spent was used for coordination of care and counseling of patient.     Follow up in about 1 year (around 5/10/2020) for JULI, Obesity.      .

## 2019-05-10 NOTE — ASSESSMENT & PLAN NOTE
Start APAP of  10 - 20 CMWP. ([facility] [place] [facility])     Discussed therapeutic goals for positive airway pressure therapy(CPAP or BiPAP): Ideal is usage 100% of nights for 6 - 8 hours per night. Minimum usage is 70% of night for at least 4 hours per night used. Pateint expressed understanding. All Questions answered.    Complaince download in 12 months.

## 2019-05-10 NOTE — PATIENT INSTRUCTIONS
Continuous Positive Air Pressure (CPAP)     A mask over the nose gently directs air into the throat to keep the airway open.   Continuous positive air pressure (CPAP) uses gentle air pressure to hold the airway open. CPAP is often the most effective treatment for sleep apnea and severe snoring. It works very well for many people. But keep in mind that it can take several adjustments before the setup is right for you.  How CPAP works  The CPAP machine  is a small portable pump beside the bed. The pump sends air through a hose, which is held over your nose and mouth by a mask. Mild air pressure is gently pushed through your airway. The air pressure nudges sagging tissues aside. This widens the airway so you can breathe better. CPAP may be combined with other kinds of therapy for sleep apnea.  Types of air pressure treatments  There are different types of CPAP. Your doctor or CPAP technician will help you decide which type is best for you:  · Basic CPAP keeps the pressure constant all night long.  · A bilevel device (BiPAP) provides more pressure when you breathe in and less when you breathe out. A BiPAP machine also may be set to provide automatic breaths to maintain breathing if you stop breathing while sleeping.  · An autoCPAP device automatically adjusts pressure throughout the night and in response to changes such as body position, sleep stage, and snoring.  Date Last Reviewed: 8/10/2015  © 3830-5916 [employer]. [address] [place] [address], [place] [address]. All rights reserved. This information is not intended as a substitute for professional medical care. Always follow your healthcare professional's instructions.

## 2019-05-13 ENCOUNTER — TELEPHONE (OUTPATIENT)
Dept: FAMILY MEDICINE | Facility: CLINIC | Age: 54
End: 2019-05-13

## 2019-05-13 DIAGNOSIS — R73.03 PREDIABETES: Primary | ICD-10-CM

## 2019-05-13 NOTE — TELEPHONE ENCOUNTER
Stable A1c and BMP results received from [facility]. Continue with Metformin at current dosing. Will plan to reassess in 4 months but will need lab update prior. A1c, CMP and lipid. Print my external orders please. Would advise labs be obtained in advance of appt so that we can review together at time of assessment.

## 2019-07-06 DIAGNOSIS — R73.03 PREDIABETES: ICD-10-CM

## 2019-07-10 RX ORDER — METFORMIN HYDROCHLORIDE 500 MG/1
TABLET, EXTENDED RELEASE ORAL
Qty: 90 TABLET | Refills: 0 | Status: SHIPPED | OUTPATIENT
Start: 2019-07-10 | End: 2019-08-02 | Stop reason: SDUPTHER

## 2019-08-02 ENCOUNTER — TELEPHONE (OUTPATIENT)
Dept: FAMILY MEDICINE | Facility: CLINIC | Age: 54
End: 2019-08-02

## 2019-08-02 DIAGNOSIS — R73.03 PREDIABETES: ICD-10-CM

## 2019-08-02 RX ORDER — METFORMIN HYDROCHLORIDE 500 MG/1
TABLET, EXTENDED RELEASE ORAL
Qty: 90 TABLET | Refills: 0 | Status: SHIPPED | OUTPATIENT
Start: 2019-08-02 | End: 2020-02-14 | Stop reason: SDUPTHER

## 2019-08-02 NOTE — TELEPHONE ENCOUNTER
Pt is scheduled to see Dr Giraldo on 8/16/19. He can not come in sooner and needs a refill. Can you refill his medication until his appt.

## 2019-08-02 NOTE — TELEPHONE ENCOUNTER
----- Message from Neelima Leal sent at 8/2/2019  9:15 AM CDT -----  ..Type:  RX Refill Request    Who Called: self  Refill or New Rx:refill  RX Name and Strength: metformin 500 mg?  How is the patient currently taking it? (ex. 1XDay):1/day  Is this a 30 day or 90 day RX:30  Preferred Pharmacy with phone number:.  [id number] - [place], [place] - [address] [facility] [address]  [address]  [place] [address]  Phone: [phone] Fax: [phone]    Local or Mail Order:local  Ordering Provider:wayne  Would the patient rather a call back or a response via [facility]? call  Best Call Back Number.[phone]  Additional Information:

## 2019-08-16 ENCOUNTER — OFFICE VISIT (OUTPATIENT)
Dept: FAMILY MEDICINE | Facility: CLINIC | Age: 54
End: 2019-08-16
Payer: COMMERCIAL

## 2019-08-16 VITALS
WEIGHT: 273.06 LBS | HEART RATE: 104 BPM | TEMPERATURE: 98 F | OXYGEN SATURATION: 99 % | DIASTOLIC BLOOD PRESSURE: 68 MMHG | RESPIRATION RATE: 16 BRPM | BODY MASS INDEX: 36.99 KG/M2 | SYSTOLIC BLOOD PRESSURE: 124 MMHG | HEIGHT: 72 IN

## 2019-08-16 DIAGNOSIS — R73.03 PREDIABETES: Primary | ICD-10-CM

## 2019-08-16 DIAGNOSIS — E66.9 OBESITY (BMI 35.0-39.9 WITHOUT COMORBIDITY): ICD-10-CM

## 2019-08-16 DIAGNOSIS — G47.33 OSA (OBSTRUCTIVE SLEEP APNEA): ICD-10-CM

## 2019-08-16 PROCEDURE — 99999 PR PBB SHADOW E&M-EST. PATIENT-LVL III: CPT | Mod: PBBFAC,,, | Performed by: FAMILY MEDICINE

## 2019-08-16 PROCEDURE — 99214 PR OFFICE/OUTPT VISIT, EST, LEVL IV, 30-39 MIN: ICD-10-PCS | Mod: S$GLB,,, | Performed by: FAMILY MEDICINE

## 2019-08-16 PROCEDURE — 99214 OFFICE O/P EST MOD 30 MIN: CPT | Mod: S$GLB,,, | Performed by: FAMILY MEDICINE

## 2019-08-16 PROCEDURE — 99999 PR PBB SHADOW E&M-EST. PATIENT-LVL III: ICD-10-PCS | Mod: PBBFAC,,, | Performed by: FAMILY MEDICINE

## 2019-08-16 NOTE — PATIENT INSTRUCTIONS
You are in need of colon cancer screening. [place] will need to either consider a stool kit (FITKIT) as we discussed or have a repeat colonoscopy.

## 2019-08-16 NOTE — PROGRESS NOTES
Subjective:       Patient ID: Levy Mckoy is a 54 y.o. male.    Chief Complaint: Follow-up    HPI   Follow-up  55yo male presents today for follow-up. Since last assessment he has been making dietary modifications. He has lost 12 pounds since April 2019. He has been taking Metformin XR 500mg daily with breakfast. Last labs were obtained in April at which time his A1c was measured at 5.77. There are no symptoms of hyper or hypoglycemia reported. He does not monitor blood glucose levels. Today he has a number of questions regarding dietary and lifestyle modifications. He has previously undergone dietary  which he found to be quite helpful. He and his spouse have been making changes together. They have collectively increased their activity and are more physically active than in the past. He also reports compliance with CPAP use and is resting well. His mood has been stable. He denies any OAB symptoms and actually feels his urinary symptoms to have improved with the CPAP use. There have been no recent ER visits or hospitalizations.    Review of Systems   Constitutional: Positive for activity change. Negative for appetite change, fatigue and unexpected weight change.   HENT: Negative for congestion, ear pain and sinus pressure.    Eyes: Negative for visual disturbance.   Respiratory: Negative for cough and shortness of breath.    Cardiovascular: Negative for chest pain and palpitations.   Gastrointestinal: Negative for abdominal pain, constipation, diarrhea and nausea.   Endocrine: Negative for polydipsia, polyphagia and polyuria.   Genitourinary: Negative for decreased urine volume and difficulty urinating.   Musculoskeletal: Negative for arthralgias and myalgias.   Skin: Negative for rash.   Neurological: Negative for dizziness, weakness and headaches.   Psychiatric/Behavioral: Negative for dysphoric mood and sleep disturbance. The patient is not nervous/anxious.        Objective:   /68   Pulse 104    Temp 98.2 °F (36.8 °C) (Temporal)   Resp 16   Ht 6' (1.829 m)   Wt 123.9 kg (273 lb 0.6 oz)   SpO2 99%   BMI 37.03 kg/m²   Physical Exam   Constitutional: He is oriented to person, place, and time. He appears well-developed and well-nourished. No distress.   Obese, non-toxic   HENT:   Head: Normocephalic and atraumatic.   Right Ear: External ear normal.   Left Ear: External ear normal.   Nose: Nose normal.   Mouth/Throat: Oropharynx is clear and moist.   Eyes: Pupils are equal, round, and reactive to light. Conjunctivae and EOM are normal.   Neck: Normal range of motion. Neck supple.   Cardiovascular: Normal rate, regular rhythm and normal heart sounds.   Pulmonary/Chest: Effort normal and breath sounds normal.   Neurological: He is alert and oriented to person, place, and time.   Skin: Skin is warm and dry. He is not diaphoretic.   Psychiatric: He has a normal mood and affect. His behavior is normal.       Assessment:       1. Prediabetes    2. JULI (obstructive sleep apnea)    3. Obesity (BMI 35.0-39.9 without comorbidity)        Plan:      Prediabetes  Spent time today discussing dietary and lifestyle measures as part of management of prediabetes. Ample time today was allotted for questions and answers. Will continue with current Metformin dosing while awaiting lab update. He is encouraged to return to see the dietician with any additional concerns beyond those discussed today. Praised patient for positive changes made including weight loss. Will plan to reassess clinically in 3-6 months depending on lab findings.   -     Hemoglobin A1c; Future; Expected date: 08/16/2019  -     Basic metabolic panel; Future; Expected date: 08/16/2019    JULI (obstructive sleep apnea)  Continued use is recommended. Further follow-up as per Pulmonology.    Obesity (BMI 35.0-39.9 without comorbidity)  Weight loss efforts are encouraged through diet and lifestyle measures.    Total visit time of 25 minutes with greater than  half the visit dedicated to counseling and coordinating care.    Note- discussed colon cancer screening. He reportedly underwent C scope outside the [place] and does not have any records of the study. He will check with his insurer regarding FITKIT as he notes all labs for his insurance must be obtained through [facility].

## 2019-08-26 ENCOUNTER — TELEPHONE (OUTPATIENT)
Dept: FAMILY MEDICINE | Facility: CLINIC | Age: 54
End: 2019-08-26

## 2019-08-26 NOTE — TELEPHONE ENCOUNTER
Labs from [facility] received. A1c has increased since it was last measured. We will need to continue with Metformin and dietary changes as well as continued weight loss. I would like to see him back in 3 months for follow-up. Please schedule.

## 2019-10-25 DIAGNOSIS — Z12.11 COLON CANCER SCREENING: ICD-10-CM

## 2019-11-03 DIAGNOSIS — R73.03 PREDIABETES: ICD-10-CM

## 2019-11-04 RX ORDER — METFORMIN HYDROCHLORIDE 500 MG/1
TABLET, EXTENDED RELEASE ORAL
Qty: 90 TABLET | Refills: 0 | OUTPATIENT
Start: 2019-11-04

## 2019-12-10 ENCOUNTER — TELEPHONE (OUTPATIENT)
Dept: FAMILY MEDICINE | Facility: CLINIC | Age: 54
End: 2019-12-10

## 2019-12-10 NOTE — TELEPHONE ENCOUNTER
----- Message from Lashawn Mccormack MD sent at 12/10/2019  9:36 AM CST -----  Can you find out if this is from lab?   ----- Message -----  From: Katie Rush  Sent: 12/10/2019   9:30 AM CST  To: Lashawn Mccormack MD     Patient FOBT specimen is

## 2019-12-11 DIAGNOSIS — Z12.11 COLON CANCER SCREENING: Primary | ICD-10-CM

## 2019-12-26 ENCOUNTER — OFFICE VISIT (OUTPATIENT)
Dept: FAMILY MEDICINE | Facility: CLINIC | Age: 54
End: 2019-12-26
Payer: COMMERCIAL

## 2019-12-26 VITALS
WEIGHT: 275.44 LBS | HEART RATE: 80 BPM | DIASTOLIC BLOOD PRESSURE: 94 MMHG | OXYGEN SATURATION: 98 % | BODY MASS INDEX: 37.36 KG/M2 | TEMPERATURE: 98 F | SYSTOLIC BLOOD PRESSURE: 130 MMHG

## 2019-12-26 DIAGNOSIS — J06.9 UPPER RESPIRATORY TRACT INFECTION, UNSPECIFIED TYPE: Primary | ICD-10-CM

## 2019-12-26 PROCEDURE — 99999 PR PBB SHADOW E&M-EST. PATIENT-LVL III: ICD-10-PCS | Mod: PBBFAC,,, | Performed by: FAMILY MEDICINE

## 2019-12-26 PROCEDURE — 96372 THER/PROPH/DIAG INJ SC/IM: CPT | Mod: S$GLB,,, | Performed by: FAMILY MEDICINE

## 2019-12-26 PROCEDURE — 99213 OFFICE O/P EST LOW 20 MIN: CPT | Mod: 25,S$GLB,, | Performed by: FAMILY MEDICINE

## 2019-12-26 PROCEDURE — 99213 PR OFFICE/OUTPT VISIT, EST, LEVL III, 20-29 MIN: ICD-10-PCS | Mod: 25,S$GLB,, | Performed by: FAMILY MEDICINE

## 2019-12-26 PROCEDURE — 99999 PR PBB SHADOW E&M-EST. PATIENT-LVL III: CPT | Mod: PBBFAC,,, | Performed by: FAMILY MEDICINE

## 2019-12-26 PROCEDURE — 96372 PR INJECTION,THERAP/PROPH/DIAG2ST, IM OR SUBCUT: ICD-10-PCS | Mod: S$GLB,,, | Performed by: FAMILY MEDICINE

## 2019-12-26 RX ORDER — PROMETHAZINE HYDROCHLORIDE AND DEXTROMETHORPHAN HYDROBROMIDE 6.25; 15 MG/5ML; MG/5ML
5 SYRUP ORAL EVERY 8 HOURS PRN
Qty: 118 ML | Refills: 1 | Status: SHIPPED | OUTPATIENT
Start: 2019-12-26 | End: 2020-01-05

## 2019-12-26 RX ORDER — BETAMETHASONE SODIUM PHOSPHATE AND BETAMETHASONE ACETATE 3; 3 MG/ML; MG/ML
6 INJECTION, SUSPENSION INTRA-ARTICULAR; INTRALESIONAL; INTRAMUSCULAR; SOFT TISSUE
Status: COMPLETED | OUTPATIENT
Start: 2019-12-26 | End: 2019-12-26

## 2019-12-26 RX ADMIN — BETAMETHASONE SODIUM PHOSPHATE AND BETAMETHASONE ACETATE 6 MG: 3; 3 INJECTION, SUSPENSION INTRA-ARTICULAR; INTRALESIONAL; INTRAMUSCULAR; SOFT TISSUE at 03:12

## 2019-12-26 NOTE — PROGRESS NOTES
Chief Complaint:    Chief Complaint   Patient presents with    Cough    Nasal Congestion    Sore Throat       History of Present Illness:    Presents today with the 2-3 day history of congestion cough denies any sinus pain no yellow-green stuff no fever no trouble breathing wheezing.    ROS:  Review of Systems   Constitutional: Negative for appetite change, chills and fever.   HENT: Positive for congestion. Negative for ear discharge, ear pain, facial swelling, mouth sores, postnasal drip, rhinorrhea, sinus pressure, sneezing, sore throat, trouble swallowing and voice change.    Eyes: Negative for discharge, redness and itching.   Respiratory: Positive for cough. Negative for chest tightness, shortness of breath and wheezing.    Cardiovascular: Negative for chest pain.       Past Medical History:   Diagnosis Date    BPH (benign prostatic hyperplasia)        Social History:  Social History     Socioeconomic History    Marital status:      Spouse name: Not on file    Number of children: Not on file    Years of education: Not on file    Highest education level: Not on file   Occupational History    Not on file   Social Needs    Financial resource strain: Not on file    Food insecurity:     Worry: Not on file     Inability: Not on file    Transportation needs:     Medical: Not on file     Non-medical: Not on file   Tobacco Use    Smoking status: Never Smoker    Smokeless tobacco: Never Used   Substance and Sexual Activity    Alcohol use: Yes     Comment: very rare    Drug use: No    Sexual activity: Yes     Partners: Female   Lifestyle    Physical activity:     Days per week: Not on file     Minutes per session: Not on file    Stress: Not on file   Relationships    Social connections:     Talks on phone: Not on file     Gets together: Not on file     Attends Oriental orthodox service: Not on file     Active member of club or organization: Not on file     Attends meetings of clubs or organizations:  Not on file     Relationship status: Not on file   Other Topics Concern    Not on file   Social History Narrative    Not on file       Family History:   family history includes Diabetes in his father.    Health Maintenance   Topic Date Due    Fecal Occult Blood Test (FOBT)/[employer]  1966    TETANUS VACCINE  03/12/1984    Lipid Panel  10/19/2012       Physical Exam:    Vital Signs  Temp: 98.1 °F (36.7 °C)  Temp src: Tympanic  Pulse: 80  SpO2: 98 %  BP: (!) 130/94  BP Location: Left arm  Patient Position: Sitting  Pain Score: 0-No pain  Height and Weight  Weight: 125 kg (275 lb 7.4 oz)]    Body mass index is 37.36 kg/m².    Physical Exam   Constitutional: He appears well-developed and well-nourished.   HENT:   Head: Normocephalic and atraumatic.   Right Ear: Tympanic membrane is not bulging.   Left Ear: Tympanic membrane is not bulging.   Nose: No rhinorrhea. Right sinus exhibits no maxillary sinus tenderness and no frontal sinus tenderness. Left sinus exhibits no maxillary sinus tenderness and no frontal sinus tenderness.   Mouth/Throat: Mucous membranes are normal. No posterior oropharyngeal erythema or tonsillar abscesses.   Eyes: Pupils are equal, round, and reactive to light. Conjunctivae are normal.   Neck: Normal range of motion.   Cardiovascular: Normal rate and normal heart sounds.   Pulmonary/Chest: Effort normal and breath sounds normal. No stridor. No respiratory distress. He has no wheezes. He has no rales. He exhibits no tenderness.   Abdominal: Soft. There is no tenderness.   Lymphadenopathy:     He has no cervical adenopathy.         Assessment:      ICD-10-CM ICD-9-CM   1. Upper respiratory tract infection, unspecified type J06.9 465.9         Plan:         Patient most likely has upper respiratory infection which is caused by a virus, explained to patient that most viral infection Will resolve uneventfully, and virus they do not respond to antibiotics.  If however the symptoms persist beyond  10 days and or If they get worse or she develops sinus pain on one side of the head, and green discharge, fever or trouble breathing that would be an indication for the use of antibiotics and the patient need to contact us at that time should that happen.  At this time it's okay to treat the symptoms.  Patient understood and acknowledged.    Upper respiratory tract infection, unspecified type    Other orders  -     promethazine-dextromethorphan (PROMETHAZINE-DM) 6.25-15 mg/5 mL Syrp; Take 5 mLs by mouth every 8 (eight) hours as needed.  Dispense: 118 mL; Refill: 1  -     betamethasone acetate-betamethasone sodium phosphate injection 6 mg            No orders of the defined types were placed in this encounter.      Current Outpatient Medications   Medication Sig Dispense Refill    blood-glucose meter kit To check BG 1 time daily to use with insurance preferred meter. 1 each 0    DM/PE/acetaminophen/doxylamine (VICKS DAYQUIL-NYQUIL ORAL) Take by mouth.      metFORMIN (GLUCOPHAGE-XR) 500 MG 24 hr tablet TAKE 1 TABLET(500 MG) BY MOUTH DAILY WITH BREAKFAST 90 tablet 0    fluticasone (FLONASE) 50 mcg/actuation nasal spray 2 sprays (100 mcg total) by Each Nare route once daily. (Patient not taking: Reported on 12/26/2019) 16 g 11    promethazine-dextromethorphan (PROMETHAZINE-DM) 6.25-15 mg/5 mL Syrp Take 5 mLs by mouth every 8 (eight) hours as needed. 118 mL 1     No current facility-administered medications for this visit.        There are no discontinued medications.    No follow-ups on file.      Priscila Baez MD

## 2019-12-26 NOTE — PROGRESS NOTES
Celestone 6  mg given IM  left Ventrogulteal, patient tolerated well, recommended 15 minute wait to watch for adverse reactions.

## 2020-01-08 ENCOUNTER — TELEPHONE (OUTPATIENT)
Dept: FAMILY MEDICINE | Facility: CLINIC | Age: 55
End: 2020-01-08

## 2020-01-08 NOTE — TELEPHONE ENCOUNTER
----- Message from Mrea Jenkins sent at 1/8/2020  1:26 PM CST -----  Contact: pt  Please call pt @ [phone], pt will discuss with Dr abebeelf

## 2020-01-08 NOTE — TELEPHONE ENCOUNTER
Levy  Applied for a job at [employer]. He wants to know if he can put your name down for a reference. I told him I dont think you would be able to bc of hippa but I would pass the message on

## 2020-01-20 ENCOUNTER — PATIENT OUTREACH (OUTPATIENT)
Dept: ADMINISTRATIVE | Facility: HOSPITAL | Age: 55
End: 2020-01-20

## 2020-02-12 DIAGNOSIS — R73.03 PREDIABETES: ICD-10-CM

## 2020-02-12 RX ORDER — METFORMIN HYDROCHLORIDE 500 MG/1
TABLET, EXTENDED RELEASE ORAL
Qty: 90 TABLET | Refills: 0 | OUTPATIENT
Start: 2020-02-12

## 2020-02-12 RX ORDER — METFORMIN HYDROCHLORIDE 500 MG/1
TABLET, EXTENDED RELEASE ORAL
Qty: 90 TABLET | Refills: 0 | Status: CANCELLED | OUTPATIENT
Start: 2020-02-12

## 2020-02-13 ENCOUNTER — PATIENT MESSAGE (OUTPATIENT)
Dept: FAMILY MEDICINE | Facility: CLINIC | Age: 55
End: 2020-02-13

## 2020-02-14 ENCOUNTER — OFFICE VISIT (OUTPATIENT)
Dept: FAMILY MEDICINE | Facility: CLINIC | Age: 55
End: 2020-02-14
Payer: COMMERCIAL

## 2020-02-14 VITALS
OXYGEN SATURATION: 97 % | DIASTOLIC BLOOD PRESSURE: 80 MMHG | HEART RATE: 83 BPM | BODY MASS INDEX: 36.58 KG/M2 | SYSTOLIC BLOOD PRESSURE: 110 MMHG | WEIGHT: 270.06 LBS | HEIGHT: 72 IN | RESPIRATION RATE: 16 BRPM | TEMPERATURE: 97 F

## 2020-02-14 DIAGNOSIS — R73.03 PREDIABETES: Primary | ICD-10-CM

## 2020-02-14 DIAGNOSIS — Z12.11 COLON CANCER SCREENING: ICD-10-CM

## 2020-02-14 DIAGNOSIS — J31.0 CHRONIC RHINITIS: ICD-10-CM

## 2020-02-14 DIAGNOSIS — H53.8 BLURRED VISION: ICD-10-CM

## 2020-02-14 DIAGNOSIS — G47.33 OSA (OBSTRUCTIVE SLEEP APNEA): ICD-10-CM

## 2020-02-14 DIAGNOSIS — E66.9 OBESITY (BMI 35.0-39.9 WITHOUT COMORBIDITY): ICD-10-CM

## 2020-02-14 LAB
CHOLEST SERPL-MSCNC: 218 MG/DL (ref 0–200)
HBA1C MFR BLD: 5.9 % (ref 4.3–5.6)
HDLC SERPL-MCNC: 38 MG/DL (ref 35–70)
LDL CHOLESTEROL DIRECT: 144 MG/DL (ref 0–130)
TRIGL SERPL-MCNC: 127 MG/DL (ref 35–175)
VLDL CHOLESTEROL: 25 MG/DL (ref 0–35)

## 2020-02-14 PROCEDURE — 99214 OFFICE O/P EST MOD 30 MIN: CPT | Mod: S$GLB,,, | Performed by: FAMILY MEDICINE

## 2020-02-14 PROCEDURE — 99214 PR OFFICE/OUTPT VISIT, EST, LEVL IV, 30-39 MIN: ICD-10-PCS | Mod: S$GLB,,, | Performed by: FAMILY MEDICINE

## 2020-02-14 PROCEDURE — 99999 PR PBB SHADOW E&M-EST. PATIENT-LVL III: ICD-10-PCS | Mod: PBBFAC,,, | Performed by: FAMILY MEDICINE

## 2020-02-14 PROCEDURE — 99999 PR PBB SHADOW E&M-EST. PATIENT-LVL III: CPT | Mod: PBBFAC,,, | Performed by: FAMILY MEDICINE

## 2020-02-14 RX ORDER — METFORMIN HYDROCHLORIDE 500 MG/1
TABLET, EXTENDED RELEASE ORAL
Qty: 90 TABLET | Refills: 0 | Status: SHIPPED | OUTPATIENT
Start: 2020-02-14 | End: 2020-06-04

## 2020-02-14 RX ORDER — IPRATROPIUM BROMIDE 21 UG/1
2 SPRAY, METERED NASAL 2 TIMES DAILY
Qty: 30 ML | Refills: 2 | Status: SHIPPED | OUTPATIENT
Start: 2020-02-14 | End: 2022-03-03

## 2020-02-14 NOTE — PROGRESS NOTES
Subjective:       Patient ID: Levy Mckoy is a 54 y.o. male.    Chief Complaint: Follow-up    HPI   Follow-up  55yo male presents today for follow-up. He is due for lab update with regard prediabetes. He has been taking Metformin routinely. Last A1c in August 2019 was measured at 5.85. He denies any symptoms of hyper or hypoglycemia. Last blood glucose check was approximately a week ago- fasting of 103. There has been a 3 pound weight loss since his last visit six months ago. He has not been making dietary or lifestyle changes. He is in the process of getting a new job- possibly with [employer]. Admits this process has been stressful. Denies any urinary symptoms. In the AM on awakening her notes rhinorrhea and at times when he eats his nose runs. Compliance with CPAP use is reported. His sleep patterns have significantly improved. There have been no ER visits or hospitalizations since last visit.     Review of Systems   Constitutional: Negative for activity change, appetite change, fatigue and unexpected weight change.   HENT: Positive for rhinorrhea. Negative for congestion, ear pain, sinus pressure and sore throat.    Eyes: Negative for visual disturbance.   Respiratory: Negative for cough and shortness of breath.    Cardiovascular: Negative for chest pain and palpitations.   Gastrointestinal: Negative for abdominal pain, blood in stool, constipation and diarrhea.   Endocrine: Negative for polydipsia, polyphagia and polyuria.   Genitourinary: Negative for decreased urine volume and difficulty urinating.   Musculoskeletal: Negative for arthralgias and myalgias.   Skin: Negative for rash.   Neurological: Negative for dizziness, weakness and headaches.   Psychiatric/Behavioral: Negative for dysphoric mood and sleep disturbance. The patient is not nervous/anxious.        Objective:   /80   Pulse 83   Temp 96.9 °F (36.1 °C) (Temporal)   Resp 16   Ht 6' (1.829 m)   Wt 122.5 kg (270 lb 1 oz)   SpO2 97%    BMI 36.63 kg/m²   Physical Exam   Constitutional: He is oriented to person, place, and time. He appears well-developed and well-nourished. No distress.   Obese, non-toxic   HENT:   Head: Normocephalic and atraumatic.   Right Ear: Tympanic membrane, external ear and ear canal normal.   Left Ear: Tympanic membrane, external ear and ear canal normal.   Nose: Nose normal.   Mouth/Throat: Oropharynx is clear and moist.   Eyes: Pupils are equal, round, and reactive to light. Conjunctivae and EOM are normal.   Neck: Normal range of motion. Neck supple.   Cardiovascular: Normal rate, regular rhythm and normal heart sounds.   Pulmonary/Chest: Effort normal and breath sounds normal.   Abdominal: Soft. Bowel sounds are normal.   Musculoskeletal: He exhibits no edema.   Neurological: He is alert and oriented to person, place, and time.   Skin: Skin is warm and dry. He is not diaphoretic.   Psychiatric: He has a normal mood and affect. His behavior is normal.       Assessment:       1. Prediabetes    2. Chronic rhinitis    3. JULI (obstructive sleep apnea)    4. Blurred vision    5. Obesity (BMI 35.0-39.9 without comorbidity)    6. Colon cancer screening        Plan:      Prediabetes  Continue with Metformin and proceed with updated fasting labs. He has again requested orders to be obtained at [facility]- have asked that he report back once labs are complete so we can ensure timely receipt. Emphasized importance of compliance with treatment and dietary control measures. Target A1c and glucose goals have been reviewed. Anticipate reassessment in 3-6 months pending results.   -     metFORMIN (GLUCOPHAGE-XR) 500 MG XR 24hr tablet; TAKE 1 TABLET(500 MG) BY MOUTH DAILY WITH BREAKFAST  Dispense: 90 tablet; Refill: 0  -     Hemoglobin A1c; Future; Expected date: 02/14/2020  -     Comprehensive metabolic panel; Future; Expected date: 02/14/2020  -     Lipid panel; Future; Expected date: 02/14/2020  -     TSH; Future; Expected date:  02/14/2020    Chronic rhinitis  Trial of Atroven nasal spray given symptoms.   -     ipratropium (ATROVENT) 0.03 % nasal spray; 2 sprays by Nasal route 2 (two) times daily.  Dispense: 30 mL; Refill: 2    JULI (obstructive sleep apnea)  Compliance with CPAP use is advised. Will arrange for yearly follow-up with Pulmonology.    Blurred vision  Recommend updated eye exam. He has elected to defer scheduling until he is able to find out about the new job prospect.    Obesity (BMI 35.0-39.9 without comorbidity)  Weight loss efforts are encouraged through diet and lifestyle measures.     Colon cancer screening  -     Fecal Immunochemical Test (iFOBT); Future; Expected date: 02/14/2020      Total visit time of 25 minutes with greater than half the visit dedicated to counseling and coordinating care.

## 2020-02-19 ENCOUNTER — APPOINTMENT (OUTPATIENT)
Dept: LAB | Facility: HOSPITAL | Age: 55
End: 2020-02-19
Attending: FAMILY MEDICINE
Payer: COMMERCIAL

## 2020-02-20 ENCOUNTER — PATIENT OUTREACH (OUTPATIENT)
Dept: ADMINISTRATIVE | Facility: HOSPITAL | Age: 55
End: 2020-02-20

## 2020-02-21 ENCOUNTER — PATIENT OUTREACH (OUTPATIENT)
Dept: ADMINISTRATIVE | Facility: HOSPITAL | Age: 55
End: 2020-02-21

## 2020-02-21 NOTE — PROGRESS NOTES
Manually entered labs from Outside lab: [facility]: Lipid panel & HA1c entered.

## 2020-04-09 ENCOUNTER — TELEPHONE (OUTPATIENT)
Dept: PULMONOLOGY | Facility: CLINIC | Age: 55
End: 2020-04-09

## 2020-06-04 DIAGNOSIS — R73.03 PREDIABETES: ICD-10-CM

## 2020-06-04 RX ORDER — METFORMIN HYDROCHLORIDE 500 MG/1
TABLET, EXTENDED RELEASE ORAL
Qty: 90 TABLET | Refills: 0 | OUTPATIENT
Start: 2020-06-04

## 2020-06-04 NOTE — TELEPHONE ENCOUNTER
----- Message from Karie Vides sent at 6/4/2020  8:14 AM CDT -----  Contact: pt  Type:  RX Refill Request    Who Called: pt  Refill or New Rx:refill  RX Name and Strength:metFORMIN (GLUCOPHAGE-XR) 500 MG XR 24hr tablet  How is the patient currently taking it? (ex. 1XDay):1x  Is this a 30 day or 90 day RX:90  Preferred Pharmacy with phone number:  [id number] - [place], [place] - [address] [address]  [address] [address] [address]  Phone: [phone] Fax: [phone]  Local or Mail Order:local  Ordering Provider:wayne  Would the patient rather a call back or a response via [facility]? Call back   Best Call Back Number:[phone] (home)   Additional Information: please call patient when the request is complete

## 2020-11-02 DIAGNOSIS — R73.03 PREDIABETES: ICD-10-CM

## 2020-11-02 RX ORDER — METFORMIN HYDROCHLORIDE 500 MG/1
TABLET, EXTENDED RELEASE ORAL
Qty: 90 TABLET | Refills: 0 | OUTPATIENT
Start: 2020-11-02

## 2020-11-02 NOTE — TELEPHONE ENCOUNTER
----- Message from Paula Trivedi sent at 11/2/2020  2:34 PM CST -----  Type:  RX Refill Request    Who Called: pt  Refill or New Rx:refill  RX Name and Strength:metformin 500 mg  How is the patient currently taking it? (ex. 1XDay):1XDay  Is this a 30 day or 90 day RX:90  Preferred Pharmacy with phone number:.  [facility] [address] [place], [place] - [address] [address]  [address] [address] [address]  Phone: [phone] Fax: [phone]  Local or Mail Order:local  Ordering Provider:Dr Giraldo  Would the patient rather a call back or a response via [facility]? Call back  Best Call Back Number:[phone]  Additional Information: .    Thank you

## 2020-11-03 ENCOUNTER — TELEPHONE (OUTPATIENT)
Dept: FAMILY MEDICINE | Facility: CLINIC | Age: 55
End: 2020-11-03

## 2020-11-03 NOTE — TELEPHONE ENCOUNTER
Spoke with Dr. Giraldo she stated pt will need to find a new pcp.  She is unable to prescribe medication. Pt has been out since end of September. Pt was notified and verbalized understanding.

## 2020-11-03 NOTE — TELEPHONE ENCOUNTER
----- Message from Heidy Strauss sent at 11/3/2020  1:20 PM CST -----  Regarding: refill  Contact: Patient  .Type:  RX Refill Request    Who Called:  Patient  Refill or New Rx: refill  RX Name and Strength: metFORMIN (GLUCOPHAGE-XR) 500 MG XR 24hr tablet  How is the patient currently taking it? (ex. 1XDay):  Is this a 30 day or 90 day RX:  Preferred Pharmacy with phone number: .    [facility] - [place], [place] - [address] [address]  [address] [address] [address]  Phone: [phone] Fax: [phone]      Local or Mail Order: Local  Ordering Provider: Dr Enzo Sunshine  Would the patient rather a call back or a response via [facility]? Call  Best Call Back Number: [phone]    Additional Information:

## 2020-11-05 ENCOUNTER — OFFICE VISIT (OUTPATIENT)
Dept: FAMILY MEDICINE | Facility: CLINIC | Age: 55
End: 2020-11-05
Payer: COMMERCIAL

## 2020-11-05 VITALS
WEIGHT: 275.56 LBS | OXYGEN SATURATION: 97 % | DIASTOLIC BLOOD PRESSURE: 80 MMHG | TEMPERATURE: 98 F | HEIGHT: 72 IN | SYSTOLIC BLOOD PRESSURE: 138 MMHG | RESPIRATION RATE: 16 BRPM | BODY MASS INDEX: 37.32 KG/M2 | HEART RATE: 86 BPM

## 2020-11-05 DIAGNOSIS — E66.9 OBESITY (BMI 35.0-39.9 WITHOUT COMORBIDITY): ICD-10-CM

## 2020-11-05 DIAGNOSIS — B35.3 TINEA PEDIS OF BOTH FEET: ICD-10-CM

## 2020-11-05 DIAGNOSIS — G47.33 OSA (OBSTRUCTIVE SLEEP APNEA): ICD-10-CM

## 2020-11-05 DIAGNOSIS — R73.03 PREDIABETES: Primary | ICD-10-CM

## 2020-11-05 DIAGNOSIS — J31.0 CHRONIC RHINITIS: ICD-10-CM

## 2020-11-05 PROCEDURE — 99999 PR PBB SHADOW E&M-EST. PATIENT-LVL IV: ICD-10-PCS | Mod: PBBFAC,,, | Performed by: FAMILY MEDICINE

## 2020-11-05 PROCEDURE — 99214 PR OFFICE/OUTPT VISIT, EST, LEVL IV, 30-39 MIN: ICD-10-PCS | Mod: S$GLB,,, | Performed by: FAMILY MEDICINE

## 2020-11-05 PROCEDURE — 99214 OFFICE O/P EST MOD 30 MIN: CPT | Mod: S$GLB,,, | Performed by: FAMILY MEDICINE

## 2020-11-05 PROCEDURE — 99999 PR PBB SHADOW E&M-EST. PATIENT-LVL IV: CPT | Mod: PBBFAC,,, | Performed by: FAMILY MEDICINE

## 2020-11-05 RX ORDER — METFORMIN HYDROCHLORIDE 500 MG/1
TABLET, EXTENDED RELEASE ORAL
Qty: 90 TABLET | Refills: 0 | Status: SHIPPED | OUTPATIENT
Start: 2020-11-05 | End: 2021-03-15 | Stop reason: SDUPTHER

## 2020-11-05 RX ORDER — CICLOPIROX OLAMINE 7.7 MG/G
CREAM TOPICAL 2 TIMES DAILY
Qty: 30 G | Refills: 1 | Status: SHIPPED | OUTPATIENT
Start: 2020-11-05 | End: 2022-03-03

## 2020-11-09 ENCOUNTER — PATIENT MESSAGE (OUTPATIENT)
Dept: FAMILY MEDICINE | Facility: CLINIC | Age: 55
End: 2020-11-09

## 2020-11-09 ENCOUNTER — TELEPHONE (OUTPATIENT)
Dept: FAMILY MEDICINE | Facility: CLINIC | Age: 55
End: 2020-11-09

## 2020-11-09 NOTE — TELEPHONE ENCOUNTER
Please let patient know I received his labs. A1c is stable with current Metformin dosing (5.8). His cholesterol levels, however, are higher than desired. Is he interested in seeing a dietician to help make lifestyle changes? He needs a low fat diet and I would recommend weight loss as well. If levels do not improve he may need medication. Let me know if he is agreeable to the dietician ramila.

## 2020-12-02 ENCOUNTER — PATIENT MESSAGE (OUTPATIENT)
Dept: ADMINISTRATIVE | Facility: HOSPITAL | Age: 55
End: 2020-12-02

## 2021-03-15 ENCOUNTER — OFFICE VISIT (OUTPATIENT)
Dept: FAMILY MEDICINE | Facility: CLINIC | Age: 56
End: 2021-03-15
Attending: FAMILY MEDICINE
Payer: COMMERCIAL

## 2021-03-15 VITALS
SYSTOLIC BLOOD PRESSURE: 124 MMHG | BODY MASS INDEX: 38.52 KG/M2 | OXYGEN SATURATION: 95 % | HEART RATE: 95 BPM | HEIGHT: 72 IN | WEIGHT: 284.38 LBS | TEMPERATURE: 99 F | DIASTOLIC BLOOD PRESSURE: 80 MMHG

## 2021-03-15 DIAGNOSIS — Z12.11 COLON CANCER SCREENING: Primary | ICD-10-CM

## 2021-03-15 DIAGNOSIS — Z12.5 PROSTATE CANCER SCREENING: ICD-10-CM

## 2021-03-15 DIAGNOSIS — G47.33 OSA (OBSTRUCTIVE SLEEP APNEA): ICD-10-CM

## 2021-03-15 DIAGNOSIS — R73.03 PRE-DIABETES: ICD-10-CM

## 2021-03-15 DIAGNOSIS — E66.01 SEVERE OBESITY (BMI 35.0-39.9) WITH COMORBIDITY: ICD-10-CM

## 2021-03-15 DIAGNOSIS — E78.5 DYSLIPIDEMIA: ICD-10-CM

## 2021-03-15 PROCEDURE — 99999 PR PBB SHADOW E&M-EST. PATIENT-LVL IV: ICD-10-PCS | Mod: PBBFAC,,, | Performed by: FAMILY MEDICINE

## 2021-03-15 PROCEDURE — 99214 PR OFFICE/OUTPT VISIT, EST, LEVL IV, 30-39 MIN: ICD-10-PCS | Mod: S$GLB,,, | Performed by: FAMILY MEDICINE

## 2021-03-15 PROCEDURE — 99999 PR PBB SHADOW E&M-EST. PATIENT-LVL IV: CPT | Mod: PBBFAC,,, | Performed by: FAMILY MEDICINE

## 2021-03-15 PROCEDURE — 99214 OFFICE O/P EST MOD 30 MIN: CPT | Mod: S$GLB,,, | Performed by: FAMILY MEDICINE

## 2021-03-15 RX ORDER — METFORMIN HYDROCHLORIDE 500 MG/1
TABLET, EXTENDED RELEASE ORAL
Qty: 90 TABLET | Refills: 0 | Status: SHIPPED | OUTPATIENT
Start: 2021-03-15 | End: 2022-03-03 | Stop reason: SDUPTHER

## 2021-03-17 ENCOUNTER — APPOINTMENT (OUTPATIENT)
Dept: LAB | Facility: HOSPITAL | Age: 56
End: 2021-03-17
Attending: FAMILY MEDICINE
Payer: COMMERCIAL

## 2021-03-22 ENCOUNTER — IMMUNIZATION (OUTPATIENT)
Dept: PRIMARY CARE CLINIC | Facility: CLINIC | Age: 56
End: 2021-03-22

## 2021-03-22 DIAGNOSIS — Z23 NEED FOR VACCINATION: Primary | ICD-10-CM

## 2021-03-22 PROCEDURE — 91300 COVID-19, MRNA, LNP-S, PF, 30 MCG/0.3 ML DOSE VACCINE: CPT | Mod: S$GLB,,, | Performed by: FAMILY MEDICINE

## 2021-03-22 PROCEDURE — 91300 COVID-19, MRNA, LNP-S, PF, 30 MCG/0.3 ML DOSE VACCINE: ICD-10-PCS | Mod: S$GLB,,, | Performed by: FAMILY MEDICINE

## 2021-03-22 PROCEDURE — 0001A COVID-19, MRNA, LNP-S, PF, 30 MCG/0.3 ML DOSE VACCINE: ICD-10-PCS | Mod: CV19,S$GLB,, | Performed by: FAMILY MEDICINE

## 2021-03-22 PROCEDURE — 0001A COVID-19, MRNA, LNP-S, PF, 30 MCG/0.3 ML DOSE VACCINE: CPT | Mod: CV19,S$GLB,, | Performed by: FAMILY MEDICINE

## 2021-04-12 ENCOUNTER — TELEPHONE (OUTPATIENT)
Dept: FAMILY MEDICINE | Facility: CLINIC | Age: 56
End: 2021-04-12

## 2021-04-13 ENCOUNTER — IMMUNIZATION (OUTPATIENT)
Dept: PRIMARY CARE CLINIC | Facility: CLINIC | Age: 56
End: 2021-04-13

## 2021-04-13 ENCOUNTER — PATIENT MESSAGE (OUTPATIENT)
Dept: FAMILY MEDICINE | Facility: CLINIC | Age: 56
End: 2021-04-13

## 2021-04-13 DIAGNOSIS — Z23 NEED FOR VACCINATION: Primary | ICD-10-CM

## 2021-04-13 PROCEDURE — 0002A COVID-19, MRNA, LNP-S, PF, 30 MCG/0.3 ML DOSE VACCINE: ICD-10-PCS | Mod: CV19,S$GLB,, | Performed by: FAMILY MEDICINE

## 2021-04-13 PROCEDURE — 91300 COVID-19, MRNA, LNP-S, PF, 30 MCG/0.3 ML DOSE VACCINE: CPT | Mod: S$GLB,,, | Performed by: FAMILY MEDICINE

## 2021-04-13 PROCEDURE — 0002A COVID-19, MRNA, LNP-S, PF, 30 MCG/0.3 ML DOSE VACCINE: CPT | Mod: CV19,S$GLB,, | Performed by: FAMILY MEDICINE

## 2021-04-13 PROCEDURE — 91300 COVID-19, MRNA, LNP-S, PF, 30 MCG/0.3 ML DOSE VACCINE: ICD-10-PCS | Mod: S$GLB,,, | Performed by: FAMILY MEDICINE

## 2021-07-09 ENCOUNTER — TELEPHONE (OUTPATIENT)
Dept: FAMILY MEDICINE | Facility: CLINIC | Age: 56
End: 2021-07-09

## 2021-07-12 ENCOUNTER — PATIENT MESSAGE (OUTPATIENT)
Dept: FAMILY MEDICINE | Facility: CLINIC | Age: 56
End: 2021-07-12

## 2021-07-30 ENCOUNTER — TELEPHONE (OUTPATIENT)
Dept: FAMILY MEDICINE | Facility: CLINIC | Age: 56
End: 2021-07-30

## 2021-07-30 DIAGNOSIS — Z12.5 PROSTATE CANCER SCREENING: ICD-10-CM

## 2021-07-30 DIAGNOSIS — R68.82 LOW LIBIDO: Primary | ICD-10-CM

## 2021-08-02 ENCOUNTER — TELEPHONE (OUTPATIENT)
Dept: FAMILY MEDICINE | Facility: CLINIC | Age: 56
End: 2021-08-02

## 2021-08-02 DIAGNOSIS — E29.1 HYPOGONADISM IN MALE: Primary | ICD-10-CM

## 2021-08-03 ENCOUNTER — PATIENT MESSAGE (OUTPATIENT)
Dept: FAMILY MEDICINE | Facility: CLINIC | Age: 56
End: 2021-08-03

## 2021-08-04 ENCOUNTER — OFFICE VISIT (OUTPATIENT)
Dept: UROLOGY | Facility: CLINIC | Age: 56
End: 2021-08-04
Attending: FAMILY MEDICINE
Payer: COMMERCIAL

## 2021-08-04 ENCOUNTER — TELEPHONE (OUTPATIENT)
Dept: UROLOGY | Facility: CLINIC | Age: 56
End: 2021-08-04

## 2021-08-04 VITALS
HEART RATE: 101 BPM | HEIGHT: 72 IN | BODY MASS INDEX: 39.36 KG/M2 | DIASTOLIC BLOOD PRESSURE: 84 MMHG | TEMPERATURE: 98 F | SYSTOLIC BLOOD PRESSURE: 135 MMHG | WEIGHT: 290.56 LBS

## 2021-08-04 DIAGNOSIS — E29.1 HYPOGONADISM IN MALE: ICD-10-CM

## 2021-08-04 PROCEDURE — 99999 PR PBB SHADOW E&M-EST. PATIENT-LVL III: CPT | Mod: PBBFAC,,, | Performed by: UROLOGY

## 2021-08-04 PROCEDURE — 99999 PR PBB SHADOW E&M-EST. PATIENT-LVL III: ICD-10-PCS | Mod: PBBFAC,,, | Performed by: UROLOGY

## 2021-08-04 PROCEDURE — 99204 OFFICE O/P NEW MOD 45 MIN: CPT | Mod: S$GLB,,, | Performed by: UROLOGY

## 2021-08-04 PROCEDURE — 99204 PR OFFICE/OUTPT VISIT, NEW, LEVL IV, 45-59 MIN: ICD-10-PCS | Mod: S$GLB,,, | Performed by: UROLOGY

## 2021-08-04 RX ORDER — CLOMIPHENE CITRATE 50 MG/1
50 TABLET ORAL DAILY
Qty: 30 TABLET | Refills: 11 | Status: SHIPPED | OUTPATIENT
Start: 2021-08-04 | End: 2021-09-03

## 2021-08-05 ENCOUNTER — TELEPHONE (OUTPATIENT)
Dept: UROLOGY | Facility: CLINIC | Age: 56
End: 2021-08-05

## 2021-09-15 ENCOUNTER — LAB VISIT (OUTPATIENT)
Dept: LAB | Facility: HOSPITAL | Age: 56
End: 2021-09-15
Attending: UROLOGY
Payer: COMMERCIAL

## 2021-09-15 DIAGNOSIS — E29.1 HYPOGONADISM IN MALE: ICD-10-CM

## 2021-09-15 LAB
PROLACTIN SERPL IA-MCNC: 10.6 NG/ML (ref 3.5–19.4)
TESTOST SERPL-MCNC: 555 NG/DL (ref 304–1227)

## 2021-09-15 PROCEDURE — 84146 ASSAY OF PROLACTIN: CPT | Performed by: UROLOGY

## 2021-09-15 PROCEDURE — 36415 COLL VENOUS BLD VENIPUNCTURE: CPT | Mod: PO | Performed by: UROLOGY

## 2021-09-15 PROCEDURE — 84403 ASSAY OF TOTAL TESTOSTERONE: CPT | Performed by: UROLOGY

## 2021-09-20 ENCOUNTER — PATIENT OUTREACH (OUTPATIENT)
Dept: ADMINISTRATIVE | Facility: OTHER | Age: 56
End: 2021-09-20

## 2021-09-21 ENCOUNTER — CLINICAL SUPPORT (OUTPATIENT)
Dept: UROLOGY | Facility: CLINIC | Age: 56
End: 2021-09-21
Payer: COMMERCIAL

## 2021-09-21 ENCOUNTER — OFFICE VISIT (OUTPATIENT)
Dept: UROLOGY | Facility: CLINIC | Age: 56
End: 2021-09-21
Payer: COMMERCIAL

## 2021-09-21 ENCOUNTER — TELEPHONE (OUTPATIENT)
Dept: UROLOGY | Facility: CLINIC | Age: 56
End: 2021-09-21

## 2021-09-21 VITALS
SYSTOLIC BLOOD PRESSURE: 128 MMHG | WEIGHT: 282.19 LBS | DIASTOLIC BLOOD PRESSURE: 86 MMHG | BODY MASS INDEX: 38.27 KG/M2

## 2021-09-21 DIAGNOSIS — E29.1 HYPOGONADISM IN MALE: Primary | ICD-10-CM

## 2021-09-21 PROCEDURE — 99213 PR OFFICE/OUTPT VISIT, EST, LEVL III, 20-29 MIN: ICD-10-PCS | Mod: S$GLB,,, | Performed by: UROLOGY

## 2021-09-21 PROCEDURE — 99999 PR PBB SHADOW E&M-EST. PATIENT-LVL III: ICD-10-PCS | Mod: PBBFAC,,, | Performed by: UROLOGY

## 2021-09-21 PROCEDURE — 99999 PR PBB SHADOW E&M-EST. PATIENT-LVL III: CPT | Mod: PBBFAC,,, | Performed by: UROLOGY

## 2021-09-21 PROCEDURE — 99213 OFFICE O/P EST LOW 20 MIN: CPT | Mod: S$GLB,,, | Performed by: UROLOGY

## 2021-09-21 PROCEDURE — 99999 PR PBB SHADOW E&M-EST. PATIENT-LVL II: CPT | Mod: PBBFAC,,,

## 2021-09-21 PROCEDURE — 99999 PR PBB SHADOW E&M-EST. PATIENT-LVL II: ICD-10-PCS | Mod: PBBFAC,,,

## 2021-09-21 RX ORDER — CLOMIPHENE CITRATE 50 MG/1
50 TABLET ORAL DAILY
COMMUNITY
End: 2021-10-13

## 2021-09-21 RX ORDER — TESTOSTERONE CYPIONATE 200 MG/ML
200 INJECTION, SOLUTION INTRAMUSCULAR
Qty: 10 ML | Refills: 0 | Status: SHIPPED | OUTPATIENT
Start: 2021-09-21 | End: 2022-04-05

## 2021-09-23 ENCOUNTER — PATIENT OUTREACH (OUTPATIENT)
Dept: ADMINISTRATIVE | Facility: HOSPITAL | Age: 56
End: 2021-09-23

## 2021-09-24 ENCOUNTER — LAB VISIT (OUTPATIENT)
Dept: LAB | Facility: HOSPITAL | Age: 56
End: 2021-09-24
Attending: UROLOGY
Payer: COMMERCIAL

## 2021-09-24 DIAGNOSIS — E29.1 HYPOGONADISM IN MALE: ICD-10-CM

## 2021-09-24 LAB — TESTOST SERPL-MCNC: >1500 NG/DL (ref 304–1227)

## 2021-09-24 PROCEDURE — 84403 ASSAY OF TOTAL TESTOSTERONE: CPT | Performed by: UROLOGY

## 2021-09-24 PROCEDURE — 36415 COLL VENOUS BLD VENIPUNCTURE: CPT | Mod: PO | Performed by: UROLOGY

## 2021-10-12 ENCOUNTER — PATIENT MESSAGE (OUTPATIENT)
Dept: UROLOGY | Facility: CLINIC | Age: 56
End: 2021-10-12

## 2021-10-12 DIAGNOSIS — N52.9 ERECTILE DYSFUNCTION, UNSPECIFIED ERECTILE DYSFUNCTION TYPE: Primary | ICD-10-CM

## 2021-10-13 RX ORDER — SILDENAFIL 100 MG/1
100 TABLET, FILM COATED ORAL DAILY PRN
Qty: 30 TABLET | Refills: 2 | Status: SHIPPED | OUTPATIENT
Start: 2021-10-13 | End: 2022-10-26

## 2021-12-01 ENCOUNTER — PATIENT MESSAGE (OUTPATIENT)
Dept: FAMILY MEDICINE | Facility: CLINIC | Age: 56
End: 2021-12-01
Payer: COMMERCIAL

## 2021-12-07 ENCOUNTER — TELEPHONE (OUTPATIENT)
Dept: FAMILY MEDICINE | Facility: CLINIC | Age: 56
End: 2021-12-07
Payer: COMMERCIAL

## 2021-12-08 ENCOUNTER — PATIENT MESSAGE (OUTPATIENT)
Dept: FAMILY MEDICINE | Facility: CLINIC | Age: 56
End: 2021-12-08

## 2021-12-08 ENCOUNTER — TELEPHONE (OUTPATIENT)
Dept: FAMILY MEDICINE | Facility: CLINIC | Age: 56
End: 2021-12-08
Payer: COMMERCIAL

## 2022-01-02 ENCOUNTER — PATIENT MESSAGE (OUTPATIENT)
Dept: ADMINISTRATIVE | Facility: HOSPITAL | Age: 57
End: 2022-01-02
Payer: COMMERCIAL

## 2022-02-17 DIAGNOSIS — E78.5 DYSLIPIDEMIA: ICD-10-CM

## 2022-02-17 RX ORDER — METFORMIN HYDROCHLORIDE 500 MG/1
TABLET, EXTENDED RELEASE ORAL
Qty: 90 TABLET | Refills: 0 | OUTPATIENT
Start: 2022-02-17

## 2022-02-17 NOTE — TELEPHONE ENCOUNTER
Care Due:                  Date            Visit Type   Department     Provider  --------------------------------------------------------------------------------                                EP -                              PRIMARY      [facility] INTERNAL  Michelle KELLY  Last Visit: 03-      CARE ([facility])   MEDICINE       Kathia  Next Visit: None Scheduled  None         None Found                                                            Last  Test          Frequency    Reason                     Performed    Due Date  --------------------------------------------------------------------------------    Office Visit  12 months..  metFORMIN................  03-   03-    Cr..........  12 months..  metFORMIN................  Not Found    Overdue    HBA1C.......  6 months...  metFORMIN................  Not Found    Overdue    Powered by [employer]. Reference number: [id number].   2/17/2022 10:29:20 AM CST

## 2022-02-25 ENCOUNTER — OFFICE VISIT (OUTPATIENT)
Dept: INTERNAL MEDICINE | Facility: CLINIC | Age: 57
End: 2022-02-25
Payer: COMMERCIAL

## 2022-02-25 VITALS
HEART RATE: 94 BPM | RESPIRATION RATE: 18 BRPM | WEIGHT: 292.31 LBS | DIASTOLIC BLOOD PRESSURE: 100 MMHG | BODY MASS INDEX: 39.59 KG/M2 | OXYGEN SATURATION: 98 % | SYSTOLIC BLOOD PRESSURE: 158 MMHG | HEIGHT: 72 IN | TEMPERATURE: 99 F

## 2022-02-25 DIAGNOSIS — Z12.5 SCREENING FOR PROSTATE CANCER: ICD-10-CM

## 2022-02-25 DIAGNOSIS — R03.0 ELEVATED BLOOD PRESSURE READING IN OFFICE WITHOUT DIAGNOSIS OF HYPERTENSION: ICD-10-CM

## 2022-02-25 DIAGNOSIS — R73.03 PRE-DIABETES: Primary | ICD-10-CM

## 2022-02-25 DIAGNOSIS — E66.01 CLASS 3 SEVERE OBESITY DUE TO EXCESS CALORIES WITH SERIOUS COMORBIDITY AND BODY MASS INDEX (BMI) OF 40.0 TO 44.9 IN ADULT: ICD-10-CM

## 2022-02-25 DIAGNOSIS — Z00.00 PREVENTATIVE HEALTH CARE: ICD-10-CM

## 2022-02-25 PROCEDURE — 99214 OFFICE O/P EST MOD 30 MIN: CPT | Mod: S$GLB,,, | Performed by: PHYSICIAN ASSISTANT

## 2022-02-25 PROCEDURE — 99214 PR OFFICE/OUTPT VISIT, EST, LEVL IV, 30-39 MIN: ICD-10-PCS | Mod: S$GLB,,, | Performed by: PHYSICIAN ASSISTANT

## 2022-02-25 PROCEDURE — 99999 PR PBB SHADOW E&M-EST. PATIENT-LVL IV: ICD-10-PCS | Mod: PBBFAC,,, | Performed by: PHYSICIAN ASSISTANT

## 2022-02-25 PROCEDURE — 99999 PR PBB SHADOW E&M-EST. PATIENT-LVL IV: CPT | Mod: PBBFAC,,, | Performed by: PHYSICIAN ASSISTANT

## 2022-02-25 NOTE — PROGRESS NOTES
Subjective:      Patient ID: Levy Mckoy is a 56 y.o. male.    Chief Complaint: Follow-up    Patient is new to me, being seen today for medication refill.     Prediabetes- metformin 500mg once daily, takes twice weekly   Blood sugar 130s, not checking regularly     BP elevated, denies h/o HTN  BP typically 117/70s  Does not check BP at home   Well controlled at doctor's visits     Last visit with Dr. Bae in 2021.     Review of Systems   Constitutional: Negative for chills, diaphoresis and fever.   HENT: Negative for congestion, rhinorrhea and sore throat.    Eyes: Negative for visual disturbance.   Respiratory: Negative for cough, shortness of breath and wheezing.    Cardiovascular: Negative for chest pain and leg swelling.   Gastrointestinal: Negative for abdominal pain, constipation, diarrhea, nausea and vomiting.   Skin: Negative for rash.   Neurological: Negative for dizziness, light-headedness and headaches.       Objective:   BP (!) 158/100   Pulse 94   Temp 98.8 °F (37.1 °C) (Tympanic)   Resp 18   Ht 6' (1.829 m)   Wt 132.6 kg (292 lb 5.3 oz)   SpO2 98%   BMI 39.65 kg/m²   Physical Exam  Constitutional:       General: He is not in acute distress.     Appearance: Normal appearance. He is well-developed. He is not ill-appearing.   HENT:      Head: Normocephalic and atraumatic.   Cardiovascular:      Rate and Rhythm: Normal rate and regular rhythm.      Heart sounds: Normal heart sounds. No murmur heard.  Pulmonary:      Effort: Pulmonary effort is normal. No respiratory distress.      Breath sounds: Normal breath sounds. No decreased breath sounds.   Musculoskeletal:      Right lower le+ Edema present.      Left lower le+ Edema present.   Skin:     General: Skin is warm and dry.      Findings: No rash.   Psychiatric:         Speech: Speech normal.         Behavior: Behavior normal.         Thought Content: Thought content normal.       Assessment:      1. Pre-diabetes     2. Preventative health care    3. Class 3 severe obesity due to excess calories with serious comorbidity and body mass index (BMI) of 40.0 to 44.9 in adult    4. Screening for prostate cancer    5. Elevated blood pressure reading in office without diagnosis of hypertension       Plan:   Pre-diabetes  -     Comprehensive Metabolic Panel; Future; Expected date: 02/25/2022  -     Hemoglobin A1C; Future; Expected date: 02/25/2022    Preventative health care  -     CBC Auto Differential; Future; Expected date: 02/25/2022  -     Comprehensive Metabolic Panel; Future; Expected date: 02/25/2022  -     Hemoglobin A1C; Future; Expected date: 02/25/2022  -     Lipid Panel; Future; Expected date: 02/25/2022  -     TSH; Future; Expected date: 02/25/2022    Class 3 severe obesity due to excess calories with serious comorbidity and body mass index (BMI) of 40.0 to 44.9 in adult    Screening for prostate cancer  -     PSA, Screening; Future; Expected date: 02/25/2022    Elevated blood pressure reading in office without diagnosis of hypertension    Lab prior to refill to determine most appropriate dosing     Difficult to get labs when fasting d/t work   Ate around noon- pastalaya   Will complete today     Obtain BP cuff and monitor at home   BP elevated today but is typically well controlled in office, denies symptoms   High salt lunch   Limit salt, ensure adequate hydration   1wk f/u, ER precautions given     Discussed worsening signs/symptoms and when to return to clinic or go to ED.   Patient expresses understanding and agrees with treatment plan.

## 2022-02-28 ENCOUNTER — TELEPHONE (OUTPATIENT)
Dept: FAMILY MEDICINE | Facility: CLINIC | Age: 57
End: 2022-02-28
Payer: COMMERCIAL

## 2022-02-28 NOTE — TELEPHONE ENCOUNTER
----- Message from Qing Hsu sent at 2/28/2022 12:41 PM CST -----  Contact: Levy  Patient is calling to speak with the nurse regarding the lab orders for 3/3/22. Explains NP placed orders for lab however patients insurance requires patient to have labs drawn at [facility]. Patient is needing orders sent to [facility] if Dr Bertrand is needing labs for upcoming visit. Please give patient a call back today at [phone] as requested.   Thanks,  RP

## 2022-02-28 NOTE — TELEPHONE ENCOUNTER
Pt is wanting to know if he needs labs other than what PA ordered? Please advise? He has an appointment on 3/3 with you.

## 2022-03-03 ENCOUNTER — OFFICE VISIT (OUTPATIENT)
Dept: FAMILY MEDICINE | Facility: CLINIC | Age: 57
End: 2022-03-03
Attending: FAMILY MEDICINE
Payer: COMMERCIAL

## 2022-03-03 VITALS
HEIGHT: 72 IN | BODY MASS INDEX: 39.61 KG/M2 | WEIGHT: 292.44 LBS | DIASTOLIC BLOOD PRESSURE: 86 MMHG | OXYGEN SATURATION: 97 % | HEART RATE: 86 BPM | TEMPERATURE: 99 F | SYSTOLIC BLOOD PRESSURE: 134 MMHG

## 2022-03-03 DIAGNOSIS — G47.33 OSA (OBSTRUCTIVE SLEEP APNEA): ICD-10-CM

## 2022-03-03 DIAGNOSIS — Z12.11 COLON CANCER SCREENING: Primary | ICD-10-CM

## 2022-03-03 DIAGNOSIS — Z12.5 PROSTATE CANCER SCREENING: ICD-10-CM

## 2022-03-03 DIAGNOSIS — R73.03 PRE-DIABETES: ICD-10-CM

## 2022-03-03 DIAGNOSIS — E78.5 DYSLIPIDEMIA: ICD-10-CM

## 2022-03-03 DIAGNOSIS — I10 HYPERTENSION, UNSPECIFIED TYPE: ICD-10-CM

## 2022-03-03 DIAGNOSIS — E66.01 SEVERE OBESITY (BMI 35.0-39.9) WITH COMORBIDITY: ICD-10-CM

## 2022-03-03 PROCEDURE — 99214 PR OFFICE/OUTPT VISIT, EST, LEVL IV, 30-39 MIN: ICD-10-PCS | Mod: S$GLB,,, | Performed by: FAMILY MEDICINE

## 2022-03-03 PROCEDURE — 99999 PR PBB SHADOW E&M-EST. PATIENT-LVL III: ICD-10-PCS | Mod: PBBFAC,,, | Performed by: FAMILY MEDICINE

## 2022-03-03 PROCEDURE — 99214 OFFICE O/P EST MOD 30 MIN: CPT | Mod: S$GLB,,, | Performed by: FAMILY MEDICINE

## 2022-03-03 PROCEDURE — 99999 PR PBB SHADOW E&M-EST. PATIENT-LVL III: CPT | Mod: PBBFAC,,, | Performed by: FAMILY MEDICINE

## 2022-03-03 RX ORDER — METFORMIN HYDROCHLORIDE 500 MG/1
TABLET, EXTENDED RELEASE ORAL
Qty: 90 TABLET | Refills: 0 | Status: SHIPPED | OUTPATIENT
Start: 2022-03-03 | End: 2022-08-22 | Stop reason: SDUPTHER

## 2022-03-03 NOTE — PROGRESS NOTES
"Subjective:       Patient ID: Levy Mckoy is a 56 y.o. male.    Chief Complaint: Follow-up    56 y old male here for follow up . He has started the " beach body" diet . Compliant with CPAP. Will like to stop metformin . No er visits nor hospitalizations since last seen. BP was noted to be elevated on last o/v 1 w ago . He has not purchased BP cuff. On Testosterone replacement therapy   For 7 months     Review of Systems   Constitutional: Negative.    HENT: Negative.    Eyes: Negative.    Respiratory: Negative.    Cardiovascular: Negative.    Gastrointestinal: Negative.    Genitourinary: Negative.    Musculoskeletal: Negative.    Skin: Negative.    Hematological: Negative.        Objective:      Physical Exam  Constitutional:       General: He is not in acute distress.     Appearance: He is well-developed. He is not diaphoretic.   HENT:      Head: Normocephalic and atraumatic.      Right Ear: External ear normal.      Left Ear: External ear normal.      Nose: Nose normal.      Mouth/Throat:      Pharynx: No oropharyngeal exudate.   Eyes:      General: No scleral icterus.        Right eye: No discharge.         Left eye: No discharge.      Conjunctiva/sclera: Conjunctivae normal.      Pupils: Pupils are equal, round, and reactive to light.   Neck:      Thyroid: No thyromegaly.      Vascular: No JVD.      Trachea: No tracheal deviation.   Cardiovascular:      Rate and Rhythm: Normal rate and regular rhythm.      Heart sounds: Normal heart sounds. No murmur heard.    No friction rub. No gallop.   Pulmonary:      Effort: Pulmonary effort is normal. No respiratory distress.      Breath sounds: Normal breath sounds. No stridor. No wheezing or rales.   Chest:      Chest wall: No tenderness.   Abdominal:      General: Bowel sounds are normal. There is no distension.      Palpations: Abdomen is soft.      Tenderness: There is no abdominal tenderness. There is no guarding or rebound.   Musculoskeletal:         General: " No tenderness. Normal range of motion.      Cervical back: Normal range of motion and neck supple.   Lymphadenopathy:      Cervical: No cervical adenopathy.   Skin:     General: Skin is warm and dry.      Coloration: Skin is not pale.      Findings: No erythema or rash.   Neurological:      Mental Status: He is alert and oriented to person, place, and time.      Cranial Nerves: No cranial nerve deficit.      Motor: No abnormal muscle tone.      Coordination: Coordination normal.      Deep Tendon Reflexes: Reflexes are normal and symmetric. Reflexes normal.   Psychiatric:         Behavior: Behavior normal.         Thought Content: Thought content normal.         Judgment: Judgment normal.         Assessment:       Levy was seen today for follow-up.    Diagnoses and all orders for this visit:    Colon cancer screening  -     Fecal Immunochemical Test (iFOBT); Future    Prostate cancer screening  -     PSA, Screening; Future    Pre-diabetes  -     CBC Auto Differential; Future  -     Comprehensive Metabolic Panel; Future  -     Hemoglobin A1C; Future  -     Lipid Panel; Future    Dyslipidemia  -     metFORMIN (GLUCOPHAGE-XR) 500 MG ER 24hr tablet; Take 1 tablet by mouth once daily with breakfast    JULI (obstructive sleep apnea)    Severe obesity (BMI 35.0-39.9) with comorbidity    Hypertension, unspecified type      Plan:     Levy was seen today for follow-up.    Diagnoses and all orders for this visit:    Colon cancer screening  -     Fecal Immunochemical Test (iFOBT); Future    Prostate cancer screening  -     PSA, Screening; Future    Pre-diabetes  -     CBC Auto Differential; Future  -     Comprehensive Metabolic Panel; Future  -     Hemoglobin A1C; Future  -     Lipid Panel; Future     Diet and exercise   CPAP  Monitor BP at home

## 2022-03-17 ENCOUNTER — PATIENT MESSAGE (OUTPATIENT)
Dept: FAMILY MEDICINE | Facility: CLINIC | Age: 57
End: 2022-03-17
Payer: COMMERCIAL

## 2022-04-05 ENCOUNTER — PATIENT MESSAGE (OUTPATIENT)
Dept: UROLOGY | Facility: CLINIC | Age: 57
End: 2022-04-05
Payer: COMMERCIAL

## 2022-04-18 ENCOUNTER — PATIENT OUTREACH (OUTPATIENT)
Dept: ADMINISTRATIVE | Facility: OTHER | Age: 57
End: 2022-04-18
Payer: COMMERCIAL

## 2022-04-19 ENCOUNTER — PATIENT MESSAGE (OUTPATIENT)
Dept: FAMILY MEDICINE | Facility: CLINIC | Age: 57
End: 2022-04-19
Payer: COMMERCIAL

## 2022-04-19 ENCOUNTER — OFFICE VISIT (OUTPATIENT)
Dept: UROLOGY | Facility: CLINIC | Age: 57
End: 2022-04-19
Payer: COMMERCIAL

## 2022-04-19 ENCOUNTER — PATIENT MESSAGE (OUTPATIENT)
Dept: UROLOGY | Facility: CLINIC | Age: 57
End: 2022-04-19

## 2022-04-19 VITALS
SYSTOLIC BLOOD PRESSURE: 159 MMHG | HEART RATE: 89 BPM | DIASTOLIC BLOOD PRESSURE: 90 MMHG | BODY MASS INDEX: 39.65 KG/M2 | TEMPERATURE: 98 F | WEIGHT: 292.31 LBS

## 2022-04-19 DIAGNOSIS — N52.9 ERECTILE DYSFUNCTION, UNSPECIFIED ERECTILE DYSFUNCTION TYPE: ICD-10-CM

## 2022-04-19 DIAGNOSIS — E29.1 HYPOGONADISM IN MALE: ICD-10-CM

## 2022-04-19 DIAGNOSIS — N52.9 ERECTILE DYSFUNCTION, UNSPECIFIED ERECTILE DYSFUNCTION TYPE: Primary | ICD-10-CM

## 2022-04-19 PROCEDURE — 99214 PR OFFICE/OUTPT VISIT, EST, LEVL IV, 30-39 MIN: ICD-10-PCS | Mod: S$GLB,,, | Performed by: UROLOGY

## 2022-04-19 PROCEDURE — 99999 PR PBB SHADOW E&M-EST. PATIENT-LVL III: CPT | Mod: PBBFAC,,, | Performed by: UROLOGY

## 2022-04-19 PROCEDURE — 99999 PR PBB SHADOW E&M-EST. PATIENT-LVL III: ICD-10-PCS | Mod: PBBFAC,,, | Performed by: UROLOGY

## 2022-04-19 PROCEDURE — 99214 OFFICE O/P EST MOD 30 MIN: CPT | Mod: S$GLB,,, | Performed by: UROLOGY

## 2022-04-19 RX ORDER — TADALAFIL 20 MG/1
20 TABLET ORAL DAILY
Qty: 30 TABLET | Refills: 11 | Status: SHIPPED | OUTPATIENT
Start: 2022-04-19 | End: 2022-04-21 | Stop reason: SDUPTHER

## 2022-04-19 NOTE — PROGRESS NOTES
Chief Complaint:  Low testosterone    HPI:   04/19/2022 - returns today for follow-up, testosterone going well, viagra working but notes issues with timing restrictions, wants to try cialis, due for labs    09/21/2021 - patient returns today for follow-up, he has been taking the Clomid as prescribed for about a month and his testosterone level increased to 555, he notes some improvement in his symptoms but not significant, is interested in proceeding with testosterone injections    08/04/2021 - 57 y.o. male that presents for low testosterone. Patient notes about a 1 year history of a fatigue, weight gain, low energy, and ED.  he was talking with 1 of his friends who noted he should get his testosterone checked.  Per patient's report, testosterone level was around 100, his outside lab work from a [facility] does not have this listed.  PSA 2.3, serum creatinine normal.  Patient denies any voiding difficulty, denies gross hematuria, denies a prior urologic procedures, denies family history of  cancers.    PMH:  Past Medical History:   Diagnosis Date    BPH (benign prostatic hyperplasia)        PSH:  Past Surgical History:   Procedure Laterality Date    APPENDECTOMY      CYST REMOVAL      right ankle         Family History:  Family History   Problem Relation Age of Onset    Diabetes Father     Coronary artery disease Father        Social History:  Social History     Tobacco Use    Smoking status: Never Smoker    Smokeless tobacco: Never Used   Substance Use Topics    Alcohol use: Yes     Comment: very rare    Drug use: No        Review of Systems:  General: No fever, chills  Skin: No rashes  Chest:  Denies cough and sputum production  Heart: Denies chest pain  Resp: Denies dyspnea  Abdomen: Denies diarrhea, abdominal pain, hematemesis, or blood in stool.  Musculoskeletal: No joint stiffness or swelling. Denies back pain.  : see HPI  Neuro: no dizziness or weakness    Allergies:  Patient has no  known allergies.    Medications:    Current Outpatient Medications:     blood-glucose meter kit, To check BG 1 time daily to use with insurance preferred meter., Disp: 1 each, Rfl: 0    metFORMIN (GLUCOPHAGE-XR) 500 MG ER 24hr tablet, Take 1 tablet by mouth once daily with breakfast, Disp: 90 tablet, Rfl: 0    sildenafiL (VIAGRA) 100 MG tablet, Take 1 tablet (100 mg total) by mouth daily as needed for Erectile Dysfunction., Disp: 30 tablet, Rfl: 2    testosterone cypionate (DEPOTESTOTERONE CYPIONATE) 200 mg/mL injection, INJECT 1ML INTO THE MUSCLE EVERY 21 DAYS, Disp: 5 mL, Rfl: 1    Physical Exam:  Vitals:    04/19/22 1419   BP: (!) 159/90   Pulse: 89   Temp: 98.2 °F (36.8 °C)     Body mass index is 39.65 kg/m².  General: awake, alert, cooperative  Head: NC/AT  Ears: external ears normal  Eyes: sclera normal  Lungs: normal inspiration, NAD  Heart: well-perfused  Abdomen: Soft, NT, ND   08/21: Normal circ'd phallus, meatus normal in size and position, BL testicles palpable, no masses, nontender, no abnormalities of epididymi  IBAN 08/21: Normal rectal tone, no hemorrhoids. Prostate smooth and normal, no nodules 30 gm SV not palpable. Perineum and anus normal.  Skin: The skin is warm and dry  Ext: No c/c/e.  Neuro: grossly intact, AOx3    RADIOLOGY:  No recent relevant imaging available for review.    LABS:  I personally reviewed the following lab values:  Lab Results   Component Value Date    WBC 7.97 10/19/2011    HGB 14.5 10/19/2011    HCT 45.9 10/19/2011     (H) 10/19/2011     10/19/2011    K 4.0 10/19/2011     10/19/2011    CREATININE 1.3 10/19/2011    BUN 19 10/19/2011    CO2 23 10/19/2011    TSH 1.42 06/01/2011    HGBA1C 5.9 (H) 02/14/2020    CHOL 218 (H) 02/14/2020    TRIG 127 02/14/2020    HDL 38 02/14/2020    LDLDIRECT 144 (H) 02/14/2020    ALT 32 10/19/2011     Assessment/Plan:   Levy Mckoy is a 57 y.o. male with:    Low testosterone - continue T, labs today (external due to  cost, will have them fax to us)    ED - switch to cialis    Prostate Cancer Screening - PSA and IBAN normal, continue annual screening     - F/u 1 yr    Thank you for allowing me the opportunity to participate in this patient's care.     Adis Armando MD  Urology

## 2022-04-20 ENCOUNTER — TELEPHONE (OUTPATIENT)
Dept: UROLOGY | Facility: CLINIC | Age: 57
End: 2022-04-20
Payer: COMMERCIAL

## 2022-04-20 NOTE — TELEPHONE ENCOUNTER
Called [facility] to verify lab order.    ----- Message from Miley Yang sent at 4/19/2022  3:43 PM CDT -----  Contact: outpatient clinic lab [facility]/[phone]  [facility] nurse called in regards needing to verify order. Please call and advise. Thank you

## 2022-04-21 DIAGNOSIS — E29.1 HYPOGONADISM IN MALE: ICD-10-CM

## 2022-04-21 RX ORDER — TESTOSTERONE CYPIONATE 200 MG/ML
INJECTION, SOLUTION INTRAMUSCULAR
Qty: 5 ML | Refills: 1 | Status: SHIPPED | OUTPATIENT
Start: 2022-04-21 | End: 2022-10-13 | Stop reason: SDUPTHER

## 2022-04-22 RX ORDER — TADALAFIL 20 MG/1
20 TABLET ORAL DAILY
Qty: 30 TABLET | Refills: 11 | Status: SHIPPED | OUTPATIENT
Start: 2022-04-22 | End: 2023-07-03 | Stop reason: SDUPTHER

## 2022-07-06 ENCOUNTER — TELEPHONE (OUTPATIENT)
Dept: DERMATOLOGY | Facility: CLINIC | Age: 57
End: 2022-07-06
Payer: COMMERCIAL

## 2022-07-06 NOTE — TELEPHONE ENCOUNTER
Spoke with patient and schedule patient with podiatry.    Dorcas..    ----- Message from Sharona Song sent at 7/5/2022 11:12 AM CDT -----  Contact: Levy  Type:  Sooner Apoointment Request    Caller is requesting a sooner appointment.  Caller declined first available appointment listed below.  Caller will not accept being placed on the waitlist and is requesting a message be sent to doctor.  Name of Caller:Levy  When is the first available appointment?unknown  Symptoms:toe nail fungus  Would the patient rather a call back or a response via [facility]? call  Best Call Back Number:[phone]  Additional Information: Patient request to schedule for an appointment sooner than next available. Please give patient a call back when possible.  Thank you,  GH

## 2022-07-12 ENCOUNTER — TELEPHONE (OUTPATIENT)
Dept: FAMILY MEDICINE | Facility: CLINIC | Age: 57
End: 2022-07-12
Payer: COMMERCIAL

## 2022-07-12 ENCOUNTER — OFFICE VISIT (OUTPATIENT)
Dept: URGENT CARE | Facility: CLINIC | Age: 57
End: 2022-07-12
Payer: COMMERCIAL

## 2022-07-12 VITALS
HEART RATE: 98 BPM | RESPIRATION RATE: 18 BRPM | OXYGEN SATURATION: 95 % | BODY MASS INDEX: 39.81 KG/M2 | SYSTOLIC BLOOD PRESSURE: 113 MMHG | WEIGHT: 293.94 LBS | TEMPERATURE: 99 F | HEIGHT: 72 IN | DIASTOLIC BLOOD PRESSURE: 59 MMHG

## 2022-07-12 DIAGNOSIS — R31.9 URINARY TRACT INFECTION WITH HEMATURIA, SITE UNSPECIFIED: Primary | ICD-10-CM

## 2022-07-12 DIAGNOSIS — N39.0 URINARY TRACT INFECTION WITH HEMATURIA, SITE UNSPECIFIED: Primary | ICD-10-CM

## 2022-07-12 PROBLEM — H61.21 IMPACTED CERUMEN, RIGHT EAR: Status: ACTIVE | Noted: 2021-12-08

## 2022-07-12 LAB
BILIRUB UR QL STRIP: NEGATIVE
GLUCOSE UR QL STRIP: NEGATIVE
KETONES UR QL STRIP: NEGATIVE
LEUKOCYTE ESTERASE UR QL STRIP: POSITIVE
PH, POC UA: 5.5
POC BLOOD, URINE: POSITIVE
POC NITRATES, URINE: POSITIVE
PROT UR QL STRIP: POSITIVE
SP GR UR STRIP: 1.02 (ref 1–1.03)
UROBILINOGEN UR STRIP-ACNC: NORMAL (ref 0.3–2.2)

## 2022-07-12 PROCEDURE — 99203 PR OFFICE/OUTPT VISIT, NEW, LEVL III, 30-44 MIN: ICD-10-PCS | Mod: S$GLB,,, | Performed by: PHYSICIAN ASSISTANT

## 2022-07-12 PROCEDURE — 87088 URINE BACTERIA CULTURE: CPT | Performed by: PHYSICIAN ASSISTANT

## 2022-07-12 PROCEDURE — 81003 URINALYSIS AUTO W/O SCOPE: CPT | Mod: QW,S$GLB,, | Performed by: PHYSICIAN ASSISTANT

## 2022-07-12 PROCEDURE — 87086 URINE CULTURE/COLONY COUNT: CPT | Performed by: PHYSICIAN ASSISTANT

## 2022-07-12 PROCEDURE — 81003 POCT URINALYSIS, DIPSTICK, AUTOMATED, W/O SCOPE: ICD-10-PCS | Mod: QW,S$GLB,, | Performed by: PHYSICIAN ASSISTANT

## 2022-07-12 PROCEDURE — 87186 SC STD MICRODIL/AGAR DIL: CPT | Performed by: PHYSICIAN ASSISTANT

## 2022-07-12 PROCEDURE — 99203 OFFICE O/P NEW LOW 30 MIN: CPT | Mod: S$GLB,,, | Performed by: PHYSICIAN ASSISTANT

## 2022-07-12 PROCEDURE — 87077 CULTURE AEROBIC IDENTIFY: CPT | Performed by: PHYSICIAN ASSISTANT

## 2022-07-12 RX ORDER — CIPROFLOXACIN 500 MG/1
500 TABLET ORAL 2 TIMES DAILY
Qty: 10 TABLET | Refills: 0 | Status: SHIPPED | OUTPATIENT
Start: 2022-07-12 | End: 2022-07-17

## 2022-07-12 NOTE — TELEPHONE ENCOUNTER
----- Message from Jerry Castro sent at 7/12/2022  1:05 PM CDT -----  Contact: Levy  Type:  Same Day Appointment Request    Caller is requesting a same day appointment.  Caller declined first available appointment listed below.    Name of Caller:Levy  When is the first available appointment? Next week  Symptoms:frequent urination ,body ache  Best Call Back Number: [phone]   Additional Information:

## 2022-07-12 NOTE — PROGRESS NOTES
Subjective:       Patient ID: Levy Mckoy is a 57 y.o. male.    Vitals:  height is 6' (1.829 m) and weight is 133.3 kg (293 lb 15 oz). His tympanic temperature is 98.5 °F (36.9 °C). His blood pressure is 113/59 (abnormal) and his pulse is 98. His respiration is 18 and oxygen saturation is 95%.     Chief Complaint: Dysuria    Patient is a 57-year-old male who presents with a three day history of UTI like symptoms.  Patient states he started on Saturday with low back pain that is a dull aching pain.  Patient states that he started having some abdominal pressure in fever yesterday.  Patient states he has frequent urination throughout the night and now has associated dysuria.  Patient denies any hematuria.  Patient has not had any penile discharge.  Patient does not feel like he is sitting around.  Patient denies any nausea, vomiting, diarrhea, flank pain.  Patient has not had any URI like symptoms.    Dysuria   The problem has been gradually worsening. The quality of the pain is described as burning, stabbing, shooting and aching. The pain is at a severity of 9/10. The maximum temperature recorded prior to his arrival was 101 - 101.9 F. He is sexually active. There is no history of pyelonephritis. Associated symptoms include chills, frequency and sweats. Pertinent negatives include no behavior changes, discharge, flank pain, hematuria, hesitancy, nausea, possible pregnancy, urgency, vomiting, weight loss, bubble bath use, constipation, rash or withholding. He has tried NSAIDs for the symptoms. The treatment provided no relief.       Constitution: Positive for chills. Negative for fever.   Cardiovascular: Negative for chest pain.   Respiratory: Negative for shortness of breath.    Gastrointestinal: Positive for abdominal pain. Negative for nausea, vomiting and constipation.   Genitourinary: Positive for dysuria and frequency. Negative for urgency, flank pain, hematuria, penile discharge, penile pain, penile  swelling, scrotal swelling, testicular pain and pelvic pain.   Musculoskeletal: Positive for back pain.   Skin: Negative for rash.       Objective:      Physical Exam   Constitutional: He is oriented to person, place, and time. He appears well-developed. No distress.   HENT:   Head: Normocephalic and atraumatic.   Ears:   Right Ear: External ear normal.   Left Ear: External ear normal.   Nose: Nose normal. No nasal deformity. No epistaxis.   Mouth/Throat: Oropharynx is clear and moist and mucous membranes are normal.   Eyes: Conjunctivae and lids are normal.   Neck: Trachea normal and phonation normal. Neck supple.   Cardiovascular: Normal rate, regular rhythm and normal heart sounds.   No murmur heard.  Pulmonary/Chest: Effort normal and breath sounds normal. No respiratory distress.   Abdominal: Normal appearance. He exhibits no distension. Soft. There is no abdominal tenderness. There is no left CVA tenderness and no right CVA tenderness.   Musculoskeletal: Normal range of motion.         General: Normal range of motion.   Neurological: He is alert and oriented to person, place, and time. He has normal reflexes.   Skin: Skin is warm, dry, intact and not diaphoretic.   Psychiatric: His speech is normal and behavior is normal. Judgment and thought content normal.   Nursing note and vitals reviewed.    Results for orders placed or performed in visit on 07/12/22   POCT Urinalysis, Dipstick, Automated, W/O Scope   Result Value Ref Range    POC Blood, Urine Positive (A) Negative    POC Bilirubin, Urine Negative Negative    POC Urobilinogen, Urine normal 0.3 - 2.2    POC Ketones, Urine Negative Negative    POC Protein, Urine Positive (A) Negative    POC Glucose, Urine Negative Negative    pH, UA 5.5     POC Specific Gravity, Urine 1.025 1.003 - 1.029    POC Leukocytes, Urine Positive (A) Negative    POC Nitrates, Urine Positive (A) Negative           Assessment:       1. Urinary tract infection with hematuria, site  unspecified          Plan:         Urinary tract infection with hematuria, site unspecified  -     POCT Urinalysis, Dipstick, Automated, W/O Scope  -     Culture, Urine  -     ciprofloxacin HCl (CIPRO) 500 MG tablet; Take 1 tablet (500 mg total) by mouth 2 (two) times daily. for 5 days  Dispense: 10 tablet; Refill: 0    Discussed urinalysis today.   Discussed will send urine for culture and call with results in 3-5 days.   Discussed begin taking antibiotics as prescribed for suspected UTI. Drink cranberry juice, increase fluids, and take over the counter pyridium for symptom control.   Return to Clinic if symptoms worsen, change, or persist.   ER precautions for red flag symptoms discussed with patient.     Patient verbalized understanding and agrees with plan.     Sharon Simental PA-C    Patient Instructions                                                                       UTI   If your condition worsens or fails to improve we recommend that you receive another evaluation at the ER immediately or contact your PCP to discuss your concerns or return here. You must understand that you've received an urgent care treatment only and that you may be released before all your medical problems are known or treated. You the patient will arrange for followup care as instructed.   If you were prescribed antibiotics, please take them to full completion.    If you had cultures done it will take 3-5 days to result. We will call you with the result.   If you are are female and on BCP use additional methods to prevent pregnancy while on the antibiotics and for one cycle after.   Cranberry juice may help. Get the 100% cranberry juice and mix 4 oz of juice with 4 oz of water and drink this 8 oz glass of liquid once a day.

## 2022-07-12 NOTE — TELEPHONE ENCOUNTER
Spoke with patient unable to find an appointment today appointment made for 140 tomorrow with Dr Bertrand. Patient was also told he does not have to wait until tomorrow if he feels bad he can go to Urgent Care. Patient verbalized understanding and will decide what he wants to do

## 2022-07-14 LAB — BACTERIA UR CULT: ABNORMAL

## 2022-07-15 ENCOUNTER — TELEPHONE (OUTPATIENT)
Dept: URGENT CARE | Facility: CLINIC | Age: 57
End: 2022-07-15
Payer: COMMERCIAL

## 2022-07-15 NOTE — TELEPHONE ENCOUNTER
Patient notified of urine culture results.  Advised to complete antibiotic course.  All questions answered.    Chris Neumann PA-C

## 2022-07-28 ENCOUNTER — OFFICE VISIT (OUTPATIENT)
Dept: PODIATRY | Facility: CLINIC | Age: 57
End: 2022-07-28
Payer: COMMERCIAL

## 2022-07-28 ENCOUNTER — PATIENT MESSAGE (OUTPATIENT)
Dept: PODIATRY | Facility: CLINIC | Age: 57
End: 2022-07-28

## 2022-07-28 VITALS — HEIGHT: 72 IN | BODY MASS INDEX: 39.8 KG/M2 | WEIGHT: 293.88 LBS

## 2022-07-28 DIAGNOSIS — B35.1 ONYCHOMYCOSIS: ICD-10-CM

## 2022-07-28 DIAGNOSIS — B35.3 TINEA PEDIS OF BOTH FEET: ICD-10-CM

## 2022-07-28 DIAGNOSIS — E66.01 SEVERE OBESITY (BMI 35.0-39.9) WITH COMORBIDITY: ICD-10-CM

## 2022-07-28 DIAGNOSIS — L85.3 XEROSIS CUTIS: Primary | ICD-10-CM

## 2022-07-28 PROCEDURE — 99204 PR OFFICE/OUTPT VISIT, NEW, LEVL IV, 45-59 MIN: ICD-10-PCS | Mod: S$GLB,,, | Performed by: PODIATRIST

## 2022-07-28 PROCEDURE — 99204 OFFICE O/P NEW MOD 45 MIN: CPT | Mod: S$GLB,,, | Performed by: PODIATRIST

## 2022-07-28 PROCEDURE — 99999 PR PBB SHADOW E&M-EST. PATIENT-LVL III: CPT | Mod: PBBFAC,,, | Performed by: PODIATRIST

## 2022-07-28 PROCEDURE — 99999 PR PBB SHADOW E&M-EST. PATIENT-LVL III: ICD-10-PCS | Mod: PBBFAC,,, | Performed by: PODIATRIST

## 2022-07-28 RX ORDER — TERBINAFINE HYDROCHLORIDE 250 MG/1
250 TABLET ORAL DAILY
Qty: 90 TABLET | Refills: 0 | Status: SHIPPED | OUTPATIENT
Start: 2022-07-28 | End: 2022-10-26

## 2022-07-28 RX ORDER — CLOTRIMAZOLE AND BETAMETHASONE DIPROPIONATE 10; .5 MG/ML; MG/ML
LOTION TOPICAL 2 TIMES DAILY
Qty: 30 ML | Refills: 2 | Status: SHIPPED | OUTPATIENT
Start: 2022-07-28 | End: 2022-08-01 | Stop reason: SDUPTHER

## 2022-07-28 NOTE — PROGRESS NOTES
Subjective:       Patient ID: Levy Mckoy is a 57 y.o. male.    Chief Complaint: Nail Problem (Pt c/o bilateral hallux toenail fungus, 0/10 pain at present, prediabetic, pcp  last seen 03/03/2022)    HPI: Levy Mckoy presents to the clinic today with the chief complaint of persistent pruritus and burning to the left and rigth foot. Patient states these symptoms have been on going for several weeks to a month or so. Patient has tried OTC topical medications. States no prior evaluation by MD/DO/DPM. 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 States malodor to the foot at days end, and/or after wearing socks/shoes/sneakers/boots for several hours. States red, dry and flaky skin as well. Also states Athlete's foot and dry skin.      Review of patient's allergies indicates:  No Known Allergies    Past Medical History:   Diagnosis Date    BPH (benign prostatic hyperplasia)        Family History   Problem Relation Age of Onset    Diabetes Father     Coronary artery disease Father        Social History     Socioeconomic History    Marital status:    Tobacco Use    Smoking status: Never Smoker    Smokeless tobacco: Never Used   Substance and Sexual Activity    Alcohol use: Yes     Comment: very rare    Drug use: No    Sexual activity: Yes     Partners: Female       Past Surgical History:   Procedure Laterality Date    APPENDECTOMY      CYST REMOVAL      right ankle         Review of Systems   Constitutional: Negative for chills, fatigue and fever.   HENT: Negative for hearing loss.    Eyes: Negative for photophobia and visual disturbance.   Respiratory: Negative for cough, chest tightness, shortness of breath and wheezing.    Cardiovascular: Negative for chest pain and palpitations.   Gastrointestinal: Negative for constipation, diarrhea, nausea and vomiting.   Endocrine: Negative for cold intolerance and heat intolerance.   Genitourinary: Negative for flank pain.    Musculoskeletal: Negative for neck pain and neck stiffness.   Skin: Positive for rash.   Neurological: Negative for light-headedness and headaches.   Psychiatric/Behavioral: Negative for sleep disturbance.          Objective:   Ht 6' (1.829 m)   Wt 133.3 kg (293 lb 14 oz)   BMI 39.86 kg/m²       Physical Exam    LOWER EXTREMITY PHYSICAL EXAMINATION  DERMATOLOGY:  Substantial flaky, erythematous, xerotic like lesions are noted to the B/L.  As per the patient, the aforementioned areas are moderately pruritic. Moderate to severe xerosis is noted.     ORTHOPEDIC: Manual Muscle Testing is 5/5 in all planes on the left and right, without pains, with and without resistance. Gait pattern is non-antalgic.    VASCULAR: Pulses are palpable to the B/L lower extremity. The right dorsalis pedis pulse is 2/4 and the posterior tibial pulse is 2/4. The left dorsalis pedis pulse is 2/4 and the posterior tibial pulse is 2/4. Hair growth is noted on the dorsal foot and digits. Proximal to distal, warm to warm. Capillary refill time is WNL at less than 3s.      Assessment:     1. Xerosis cutis    2. Onychomycosis    3. Tinea pedis of both feet    4. Severe obesity (BMI 35.0-39.9) with comorbidity        Plan:     Xerosis cutis  Recommend twice to 3 times daily topical emollient. Did discuss with the patient concerning dry and thin skin in relation to complications due to comorbid states. Patient will purchase OTC Urea 40% and use BID or TID or OTC Eucerin Lotion/Cream and use it BID or TID.    Onychomycosis  -     terbinafine HCL (LAMISIL) 250 mg tablet; Take 1 tablet (250 mg total) by mouth once daily.  Dispense: 90 tablet; Refill: 0  -     COMPREHENSIVE METABOLIC PANEL; Future; Expected date: 07/28/2022  -     clotrimazole-betamethasone (LOTRISONE) lotion; Apply topically 2 (two) times daily.  Dispense: 30 mL; Refill: 2    Discussed the various treatment options concerning onychomycosis, these being over-the-counter topicals  (efficacy is low in regards to cure), prescription strength topicals (better efficacy as compared to OTC versions, but only slightly so, and potentially costly), laser therapy (which is not covered by insurance companies), oral medications (patient is advised that there is a potential, though less than 5%, for the potential of adverse health and side effects; namely liver pathology) and doing nothing (frequent debridements) as onychomycosis is a cosmetic ailment, and has no potential for long-term deleterious effects on the health. Did discuss the sides effects of PO therapy and what to monitor for, namely, headache, diarrhea, itching and rash, indigestion, liver enzyme abnormalities, taste disturbance, nausea, abdominal pain, flatulence (gas), vomiting and upper respiratory tract infection. Rx. for 90 days course is provided. Repeat LFTs in 60-90 days.    Tinea pedis of both feet  -     terbinafine HCL (LAMISIL) 250 mg tablet; Take 1 tablet (250 mg total) by mouth once daily.  Dispense: 90 tablet; Refill: 0  -     COMPREHENSIVE METABOLIC PANEL; Future; Expected date: 07/28/2022  -     clotrimazole-betamethasone (LOTRISONE) lotion; Apply topically 2 (two) times daily.  Dispense: 30 mL; Refill: 2    Recommend moisture wicking socks to alleviate the hyperhidrosis which makes one prone to recurrent tinea pedis.  I also recommend to alternate shoe gear, meaning, Monday, Wednesday, Friday, Saturday/Sunday and Tuesday, Thursday, Saturday/Sunday.  I do also recommend Tinactin antifungal spray to shoes daily.  After the aforementioned, put the shoes aside and allow to dry overnight and/or one full day.  Patient may purchase OTC Gold Bond Powder and use to his/her shoes daily prior to putting them on. He/She may also sprinkle a slight amount of powder to the foot prior to putting on socks.    Severe obesity (BMI 35.0-39.9) with comorbidity  Patient is counseled and reminded concerning the importance of good nutrition and  healthy eating habits, which may include blood sugar control, to prevent and/or help podiatric foot and ankle complications.            Future Appointments   Date Time Provider Department Center   10/18/2022  2:00 PM Adis Armando MD [facility] UROLOGY [facility]

## 2022-08-01 ENCOUNTER — OFFICE VISIT (OUTPATIENT)
Dept: URGENT CARE | Facility: CLINIC | Age: 57
End: 2022-08-01
Payer: COMMERCIAL

## 2022-08-01 VITALS
TEMPERATURE: 99 F | OXYGEN SATURATION: 96 % | HEIGHT: 72 IN | DIASTOLIC BLOOD PRESSURE: 83 MMHG | BODY MASS INDEX: 39.68 KG/M2 | SYSTOLIC BLOOD PRESSURE: 160 MMHG | HEART RATE: 95 BPM | RESPIRATION RATE: 18 BRPM | WEIGHT: 293 LBS

## 2022-08-01 DIAGNOSIS — N39.0 COMPLICATED UTI (URINARY TRACT INFECTION): Primary | ICD-10-CM

## 2022-08-01 DIAGNOSIS — B35.3 TINEA PEDIS OF BOTH FEET: ICD-10-CM

## 2022-08-01 DIAGNOSIS — B35.1 ONYCHOMYCOSIS: ICD-10-CM

## 2022-08-01 DIAGNOSIS — R30.0 DYSURIA: ICD-10-CM

## 2022-08-01 LAB
BILIRUB UR QL STRIP: NEGATIVE
GLUCOSE UR QL STRIP: NEGATIVE
KETONES UR QL STRIP: NEGATIVE
LEUKOCYTE ESTERASE UR QL STRIP: POSITIVE
PH, POC UA: 6
POC BLOOD, URINE: NEGATIVE
POC NITRATES, URINE: NEGATIVE
PROT UR QL STRIP: NEGATIVE
SP GR UR STRIP: 1.02 (ref 1–1.03)
UROBILINOGEN UR STRIP-ACNC: NORMAL (ref 0.3–2.2)

## 2022-08-01 PROCEDURE — 87077 CULTURE AEROBIC IDENTIFY: CPT | Performed by: NURSE PRACTITIONER

## 2022-08-01 PROCEDURE — 96372 PR INJECTION,THERAP/PROPH/DIAG2ST, IM OR SUBCUT: ICD-10-PCS | Mod: S$GLB,,, | Performed by: NURSE PRACTITIONER

## 2022-08-01 PROCEDURE — 87088 URINE BACTERIA CULTURE: CPT | Performed by: NURSE PRACTITIONER

## 2022-08-01 PROCEDURE — 99213 PR OFFICE/OUTPT VISIT, EST, LEVL III, 20-29 MIN: ICD-10-PCS | Mod: 25,S$GLB,, | Performed by: NURSE PRACTITIONER

## 2022-08-01 PROCEDURE — 81003 POCT URINALYSIS, DIPSTICK, AUTOMATED, W/O SCOPE: ICD-10-PCS | Mod: QW,S$GLB,, | Performed by: NURSE PRACTITIONER

## 2022-08-01 PROCEDURE — 87086 URINE CULTURE/COLONY COUNT: CPT | Performed by: NURSE PRACTITIONER

## 2022-08-01 PROCEDURE — 87186 SC STD MICRODIL/AGAR DIL: CPT | Performed by: NURSE PRACTITIONER

## 2022-08-01 PROCEDURE — 99213 OFFICE O/P EST LOW 20 MIN: CPT | Mod: 25,S$GLB,, | Performed by: NURSE PRACTITIONER

## 2022-08-01 PROCEDURE — 96372 THER/PROPH/DIAG INJ SC/IM: CPT | Mod: S$GLB,,, | Performed by: NURSE PRACTITIONER

## 2022-08-01 PROCEDURE — 81003 URINALYSIS AUTO W/O SCOPE: CPT | Mod: QW,S$GLB,, | Performed by: NURSE PRACTITIONER

## 2022-08-01 RX ORDER — CLOTRIMAZOLE AND BETAMETHASONE DIPROPIONATE 10; .5 MG/ML; MG/ML
LOTION TOPICAL 2 TIMES DAILY
Qty: 30 ML | Refills: 2 | Status: SHIPPED | OUTPATIENT
Start: 2022-08-01 | End: 2023-11-02

## 2022-08-01 RX ORDER — NITROFURANTOIN 25; 75 MG/1; MG/1
100 CAPSULE ORAL 2 TIMES DAILY
Qty: 14 CAPSULE | Refills: 0 | Status: SHIPPED | OUTPATIENT
Start: 2022-08-01 | End: 2022-08-08

## 2022-08-01 RX ORDER — CEFTRIAXONE 1 G/1
1 INJECTION, POWDER, FOR SOLUTION INTRAMUSCULAR; INTRAVENOUS
Status: COMPLETED | OUTPATIENT
Start: 2022-08-01 | End: 2022-08-01

## 2022-08-01 RX ORDER — LIDOCAINE HYDROCHLORIDE 10 MG/ML
2 INJECTION INFILTRATION; PERINEURAL
Status: COMPLETED | OUTPATIENT
Start: 2022-08-01 | End: 2022-08-01

## 2022-08-01 RX ADMIN — LIDOCAINE HYDROCHLORIDE 2 ML: 10 INJECTION INFILTRATION; PERINEURAL at 07:08

## 2022-08-01 RX ADMIN — CEFTRIAXONE 1 G: 1 INJECTION, POWDER, FOR SOLUTION INTRAMUSCULAR; INTRAVENOUS at 07:08

## 2022-08-01 NOTE — PROGRESS NOTES
Subjective:       Patient ID: Levy Mckoy is a 57 y.o. male.    Vitals:  height is 6' (1.829 m) and weight is 132.9 kg (293 lb). His temperature is 98.8 °F (37.1 °C). His blood pressure is 160/83 (abnormal) and his pulse is 95. His respiration is 18 and oxygen saturation is 96%.     Chief Complaint: Dysuria    Pt states he feels like he has another UTI. Pt states the pain started today. Pt also states he had a fever of 101 today and took tylenol about an hour ago.    Dysuria   This is a recurrent problem. The current episode started today. The problem occurs every urination. The problem has been unchanged. The quality of the pain is described as aching. The pain is at a severity of 7/10. The pain is mild. The maximum temperature recorded prior to his arrival was 101 - 101.9 F. The fever has been present for less than 1 day. Associated symptoms include frequency and urgency. Pertinent negatives include no behavior changes, chills, discharge, flank pain, hematuria, hesitancy, nausea, possible pregnancy, sweats, vomiting, weight loss, bubble bath use, constipation, rash or withholding. He has tried nothing for the symptoms. The treatment provided no relief. His past medical history is significant for recurrent UTIs. There is no history of catheterization, diabetes insipidus, diabetes mellitus, genitourinary reflux, hypertension, kidney stones, a single kidney, STD, urinary stasis or a urological procedure.       Constitution: Negative for chills.   Gastrointestinal: Negative for nausea, vomiting and constipation.   Genitourinary: Positive for dysuria, frequency and urgency. Negative for flank pain and hematuria.   Skin: Negative for rash.       Objective:      Physical Exam   Constitutional: He is oriented to person, place, and time. He appears well-developed. No distress.   HENT:   Head: Normocephalic and atraumatic.   Ears:   Right Ear: External ear normal.   Left Ear: External ear normal.   Nose: Nose normal.  No nasal deformity. No epistaxis.   Mouth/Throat: Oropharynx is clear and moist and mucous membranes are normal.   Eyes: Conjunctivae and lids are normal.   Neck: Trachea normal and phonation normal. Neck supple.   Cardiovascular: Normal rate, regular rhythm and normal heart sounds.   Pulmonary/Chest: Effort normal and breath sounds normal. He has no decreased breath sounds. He has no wheezes.   Abdominal: Normal appearance. There is left CVA tenderness and right CVA tenderness.   Musculoskeletal: Normal range of motion.         General: Normal range of motion.   Neurological: He is alert and oriented to person, place, and time. He has normal reflexes.   Skin: Skin is warm, dry, intact and not diaphoretic.   Psychiatric: His speech is normal and behavior is normal. Judgment and thought content normal.   Nursing note and vitals reviewed.        Assessment:       1. Acute cystitis without hematuria    2. Dysuria          Plan:         Acute cystitis without hematuria  -     cefTRIAXone injection 1 g  -     LIDOcaine HCL 10 mg/ml (1%) injection 2 mL    Dysuria  -     POCT Urinalysis, Dipstick, Automated, W/O Scope                 Results for orders placed or performed in visit on 08/01/22   POCT Urinalysis, Dipstick, Automated, W/O Scope   Result Value Ref Range    POC Blood, Urine Negative Negative    POC Bilirubin, Urine Negative Negative    POC Urobilinogen, Urine normal 0.3 - 2.2    POC Ketones, Urine Negative Negative    POC Protein, Urine Negative Negative    POC Nitrates, Urine Negative Negative    POC Glucose, Urine Negative Negative    pH, UA 6.0     POC Specific Gravity, Urine 1.020 1.003 - 1.029    POC Leukocytes, Urine Positive (A) Negative     Lab result reviewed and discussed with patient.    · Increase fluids (avoid caffeine or sugary beverages as these will irritate the bladder).   · Take your antibiotics exactly as prescribed and make sure to complete entire course even if you begin to feel better.  This will prevent recurrence of infection and antibiotic resistance.   · We have prescribed an antibiotic that covers most bacteria that cause urinary tract infections, however, if your urine culture shows that you have any bacterial resistance to the antibiotic you were prescribed or an unusual bacterial strain, we will notify you and make any necessary changes. Urine cultures usually result in about three days.   · Be sure you are wiping front to back.  · Avoid holding urine when you feel as though you have to urinate.  · Please follow up with your primary care provider if your symptoms do not improve within 2-3 days or sooner for any new or worsening symptoms.  · For any severe pain, bright red blood in urine, difficulty urinating, fever that does not improve with Tylenol or Ibuprofen, inability to urinate, incontinence of urine or bowels, or for any other urgent concerns, please go to the ER for immediate evaluation.

## 2022-08-02 ENCOUNTER — PATIENT MESSAGE (OUTPATIENT)
Dept: FAMILY MEDICINE | Facility: CLINIC | Age: 57
End: 2022-08-02
Payer: COMMERCIAL

## 2022-08-05 LAB — BACTERIA UR CULT: ABNORMAL

## 2022-08-22 DIAGNOSIS — E78.5 DYSLIPIDEMIA: ICD-10-CM

## 2022-08-22 RX ORDER — METFORMIN HYDROCHLORIDE 500 MG/1
TABLET, EXTENDED RELEASE ORAL
Qty: 90 TABLET | Refills: 0 | Status: SHIPPED | OUTPATIENT
Start: 2022-08-22 | End: 2022-10-26

## 2022-08-22 NOTE — TELEPHONE ENCOUNTER
Care Due:                  Date            Visit Type   Department     Provider  --------------------------------------------------------------------------------                                EP -                              PRIMARY      [facility] INTERNAL  Michelle MENDOZA  Last Visit: 03-      CARE ([facility])   MEDICINE       Kathia  Next Visit: None Scheduled  None         None Found                                                            Last  Test          Frequency    Reason                     Performed    Due Date  --------------------------------------------------------------------------------    HBA1C.......  6 months...  metFORMIN................  Not Found    Overdue    [employer] Embedded Care Gaps. Reference number: [id number]. 8/22/2022   4:05:29 PM CDT

## 2022-09-19 ENCOUNTER — TELEPHONE (OUTPATIENT)
Dept: PULMONOLOGY | Facility: CLINIC | Age: 57
End: 2022-09-19
Payer: COMMERCIAL

## 2022-09-19 NOTE — TELEPHONE ENCOUNTER
----- Message from Sharona Song sent at 9/19/2022  4:02 PM CDT -----  Contact: Levy  Patient is calling to speak with someone regarding supplies. Patient request a renewed prescription for CPAP supplies is sent to [facility]. Please give patient a call back at [phone] to discuss further.  Thank you,  GH

## 2022-10-10 ENCOUNTER — PATIENT MESSAGE (OUTPATIENT)
Dept: FAMILY MEDICINE | Facility: CLINIC | Age: 57
End: 2022-10-10
Payer: COMMERCIAL

## 2022-10-11 ENCOUNTER — PATIENT MESSAGE (OUTPATIENT)
Dept: FAMILY MEDICINE | Facility: CLINIC | Age: 57
End: 2022-10-11
Payer: COMMERCIAL

## 2022-10-11 DIAGNOSIS — R73.03 PRE-DIABETES: Primary | ICD-10-CM

## 2022-10-13 ENCOUNTER — PATIENT MESSAGE (OUTPATIENT)
Dept: UROLOGY | Facility: CLINIC | Age: 57
End: 2022-10-13
Payer: COMMERCIAL

## 2022-10-13 DIAGNOSIS — E29.1 HYPOGONADISM IN MALE: ICD-10-CM

## 2022-10-17 ENCOUNTER — PATIENT MESSAGE (OUTPATIENT)
Dept: FAMILY MEDICINE | Facility: CLINIC | Age: 57
End: 2022-10-17
Payer: COMMERCIAL

## 2022-10-17 RX ORDER — TESTOSTERONE CYPIONATE 200 MG/ML
INJECTION, SOLUTION INTRAMUSCULAR
Qty: 5 ML | Refills: 1 | Status: SHIPPED | OUTPATIENT
Start: 2022-10-17 | End: 2022-12-07 | Stop reason: SDUPTHER

## 2022-10-25 ENCOUNTER — TELEPHONE (OUTPATIENT)
Dept: PULMONOLOGY | Facility: CLINIC | Age: 57
End: 2022-10-25
Payer: COMMERCIAL

## 2022-10-26 ENCOUNTER — OFFICE VISIT (OUTPATIENT)
Dept: FAMILY MEDICINE | Facility: CLINIC | Age: 57
End: 2022-10-26
Attending: FAMILY MEDICINE
Payer: COMMERCIAL

## 2022-10-26 VITALS
OXYGEN SATURATION: 95 % | TEMPERATURE: 99 F | HEART RATE: 82 BPM | DIASTOLIC BLOOD PRESSURE: 82 MMHG | WEIGHT: 306.75 LBS | SYSTOLIC BLOOD PRESSURE: 140 MMHG | BODY MASS INDEX: 41.55 KG/M2 | HEIGHT: 72 IN

## 2022-10-26 DIAGNOSIS — G47.30 SLEEP APNEA, UNSPECIFIED TYPE: ICD-10-CM

## 2022-10-26 DIAGNOSIS — R73.03 PRE-DIABETES: Primary | ICD-10-CM

## 2022-10-26 DIAGNOSIS — E66.01 MORBID OBESITY WITH BMI OF 40.0-44.9, ADULT: ICD-10-CM

## 2022-10-26 DIAGNOSIS — E78.5 DYSLIPIDEMIA: ICD-10-CM

## 2022-10-26 PROCEDURE — 99999 PR PBB SHADOW E&M-EST. PATIENT-LVL IV: CPT | Mod: PBBFAC,,, | Performed by: FAMILY MEDICINE

## 2022-10-26 PROCEDURE — 90750 HZV VACC RECOMBINANT IM: CPT | Mod: S$GLB,,, | Performed by: FAMILY MEDICINE

## 2022-10-26 PROCEDURE — 99999 PR PBB SHADOW E&M-EST. PATIENT-LVL IV: ICD-10-PCS | Mod: PBBFAC,,, | Performed by: FAMILY MEDICINE

## 2022-10-26 PROCEDURE — 99214 PR OFFICE/OUTPT VISIT, EST, LEVL IV, 30-39 MIN: ICD-10-PCS | Mod: 25,S$GLB,, | Performed by: FAMILY MEDICINE

## 2022-10-26 PROCEDURE — 90750 ZOSTER RECOMBINANT VACCINE: ICD-10-PCS | Mod: S$GLB,,, | Performed by: FAMILY MEDICINE

## 2022-10-26 PROCEDURE — 99214 OFFICE O/P EST MOD 30 MIN: CPT | Mod: 25,S$GLB,, | Performed by: FAMILY MEDICINE

## 2022-10-26 PROCEDURE — 90471 ZOSTER RECOMBINANT VACCINE: ICD-10-PCS | Mod: S$GLB,,, | Performed by: FAMILY MEDICINE

## 2022-10-26 PROCEDURE — 90471 IMMUNIZATION ADMIN: CPT | Mod: S$GLB,,, | Performed by: FAMILY MEDICINE

## 2022-10-26 RX ORDER — SEMAGLUTIDE 0.25 MG/.5ML
0.25 INJECTION, SOLUTION SUBCUTANEOUS
Qty: 0.5 ML | Refills: 3 | Status: SHIPPED | OUTPATIENT
Start: 2022-10-26 | End: 2023-01-09

## 2022-10-26 NOTE — PROGRESS NOTES
Subjective:       Patient ID: Levy Mckoy is a 57 y.o. male.    Chief Complaint: Follow-up    57 y old male here for follow up . Will like to start an appetite suppressant . Stays active at work . No longer drinking sodas . Wears CPAP. Will like to get zoster vaccine . He has not been monitoring BP at home . He just had labs done and WH ( stable pre dm, dlp)     Review of Systems   Constitutional: Negative.    HENT: Negative.     Eyes: Negative.    Respiratory: Negative.     Cardiovascular: Negative.    Gastrointestinal: Negative.    Genitourinary: Negative.    Musculoskeletal: Negative.    Skin: Negative.    Hematological: Negative.      Objective:      Physical Exam  Constitutional:       General: He is not in acute distress.     Appearance: He is well-developed. He is not diaphoretic.   HENT:      Head: Normocephalic and atraumatic.      Right Ear: External ear normal.      Left Ear: External ear normal.      Nose: Nose normal.      Mouth/Throat:      Pharynx: No oropharyngeal exudate.   Eyes:      General: No scleral icterus.        Right eye: No discharge.         Left eye: No discharge.      Conjunctiva/sclera: Conjunctivae normal.      Pupils: Pupils are equal, round, and reactive to light.   Neck:      Thyroid: No thyromegaly.      Vascular: No JVD.      Trachea: No tracheal deviation.   Cardiovascular:      Rate and Rhythm: Normal rate and regular rhythm.      Heart sounds: Normal heart sounds. No murmur heard.    No friction rub. No gallop.   Pulmonary:      Effort: Pulmonary effort is normal. No respiratory distress.      Breath sounds: Normal breath sounds. No stridor. No wheezing or rales.   Chest:      Chest wall: No tenderness.   Abdominal:      General: Bowel sounds are normal. There is no distension.      Palpations: Abdomen is soft.      Tenderness: There is no abdominal tenderness. There is no guarding or rebound.   Musculoskeletal:         General: No tenderness. Normal range of motion.       Cervical back: Normal range of motion and neck supple.   Lymphadenopathy:      Cervical: No cervical adenopathy.   Skin:     General: Skin is warm and dry.      Coloration: Skin is not pale.      Findings: No erythema or rash.   Neurological:      Mental Status: He is alert and oriented to person, place, and time.      Cranial Nerves: No cranial nerve deficit.      Motor: No abnormal muscle tone.      Coordination: Coordination normal.      Deep Tendon Reflexes: Reflexes are normal and symmetric. Reflexes normal.   Psychiatric:         Behavior: Behavior normal.         Thought Content: Thought content normal.         Judgment: Judgment normal.       Assessment:        Levy was seen today for follow-up.    Diagnoses and all orders for this visit:    Pre-diabetes  -     semaglutide, weight loss, (WEGOVY) 0.25 mg/0.5 mL PnIj; Inject 0.25 mg into the skin every 7 days.    Morbid obesity with BMI of 40.0-44.9, adult  -     semaglutide, weight loss, (WEGOVY) 0.25 mg/0.5 mL PnIj; Inject 0.25 mg into the skin every 7 days.    Sleep apnea, unspecified type    Dyslipidemia    Other orders  -     (In Office Administered) Zoster Recombinant Vaccine     Plan:   Wegovy . Side effects discussed   Diet and exercise   CPAP   Will monitor BP at home . N/v in 3 w   Statin?

## 2022-10-28 ENCOUNTER — PATIENT MESSAGE (OUTPATIENT)
Dept: FAMILY MEDICINE | Facility: CLINIC | Age: 57
End: 2022-10-28
Payer: COMMERCIAL

## 2022-11-07 ENCOUNTER — PATIENT MESSAGE (OUTPATIENT)
Dept: FAMILY MEDICINE | Facility: CLINIC | Age: 57
End: 2022-11-07
Payer: COMMERCIAL

## 2022-11-07 ENCOUNTER — OFFICE VISIT (OUTPATIENT)
Dept: PULMONOLOGY | Facility: CLINIC | Age: 57
End: 2022-11-07
Payer: COMMERCIAL

## 2022-11-07 VITALS
WEIGHT: 306.56 LBS | HEIGHT: 72 IN | SYSTOLIC BLOOD PRESSURE: 124 MMHG | OXYGEN SATURATION: 95 % | BODY MASS INDEX: 41.52 KG/M2 | RESPIRATION RATE: 18 BRPM | HEART RATE: 96 BPM | DIASTOLIC BLOOD PRESSURE: 80 MMHG

## 2022-11-07 DIAGNOSIS — E66.01 CLASS 3 SEVERE OBESITY DUE TO EXCESS CALORIES WITH SERIOUS COMORBIDITY AND BODY MASS INDEX (BMI) OF 40.0 TO 44.9 IN ADULT: ICD-10-CM

## 2022-11-07 DIAGNOSIS — G47.33 OBSTRUCTIVE SLEEP APNEA: Primary | ICD-10-CM

## 2022-11-07 PROCEDURE — 99999 PR PBB SHADOW E&M-EST. PATIENT-LVL IV: CPT | Mod: PBBFAC,,, | Performed by: PHYSICIAN ASSISTANT

## 2022-11-07 PROCEDURE — 99203 PR OFFICE/OUTPT VISIT, NEW, LEVL III, 30-44 MIN: ICD-10-PCS | Mod: S$GLB,,, | Performed by: PHYSICIAN ASSISTANT

## 2022-11-07 PROCEDURE — 99999 PR PBB SHADOW E&M-EST. PATIENT-LVL IV: ICD-10-PCS | Mod: PBBFAC,,, | Performed by: PHYSICIAN ASSISTANT

## 2022-11-07 PROCEDURE — 99203 OFFICE O/P NEW LOW 30 MIN: CPT | Mod: S$GLB,,, | Performed by: PHYSICIAN ASSISTANT

## 2022-11-07 NOTE — ASSESSMENT & PLAN NOTE
Compliant  Benefits from use  Discussed therapeutic goals for CPAP: Ideal usage 100% of nights for 6-8 hours per night. Minimum usage is 70% of night for at least 4 hours per night.  Cpap 10 and FFM ordered today

## 2022-11-07 NOTE — PROGRESS NOTES
Subjective:       Patient ID: Levy Mckoy is a 57 y.o. male.    Chief Complaint: Sleep Apnea      58yo male here for JULI on CPAP follow up  Using AutoPAP 10-20, FFM  Patient states improved symptoms with use of CPAP. Sleeping more soundly. Waking up feeling more refreshed. Improved daytime sleepiness.  Using machine >10 years old  Sleep study completed 2019, saw Dr. Pedraza  Requesting new machine order    EPWORTH SLEEPINESS SCALE 11/7/2022   Sitting and reading 0   Watching TV 0   Sitting, inactive in a public place (e.g. a theatre or a meeting) 0   As a passenger in a car for an hour without a break 0   Lying down to rest in the afternoon when circumstances permit 0   Sitting and talking to someone 0   Sitting quietly after a lunch without alcohol 0   In a car, while stopped for a few minutes in traffic 0   Total score 0         Immunization History   Administered Date(s) Administered    COVID-19, MRNA, LN-S, PF (Pfizer) (Purple Cap) 03/22/2021, 04/13/2021    Zoster Recombinant 10/26/2022      Tobacco Use: Low Risk     Smoking Tobacco Use: Never    Smokeless Tobacco Use: Never    Passive Exposure: Not on file      Past Medical History:   Diagnosis Date    BPH (benign prostatic hyperplasia)     Sleep apnea, unspecified       Current Outpatient Medications on File Prior to Visit   Medication Sig Dispense Refill    clotrimazole-betamethasone (LOTRISONE) lotion Apply topically 2 (two) times daily. 30 mL 2    semaglutide, weight loss, (WEGOVY) 0.25 mg/0.5 mL PnIj Inject 0.25 mg into the skin every 7 days. 0.5 mL 3    tadalafiL (CIALIS) 20 MG Tab Take 1 tablet (20 mg total) by mouth once daily. 30 tablet 11    testosterone cypionate (DEPOTESTOTERONE CYPIONATE) 200 mg/mL injection INJECT 1ML INTO THE MUSCLE EVERY 21 DAYS 5 mL 1     No current facility-administered medications on file prior to visit.        Review of Systems   Constitutional:  Negative for fever, weight loss, appetite change, fatigue and weakness.    HENT:  Negative for postnasal drip, rhinorrhea, sinus pressure, trouble swallowing and congestion.    Respiratory:  Negative for cough, sputum production, choking, chest tightness, shortness of breath, wheezing and dyspnea on extertion.    Cardiovascular:  Negative for chest pain and leg swelling.   Musculoskeletal:  Negative for arthralgias, gait problem and joint swelling.   Gastrointestinal:  Negative for nausea, vomiting and abdominal pain.   Neurological:  Negative for dizziness, weakness and headaches.   All other systems reviewed and are negative.    Objective:       Vitals:    11/07/22 1411 11/07/22 1442   BP: 124/80    Pulse: (!) 115 96   Resp: 18    SpO2: 95%    Weight: (!) 139 kg (306 lb 8.8 oz)    Height: 6' (1.829 m)        Physical Exam   Constitutional: He is oriented to person, place, and time. He appears well-developed and well-nourished. No distress. He is obese.   HENT:   Head: Normocephalic.   Nose: Nose normal.   Mouth/Throat: Oropharynx is clear and moist.   Cardiovascular: Normal rate and regular rhythm.   Pulmonary/Chest: Effort normal and breath sounds normal. No respiratory distress. He has no wheezes. He has no rhonchi. He has no rales.   Musculoskeletal:         General: No edema.      Cervical back: Normal range of motion and neck supple.   Neurological: He is alert and oriented to person, place, and time. Gait normal.   Skin: Skin is warm and dry.   Psychiatric: He has a normal mood and affect.   Vitals reviewed.    Compliance Summary  8/5/2022 - 9/3/2022 (30 days)  Days with Device Usage 30 days  Days without Device Usage 0 days  Percent Days with Device Usage 100.0%  Cumulative Usage 9 days 20 hrs. 46 mins. 53 secs.  Maximum Usage (1 Day) 11 hrs. 22 mins. 56 secs.  Average Usage (All Days) 7 hrs. 53 mins. 33 secs.  Average Usage (Days Used) 7 hrs. 53 mins. 33 secs.  Minimum Usage (1 Day) 6 hrs. 51 mins. 6 secs.  Percent of Days with Usage >= 4 Hours 100.0%  Percent of Days with  Usage < 4 Hours 0.0%  Date Range  Total Blower Time 9 days 20 hrs. 46 mins. 53 secs.  Average AHI 4.4  Auto-CPAP Summary ([facility])  Auto-CPAP Mean Pressure 10.7 cmH2O  Auto-CPAP Peak Average Pressure 11.7 cmH2O  Device Pressure <= 90% of Time 12.9 cmH2O  Average Time in Large Leak Per Day 0 secs.  Printed By:      Assessment/Plan:       Problem List Items Addressed This Visit          Endocrine    Class 3 severe obesity due to excess calories with serious comorbidity and body mass index (BMI) of 40.0 to 44.9 in adult     Weight loss and exercise to improve overall health.              Other    Obstructive sleep apnea - Primary     Compliant  Benefits from use  Discussed therapeutic goals for CPAP: Ideal usage 100% of nights for 6-8 hours per night. Minimum usage is 70% of night for at least 4 hours per night.  Cpap 10 and FFM ordered today         Relevant Orders    CPAP/BIPAP SUPPLIES    CPAP FOR HOME USE     Follow up after initiation of CPAP and at least 31 days of use.    Discussed diagnosis, its evaluation, treatment and usual course. All questions answered.    Patient verbalized understanding of plan and left in no acute distress    Thank you for the courtesy of participating in the care of this patient    Thania Thompson PA-C

## 2022-11-11 ENCOUNTER — PATIENT MESSAGE (OUTPATIENT)
Dept: FAMILY MEDICINE | Facility: CLINIC | Age: 57
End: 2022-11-11
Payer: COMMERCIAL

## 2022-12-07 ENCOUNTER — PATIENT MESSAGE (OUTPATIENT)
Dept: FAMILY MEDICINE | Facility: CLINIC | Age: 57
End: 2022-12-07
Payer: COMMERCIAL

## 2023-01-06 ENCOUNTER — PATIENT MESSAGE (OUTPATIENT)
Dept: FAMILY MEDICINE | Facility: CLINIC | Age: 58
End: 2023-01-06
Payer: COMMERCIAL

## 2023-01-06 DIAGNOSIS — E66.01 MORBID OBESITY WITH BMI OF 40.0-44.9, ADULT: Primary | ICD-10-CM

## 2023-01-09 ENCOUNTER — OFFICE VISIT (OUTPATIENT)
Dept: PULMONOLOGY | Facility: CLINIC | Age: 58
End: 2023-01-09
Payer: COMMERCIAL

## 2023-01-09 VITALS
BODY MASS INDEX: 41.13 KG/M2 | DIASTOLIC BLOOD PRESSURE: 72 MMHG | RESPIRATION RATE: 16 BRPM | SYSTOLIC BLOOD PRESSURE: 130 MMHG | HEART RATE: 85 BPM | HEIGHT: 72 IN | OXYGEN SATURATION: 96 % | WEIGHT: 303.69 LBS

## 2023-01-09 DIAGNOSIS — G47.33 OSA (OBSTRUCTIVE SLEEP APNEA): Primary | ICD-10-CM

## 2023-01-09 DIAGNOSIS — E66.01 CLASS 3 SEVERE OBESITY DUE TO EXCESS CALORIES WITH SERIOUS COMORBIDITY AND BODY MASS INDEX (BMI) OF 40.0 TO 44.9 IN ADULT: ICD-10-CM

## 2023-01-09 PROCEDURE — 99999 PR PBB SHADOW E&M-EST. PATIENT-LVL III: CPT | Mod: PBBFAC,,, | Performed by: PHYSICIAN ASSISTANT

## 2023-01-09 PROCEDURE — 99999 PR PBB SHADOW E&M-EST. PATIENT-LVL III: ICD-10-PCS | Mod: PBBFAC,,, | Performed by: PHYSICIAN ASSISTANT

## 2023-01-09 PROCEDURE — 99213 PR OFFICE/OUTPT VISIT, EST, LEVL III, 20-29 MIN: ICD-10-PCS | Mod: S$GLB,,, | Performed by: PHYSICIAN ASSISTANT

## 2023-01-09 PROCEDURE — 99213 OFFICE O/P EST LOW 20 MIN: CPT | Mod: S$GLB,,, | Performed by: PHYSICIAN ASSISTANT

## 2023-01-09 RX ORDER — SEMAGLUTIDE 1.34 MG/ML
0.25 INJECTION, SOLUTION SUBCUTANEOUS
Qty: 1 PEN | Refills: 0 | Status: SHIPPED | OUTPATIENT
Start: 2023-01-09 | End: 2023-01-09 | Stop reason: SDUPTHER

## 2023-01-09 RX ORDER — SEMAGLUTIDE 1.34 MG/ML
0.25 INJECTION, SOLUTION SUBCUTANEOUS
Qty: 1 PEN | Refills: 0 | Status: SHIPPED | OUTPATIENT
Start: 2023-01-09 | End: 2023-02-06 | Stop reason: SDUPTHER

## 2023-01-09 NOTE — PROGRESS NOTES
Subjective:       Patient ID: Levy Mckoy is a 57 y.o. male.    Chief Complaint: Sleep Apnea        1/9/23  56yo male here for JULI on CPAP follow up  New CPAP initial download today  Compliance download reviewed, days with usage > 4 hours is 100%, average AHI is 4.4  Patient states improved symptoms with use of CPAP. Sleeping more soundly. Waking up feeling more refreshed. Improved daytime sleepiness.  Using AutoPAP 4-10, FFM, [facility]    11/7/22  56yo male here for JULI on CPAP follow up  Using AutoPAP 10-20, FFM  Patient states improved symptoms with use of CPAP. Sleeping more soundly. Waking up feeling more refreshed. Improved daytime sleepiness.  Using machine >10 years old  Sleep study completed 2019, saw Dr. Pedraza  Requesting new machine order    EPWORTH SLEEPINESS SCALE 11/7/2022   Sitting and reading 0   Watching TV 0   Sitting, inactive in a public place (e.g. a theatre or a meeting) 0   As a passenger in a car for an hour without a break 0   Lying down to rest in the afternoon when circumstances permit 0   Sitting and talking to someone 0   Sitting quietly after a lunch without alcohol 0   In a car, while stopped for a few minutes in traffic 0   Total score 0     EPWORTH SLEEPINESS SCALE 1/9/2023   Sitting and reading 0   Watching TV 0   Sitting, inactive in a public place (e.g. a theatre or a meeting) 0   As a passenger in a car for an hour without a break 0   Lying down to rest in the afternoon when circumstances permit 2   Sitting and talking to someone 0   Sitting quietly after a lunch without alcohol 0   In a car, while stopped for a few minutes in traffic 0   Total score 2         Immunization History   Administered Date(s) Administered    COVID-19, MRNA, LN-S, PF (Pfizer) (Purple Cap) 03/22/2021, 04/13/2021    Zoster Recombinant 10/26/2022      Tobacco Use: Low Risk     Smoking Tobacco Use: Never    Smokeless Tobacco Use: Never    Passive Exposure: Not on file      Past Medical History:    Diagnosis Date    BPH (benign prostatic hyperplasia)     Sleep apnea, unspecified       Current Outpatient Medications on File Prior to Visit   Medication Sig Dispense Refill    clotrimazole-betamethasone (LOTRISONE) lotion Apply topically 2 (two) times daily. 30 mL 2    semaglutide (OZEMPIC) 0.25 mg or 0.5 mg(2 mg/1.5 mL) pen injector Inject 0.25 mg into the skin every 7 days. 1 pen 0    tadalafiL (CIALIS) 20 MG Tab Take 1 tablet (20 mg total) by mouth once daily. 30 tablet 11    testosterone cypionate (DEPOTESTOTERONE CYPIONATE) 200 mg/mL injection INJECT 1ML INTO THE MUSCLE EVERY 21 DAYS 5 mL 1     No current facility-administered medications on file prior to visit.        Review of Systems   Constitutional:  Negative for fever, weight loss, appetite change, fatigue and weakness.   HENT:  Negative for postnasal drip, rhinorrhea, sinus pressure, trouble swallowing and congestion.    Respiratory:  Negative for cough, sputum production, choking, chest tightness, shortness of breath, wheezing and dyspnea on extertion.    Cardiovascular:  Negative for chest pain and leg swelling.   Musculoskeletal:  Negative for arthralgias, gait problem and joint swelling.   Gastrointestinal:  Negative for nausea, vomiting and abdominal pain.   Neurological:  Negative for dizziness, weakness and headaches.   All other systems reviewed and are negative.    Objective:       Vitals:    01/09/23 1445   BP: 130/72   Pulse: 85   Resp: 16   SpO2: 96%   Weight: (!) 137.8 kg (303 lb 10.9 oz)   Height: 6' (1.829 m)         Physical Exam   Constitutional: He is oriented to person, place, and time. He appears well-developed and well-nourished. No distress. He is obese.   HENT:   Head: Normocephalic.   Nose: Nose normal.   Mouth/Throat: Oropharynx is clear and moist.   Cardiovascular: Normal rate and regular rhythm.   Pulmonary/Chest: Effort normal and breath sounds normal. No respiratory distress. He has no wheezes. He has no rhonchi. He has  no rales.   Musculoskeletal:         General: No edema.      Cervical back: Normal range of motion and neck supple.   Neurological: He is alert and oriented to person, place, and time. Gait normal.   Skin: Skin is warm and dry.   Psychiatric: He has a normal mood and affect.   Vitals reviewed.    Compliance Summary  8/5/2022 - 9/3/2022 (30 days)  Days with Device Usage 30 days  Days without Device Usage 0 days  Percent Days with Device Usage 100.0%  Cumulative Usage 9 days 20 hrs. 46 mins. 53 secs.  Maximum Usage (1 Day) 11 hrs. 22 mins. 56 secs.  Average Usage (All Days) 7 hrs. 53 mins. 33 secs.  Average Usage (Days Used) 7 hrs. 53 mins. 33 secs.  Minimum Usage (1 Day) 6 hrs. 51 mins. 6 secs.  Percent of Days with Usage >= 4 Hours 100.0%  Percent of Days with Usage < 4 Hours 0.0%  Date Range  Total Blower Time 9 days 20 hrs. 46 mins. 53 secs.  Average AHI 4.4  Auto-CPAP Summary ([facility])  Auto-CPAP Mean Pressure 10.7 cmH2O  Auto-CPAP Peak Average Pressure 11.7 cmH2O  Device Pressure <= 90% of Time 12.9 cmH2O  Average Time in Large Leak Per Day 0 secs.  Printed By:      Assessment/Plan:       Problem List Items Addressed This Visit          Endocrine    Class 3 severe obesity due to excess calories with serious comorbidity and body mass index (BMI) of 40.0 to 44.9 in adult     Weight loss and exercise to improve overall health.              Other    ALFREDITO (obstructive sleep apnea) - Primary     Compliant with AutoPAP  Benefits from use  Discussed therapeutic goals for CPAP: Ideal usage 100% of nights for 6-8 hours per night. Minimum usage is 70% of night for at least 4 hours per night.  Follow up 1 year         Relevant Orders    CPAP/BIPAP SUPPLIES     Follow up in about 1 year (around 1/9/2024) for alfredito follow up.      Discussed diagnosis, its evaluation, treatment and usual course. All questions answered.    Patient verbalized understanding of plan and left in no acute distress    Thank you for the  courtesy of participating in the care of this patient    Thania Thompson PA-C

## 2023-01-10 ENCOUNTER — PATIENT MESSAGE (OUTPATIENT)
Dept: FAMILY MEDICINE | Facility: CLINIC | Age: 58
End: 2023-01-10
Payer: COMMERCIAL

## 2023-01-10 NOTE — ASSESSMENT & PLAN NOTE
Compliant with AutoPAP  Benefits from use  Discussed therapeutic goals for CPAP: Ideal usage 100% of nights for 6-8 hours per night. Minimum usage is 70% of night for at least 4 hours per night.  Follow up 1 year

## 2023-01-18 ENCOUNTER — PATIENT MESSAGE (OUTPATIENT)
Dept: FAMILY MEDICINE | Facility: CLINIC | Age: 58
End: 2023-01-18
Payer: COMMERCIAL

## 2023-02-01 ENCOUNTER — PATIENT MESSAGE (OUTPATIENT)
Dept: FAMILY MEDICINE | Facility: CLINIC | Age: 58
End: 2023-02-01
Payer: COMMERCIAL

## 2023-02-01 DIAGNOSIS — E66.01 MORBID OBESITY WITH BMI OF 40.0-44.9, ADULT: ICD-10-CM

## 2023-02-06 ENCOUNTER — PATIENT MESSAGE (OUTPATIENT)
Dept: FAMILY MEDICINE | Facility: CLINIC | Age: 58
End: 2023-02-06
Payer: COMMERCIAL

## 2023-02-06 RX ORDER — SEMAGLUTIDE 1.34 MG/ML
0.5 INJECTION, SOLUTION SUBCUTANEOUS
Qty: 1 PEN | Refills: 0 | Status: SHIPPED | OUTPATIENT
Start: 2023-02-06 | End: 2023-02-06

## 2023-02-07 ENCOUNTER — PATIENT MESSAGE (OUTPATIENT)
Dept: FAMILY MEDICINE | Facility: CLINIC | Age: 58
End: 2023-02-07
Payer: COMMERCIAL

## 2023-02-07 DIAGNOSIS — E66.01 MORBID OBESITY WITH BMI OF 40.0-44.9, ADULT: Primary | ICD-10-CM

## 2023-02-07 RX ORDER — SEMAGLUTIDE 0.5 MG/.5ML
0.5 INJECTION, SOLUTION SUBCUTANEOUS
Qty: 1 ML | Refills: 3 | Status: SHIPPED | OUTPATIENT
Start: 2023-02-07 | End: 2023-03-03

## 2023-02-18 ENCOUNTER — PATIENT MESSAGE (OUTPATIENT)
Dept: ADMINISTRATIVE | Facility: HOSPITAL | Age: 58
End: 2023-02-18
Payer: COMMERCIAL

## 2023-03-03 ENCOUNTER — PATIENT MESSAGE (OUTPATIENT)
Dept: FAMILY MEDICINE | Facility: CLINIC | Age: 58
End: 2023-03-03
Payer: COMMERCIAL

## 2023-03-03 DIAGNOSIS — E66.01 MORBID OBESITY WITH BMI OF 40.0-44.9, ADULT: Primary | ICD-10-CM

## 2023-03-03 DIAGNOSIS — Z12.11 SCREENING FOR COLON CANCER: ICD-10-CM

## 2023-03-03 RX ORDER — SEMAGLUTIDE 1 MG/.5ML
1 INJECTION, SOLUTION SUBCUTANEOUS
Qty: 0.5 ML | Refills: 3 | Status: SHIPPED | OUTPATIENT
Start: 2023-03-03 | End: 2023-04-03

## 2023-03-22 ENCOUNTER — PATIENT MESSAGE (OUTPATIENT)
Dept: UROLOGY | Facility: CLINIC | Age: 58
End: 2023-03-22
Payer: COMMERCIAL

## 2023-03-23 LAB — HEMOCCULT STL QL IA: NEGATIVE

## 2023-03-31 ENCOUNTER — PATIENT MESSAGE (OUTPATIENT)
Dept: FAMILY MEDICINE | Facility: CLINIC | Age: 58
End: 2023-03-31
Payer: COMMERCIAL

## 2023-03-31 DIAGNOSIS — E66.01 SEVERE OBESITY (BMI 35.0-39.9) WITH COMORBIDITY: Primary | ICD-10-CM

## 2023-04-03 RX ORDER — SEMAGLUTIDE 1.7 MG/.75ML
1.7 INJECTION, SOLUTION SUBCUTANEOUS
Qty: 0.75 ML | Refills: 3 | Status: SHIPPED | OUTPATIENT
Start: 2023-04-03 | End: 2023-04-04 | Stop reason: SDUPTHER

## 2023-04-04 ENCOUNTER — PATIENT MESSAGE (OUTPATIENT)
Dept: FAMILY MEDICINE | Facility: CLINIC | Age: 58
End: 2023-04-04
Payer: COMMERCIAL

## 2023-04-04 DIAGNOSIS — E66.01 SEVERE OBESITY (BMI 35.0-39.9) WITH COMORBIDITY: ICD-10-CM

## 2023-04-04 RX ORDER — SEMAGLUTIDE 1.7 MG/.75ML
1.7 INJECTION, SOLUTION SUBCUTANEOUS
Qty: 0.75 ML | Refills: 3 | Status: SHIPPED | OUTPATIENT
Start: 2023-04-04 | End: 2023-04-28 | Stop reason: SDUPTHER

## 2023-04-04 NOTE — TELEPHONE ENCOUNTER
Care Due:                  Date            Visit Type   Department     Provider  --------------------------------------------------------------------------------                                EP -                              PRIMARY      [facility] INTERNAL  Michelle MENDOZA  Last Visit: 10-      CARE ([facility])   MEDICINE       Kathia  Next Visit: None Scheduled  None         None Found                                                            Last  Test          Frequency    Reason                     Performed    Due Date  --------------------------------------------------------------------------------    HBA1C.......  6 months...  semaglutide,.............  Not Found    Overdue    [employer] Embedded Care Gaps. Reference number: [id number]. 4/04/2023   3:09:05 PM CDT

## 2023-04-13 DIAGNOSIS — N52.9 ERECTILE DYSFUNCTION, UNSPECIFIED ERECTILE DYSFUNCTION TYPE: ICD-10-CM

## 2023-04-13 RX ORDER — TADALAFIL 20 MG/1
20 TABLET ORAL DAILY
Qty: 30 TABLET | Refills: 11 | Status: CANCELLED | OUTPATIENT
Start: 2023-04-13 | End: 2024-04-12

## 2023-04-18 ENCOUNTER — PATIENT MESSAGE (OUTPATIENT)
Dept: UROLOGY | Facility: CLINIC | Age: 58
End: 2023-04-18
Payer: COMMERCIAL

## 2023-04-18 DIAGNOSIS — E29.1 HYPOGONADISM IN MALE: ICD-10-CM

## 2023-04-19 RX ORDER — TESTOSTERONE CYPIONATE 200 MG/ML
INJECTION, SOLUTION INTRAMUSCULAR
Qty: 5 ML | Refills: 1 | Status: SHIPPED | OUTPATIENT
Start: 2023-04-19 | End: 2023-06-17 | Stop reason: SDUPTHER

## 2023-04-26 ENCOUNTER — PATIENT MESSAGE (OUTPATIENT)
Dept: PULMONOLOGY | Facility: CLINIC | Age: 58
End: 2023-04-26
Payer: COMMERCIAL

## 2023-04-28 ENCOUNTER — PATIENT MESSAGE (OUTPATIENT)
Dept: FAMILY MEDICINE | Facility: CLINIC | Age: 58
End: 2023-04-28
Payer: COMMERCIAL

## 2023-04-28 DIAGNOSIS — E66.01 SEVERE OBESITY (BMI 35.0-35.9 WITH COMORBIDITY): Primary | ICD-10-CM

## 2023-04-28 DIAGNOSIS — E66.01 SEVERE OBESITY (BMI 35.0-39.9) WITH COMORBIDITY: ICD-10-CM

## 2023-04-28 RX ORDER — SEMAGLUTIDE 1.7 MG/.75ML
1.7 INJECTION, SOLUTION SUBCUTANEOUS
Qty: 0.75 ML | Refills: 3 | Status: SHIPPED | OUTPATIENT
Start: 2023-04-28 | End: 2023-05-01

## 2023-04-28 NOTE — TELEPHONE ENCOUNTER
No care due was identified.  [facility] Embedded Care Due Messages. Reference number: [id number].   4/28/2023 11:29:34 AM CDT

## 2023-05-01 ENCOUNTER — PATIENT MESSAGE (OUTPATIENT)
Dept: UROLOGY | Facility: CLINIC | Age: 58
End: 2023-05-01
Payer: COMMERCIAL

## 2023-05-01 ENCOUNTER — PATIENT MESSAGE (OUTPATIENT)
Dept: FAMILY MEDICINE | Facility: CLINIC | Age: 58
End: 2023-05-01
Payer: COMMERCIAL

## 2023-05-01 RX ORDER — SEMAGLUTIDE 2.4 MG/.75ML
2.4 INJECTION, SOLUTION SUBCUTANEOUS
Qty: 4 EACH | Refills: 1 | Status: SHIPPED | OUTPATIENT
Start: 2023-05-01 | End: 2023-06-30 | Stop reason: SDUPTHER

## 2023-05-08 ENCOUNTER — TELEPHONE (OUTPATIENT)
Dept: UROLOGY | Facility: CLINIC | Age: 58
End: 2023-05-08
Payer: COMMERCIAL

## 2023-05-08 NOTE — TELEPHONE ENCOUNTER
Called to speak with pt but no answer however the prior authorization has been filled out were just waiting on the insurance company.  ----- Message from Nydia Dennis sent at 5/8/2023 11:15 AM CDT -----  Contact: [phone]  Patient is requesting a call back regarding an appeal not being sent to his insurance for the denied PA that was sent for him. Please call him back as soon as possible at [phone]. Thanks

## 2023-05-11 ENCOUNTER — TELEPHONE (OUTPATIENT)
Dept: UROLOGY | Facility: CLINIC | Age: 58
End: 2023-05-11
Payer: COMMERCIAL

## 2023-05-11 ENCOUNTER — PATIENT MESSAGE (OUTPATIENT)
Dept: PULMONOLOGY | Facility: CLINIC | Age: 58
End: 2023-05-11
Payer: COMMERCIAL

## 2023-05-11 NOTE — TELEPHONE ENCOUNTER
Message given to Cynthia, she is working on his PA       ----- Message from Noreen Ellington sent at 5/11/2023  2:54 PM CDT -----  Contact: Levy kline that an electronic PA has been set up through [employer] for his testosterone renewal. The key is [id number]. It needs be notes what the number for the testosterone level was. Please call with any questions at [phone].

## 2023-05-18 ENCOUNTER — TELEPHONE (OUTPATIENT)
Dept: UROLOGY | Facility: CLINIC | Age: 58
End: 2023-05-18
Payer: COMMERCIAL

## 2023-05-18 NOTE — TELEPHONE ENCOUNTER
PA has been initiated in PA in basket.      ----- Message from Bienvenido King sent at 5/18/2023  3:26 PM CDT -----  Contact: Ann/Julianna Juarez is needing a call back in regards to a PA that is needed for the patient's DEPOTESTOTERONE CYPIONATE medication. Please give Prior Auth dept a call back at [phone]

## 2023-05-19 ENCOUNTER — TELEPHONE (OUTPATIENT)
Dept: UROLOGY | Facility: CLINIC | Age: 58
End: 2023-05-19
Payer: COMMERCIAL

## 2023-05-19 ENCOUNTER — PATIENT MESSAGE (OUTPATIENT)
Dept: UROLOGY | Facility: CLINIC | Age: 58
End: 2023-05-19
Payer: COMMERCIAL

## 2023-05-19 NOTE — TELEPHONE ENCOUNTER
Message sent to the patient explaining him that we worked in his PA always and still we didn't get any approval from the insurance. I gave him option in how to get the medicine for less money through the [employer] card.     Loraine Milanes RN     ----- Message from Greg King sent at 5/19/2023  8:30 AM CDT -----  Contact: self [phone]  Pt is calling requesting PA for medication,testosterone cypionate (DEPOTESTOTERONE CYPIONATE) 200 mg/mL injection, [id number], pt states he have been trying to get med approved for 3 months . Please call back at [phone] . Thanks/dj

## 2023-06-05 ENCOUNTER — PATIENT MESSAGE (OUTPATIENT)
Dept: UROLOGY | Facility: CLINIC | Age: 58
End: 2023-06-05
Payer: COMMERCIAL

## 2023-06-17 DIAGNOSIS — E29.1 HYPOGONADISM IN MALE: ICD-10-CM

## 2023-06-17 RX ORDER — TESTOSTERONE CYPIONATE 200 MG/ML
200 INJECTION, SOLUTION INTRAMUSCULAR
Qty: 5 ML | Refills: 2 | Status: SHIPPED | OUTPATIENT
Start: 2023-06-17 | End: 2024-02-15 | Stop reason: SDUPTHER

## 2023-06-19 ENCOUNTER — TELEPHONE (OUTPATIENT)
Dept: UROLOGY | Facility: CLINIC | Age: 58
End: 2023-06-19
Payer: COMMERCIAL

## 2023-06-19 NOTE — TELEPHONE ENCOUNTER
----- Message from Kelechi Cantu MA sent at 6/19/2023  8:09 AM CDT -----  Contact: [phone]  Kurt ([facility] pharmacy) called              In regards to speak with staff or provider to clarifying instructions for the (testosterone cypionate (DEPOTESTOTERONE CYPIONATE) 200 mg/mL injection).              Call back [phone]

## 2023-06-19 NOTE — TELEPHONE ENCOUNTER
Spoke with pt he is aware of the change of frequency to every 14 days.    Spoke with the pharmacist and order clarified.    Corrie CONLEY RN

## 2023-06-30 ENCOUNTER — PATIENT MESSAGE (OUTPATIENT)
Dept: FAMILY MEDICINE | Facility: CLINIC | Age: 58
End: 2023-06-30
Payer: COMMERCIAL

## 2023-06-30 DIAGNOSIS — E66.01 SEVERE OBESITY (BMI 35.0-35.9 WITH COMORBIDITY): ICD-10-CM

## 2023-06-30 RX ORDER — SEMAGLUTIDE 2.4 MG/.75ML
2.4 INJECTION, SOLUTION SUBCUTANEOUS
Qty: 4 EACH | Refills: 1 | Status: SHIPPED | OUTPATIENT
Start: 2023-06-30 | End: 2023-08-04 | Stop reason: SDUPTHER

## 2023-06-30 NOTE — TELEPHONE ENCOUNTER
No care due was identified.  [facility] Embedded Care Due Messages. Reference number: [id number].   6/30/2023 9:01:26 AM CDT

## 2023-07-03 DIAGNOSIS — N52.9 ERECTILE DYSFUNCTION, UNSPECIFIED ERECTILE DYSFUNCTION TYPE: ICD-10-CM

## 2023-07-05 RX ORDER — TADALAFIL 20 MG/1
20 TABLET ORAL DAILY
Qty: 30 TABLET | Refills: 0 | Status: SHIPPED | OUTPATIENT
Start: 2023-07-05 | End: 2023-08-04 | Stop reason: SDUPTHER

## 2023-08-04 DIAGNOSIS — N52.9 ERECTILE DYSFUNCTION, UNSPECIFIED ERECTILE DYSFUNCTION TYPE: ICD-10-CM

## 2023-08-09 ENCOUNTER — PATIENT MESSAGE (OUTPATIENT)
Dept: UROLOGY | Facility: CLINIC | Age: 58
End: 2023-08-09
Payer: COMMERCIAL

## 2023-08-09 RX ORDER — TADALAFIL 20 MG/1
20 TABLET ORAL DAILY
Qty: 90 TABLET | Refills: 3 | Status: SHIPPED | OUTPATIENT
Start: 2023-08-09 | End: 2024-08-08

## 2023-10-20 ENCOUNTER — PATIENT MESSAGE (OUTPATIENT)
Dept: FAMILY MEDICINE | Facility: CLINIC | Age: 58
End: 2023-10-20
Payer: COMMERCIAL

## 2023-10-20 DIAGNOSIS — E66.01 SEVERE OBESITY (BMI 35.0-35.9 WITH COMORBIDITY): ICD-10-CM

## 2023-10-20 RX ORDER — SEMAGLUTIDE 2.4 MG/.75ML
2.4 INJECTION, SOLUTION SUBCUTANEOUS
Qty: 4 EACH | Refills: 0 | Status: SHIPPED | OUTPATIENT
Start: 2023-10-20 | End: 2023-11-02 | Stop reason: SDUPTHER

## 2023-10-20 NOTE — TELEPHONE ENCOUNTER
No care due was identified.  [facility] Embedded Care Due Messages. Reference number: [id number].   10/20/2023 8:51:06 AM CDT

## 2023-11-02 ENCOUNTER — PATIENT MESSAGE (OUTPATIENT)
Dept: FAMILY MEDICINE | Facility: CLINIC | Age: 58
End: 2023-11-02

## 2023-11-02 ENCOUNTER — LAB VISIT (OUTPATIENT)
Dept: LAB | Facility: HOSPITAL | Age: 58
End: 2023-11-02
Attending: FAMILY MEDICINE
Payer: COMMERCIAL

## 2023-11-02 ENCOUNTER — OFFICE VISIT (OUTPATIENT)
Dept: FAMILY MEDICINE | Facility: CLINIC | Age: 58
End: 2023-11-02
Attending: FAMILY MEDICINE
Payer: COMMERCIAL

## 2023-11-02 VITALS
DIASTOLIC BLOOD PRESSURE: 80 MMHG | HEIGHT: 72 IN | BODY MASS INDEX: 39.02 KG/M2 | WEIGHT: 288.13 LBS | OXYGEN SATURATION: 95 % | TEMPERATURE: 99 F | HEART RATE: 87 BPM | SYSTOLIC BLOOD PRESSURE: 126 MMHG

## 2023-11-02 DIAGNOSIS — Z12.5 PROSTATE CANCER SCREENING: ICD-10-CM

## 2023-11-02 DIAGNOSIS — R73.03 PRE-DIABETES: ICD-10-CM

## 2023-11-02 DIAGNOSIS — E66.01 SEVERE OBESITY (BMI 35.0-35.9 WITH COMORBIDITY): ICD-10-CM

## 2023-11-02 DIAGNOSIS — R73.03 PRE-DIABETES: Primary | ICD-10-CM

## 2023-11-02 DIAGNOSIS — G47.33 OSA (OBSTRUCTIVE SLEEP APNEA): ICD-10-CM

## 2023-11-02 LAB
ALBUMIN SERPL BCP-MCNC: 3.8 G/DL (ref 3.5–5.2)
ALP SERPL-CCNC: 74 U/L (ref 55–135)
ALT SERPL W/O P-5'-P-CCNC: 17 U/L (ref 10–44)
ANION GAP SERPL CALC-SCNC: 16 MMOL/L (ref 8–16)
AST SERPL-CCNC: 17 U/L (ref 10–40)
BASOPHILS # BLD AUTO: 0.05 K/UL (ref 0–0.2)
BASOPHILS NFR BLD: 0.5 % (ref 0–1.9)
BILIRUB SERPL-MCNC: 0.3 MG/DL (ref 0.1–1)
BUN SERPL-MCNC: 17 MG/DL (ref 6–20)
CALCIUM SERPL-MCNC: 9.2 MG/DL (ref 8.7–10.5)
CHLORIDE SERPL-SCNC: 103 MMOL/L (ref 95–110)
CHOLEST SERPL-MCNC: 175 MG/DL (ref 120–199)
CHOLEST/HDLC SERPL: 4.5 {RATIO} (ref 2–5)
CO2 SERPL-SCNC: 23 MMOL/L (ref 23–29)
COMPLEXED PSA SERPL-MCNC: 3.5 NG/ML (ref 0–4)
CREAT SERPL-MCNC: 1.1 MG/DL (ref 0.5–1.4)
DIFFERENTIAL METHOD: ABNORMAL
EOSINOPHIL # BLD AUTO: 0 K/UL (ref 0–0.5)
EOSINOPHIL NFR BLD: 0.4 % (ref 0–8)
ERYTHROCYTE [DISTWIDTH] IN BLOOD BY AUTOMATED COUNT: 15.5 % (ref 11.5–14.5)
EST. GFR  (NO RACE VARIABLE): >60 ML/MIN/1.73 M^2
ESTIMATED AVG GLUCOSE: 117 MG/DL (ref 68–131)
GLUCOSE SERPL-MCNC: 75 MG/DL (ref 70–110)
HBA1C MFR BLD: 5.7 % (ref 4–5.6)
HCT VFR BLD AUTO: 50.8 % (ref 40–54)
HDLC SERPL-MCNC: 39 MG/DL (ref 40–75)
HDLC SERPL: 22.3 % (ref 20–50)
HGB BLD-MCNC: 15.7 G/DL (ref 14–18)
IMM GRANULOCYTES # BLD AUTO: 0.06 K/UL (ref 0–0.04)
IMM GRANULOCYTES NFR BLD AUTO: 0.6 % (ref 0–0.5)
LDLC SERPL CALC-MCNC: 114.6 MG/DL (ref 63–159)
LYMPHOCYTES # BLD AUTO: 1.9 K/UL (ref 1–4.8)
LYMPHOCYTES NFR BLD: 17.9 % (ref 18–48)
MCH RBC QN AUTO: 26.8 PG (ref 27–31)
MCHC RBC AUTO-ENTMCNC: 30.9 G/DL (ref 32–36)
MCV RBC AUTO: 87 FL (ref 82–98)
MONOCYTES # BLD AUTO: 1 K/UL (ref 0.3–1)
MONOCYTES NFR BLD: 9.5 % (ref 4–15)
NEUTROPHILS # BLD AUTO: 7.7 K/UL (ref 1.8–7.7)
NEUTROPHILS NFR BLD: 71.1 % (ref 38–73)
NONHDLC SERPL-MCNC: 136 MG/DL
NRBC BLD-RTO: 0 /100 WBC
PLATELET # BLD AUTO: 309 K/UL (ref 150–450)
PMV BLD AUTO: 11.5 FL (ref 9.2–12.9)
POTASSIUM SERPL-SCNC: 4 MMOL/L (ref 3.5–5.1)
PROT SERPL-MCNC: 6.5 G/DL (ref 6–8.4)
RBC # BLD AUTO: 5.85 M/UL (ref 4.6–6.2)
SODIUM SERPL-SCNC: 142 MMOL/L (ref 136–145)
TRIGL SERPL-MCNC: 107 MG/DL (ref 30–150)
WBC # BLD AUTO: 10.81 K/UL (ref 3.9–12.7)

## 2023-11-02 PROCEDURE — 36415 COLL VENOUS BLD VENIPUNCTURE: CPT | Mod: PO | Performed by: FAMILY MEDICINE

## 2023-11-02 PROCEDURE — 99999 PR PBB SHADOW E&M-EST. PATIENT-LVL IV: ICD-10-PCS | Mod: PBBFAC,,, | Performed by: FAMILY MEDICINE

## 2023-11-02 PROCEDURE — 80061 LIPID PANEL: CPT | Performed by: FAMILY MEDICINE

## 2023-11-02 PROCEDURE — 99214 PR OFFICE/OUTPT VISIT, EST, LEVL IV, 30-39 MIN: ICD-10-PCS | Mod: S$GLB,,, | Performed by: FAMILY MEDICINE

## 2023-11-02 PROCEDURE — 84153 ASSAY OF PSA TOTAL: CPT | Performed by: FAMILY MEDICINE

## 2023-11-02 PROCEDURE — 85025 COMPLETE CBC W/AUTO DIFF WBC: CPT | Performed by: FAMILY MEDICINE

## 2023-11-02 PROCEDURE — 80053 COMPREHEN METABOLIC PANEL: CPT | Performed by: FAMILY MEDICINE

## 2023-11-02 PROCEDURE — 99999 PR PBB SHADOW E&M-EST. PATIENT-LVL IV: CPT | Mod: PBBFAC,,, | Performed by: FAMILY MEDICINE

## 2023-11-02 PROCEDURE — 83036 HEMOGLOBIN GLYCOSYLATED A1C: CPT | Performed by: FAMILY MEDICINE

## 2023-11-02 PROCEDURE — 99214 OFFICE O/P EST MOD 30 MIN: CPT | Mod: S$GLB,,, | Performed by: FAMILY MEDICINE

## 2023-11-02 RX ORDER — SEMAGLUTIDE 2.4 MG/.75ML
2.4 INJECTION, SOLUTION SUBCUTANEOUS
Qty: 4 EACH | Refills: 6 | Status: SHIPPED | OUTPATIENT
Start: 2023-11-02 | End: 2023-11-02 | Stop reason: SDUPTHER

## 2023-11-02 RX ORDER — SEMAGLUTIDE 2.4 MG/.75ML
2.4 INJECTION, SOLUTION SUBCUTANEOUS
Qty: 4 EACH | Refills: 6 | Status: SHIPPED | OUTPATIENT
Start: 2023-11-02

## 2023-11-02 NOTE — PROGRESS NOTES
Subjective:       Patient ID: Levy Mckoy is a 58 y.o. male.    Chief Complaint: Follow-up    58 y old male here for f/u . Doing well on wegovy . He has lost 18 lbs since starting it . Not exercising . Compliant with CPAP .      Review of Systems   Constitutional: Negative.    HENT: Negative.     Eyes: Negative.    Respiratory: Negative.     Cardiovascular: Negative.    Gastrointestinal: Negative.    Genitourinary: Negative.    Musculoskeletal: Negative.    Skin: Negative.    Hematological: Negative.        Objective:      Physical Exam  Constitutional:       General: He is not in acute distress.     Appearance: He is well-developed. He is not diaphoretic.   HENT:      Head: Normocephalic and atraumatic.      Right Ear: External ear normal.      Left Ear: External ear normal.      Nose: Nose normal.      Mouth/Throat:      Pharynx: No oropharyngeal exudate.   Eyes:      General: No scleral icterus.        Right eye: No discharge.         Left eye: No discharge.      Conjunctiva/sclera: Conjunctivae normal.      Pupils: Pupils are equal, round, and reactive to light.   Neck:      Thyroid: No thyromegaly.      Vascular: No JVD.      Trachea: No tracheal deviation.   Cardiovascular:      Rate and Rhythm: Normal rate and regular rhythm.      Heart sounds: Normal heart sounds. No murmur heard.     No friction rub. No gallop.   Pulmonary:      Effort: Pulmonary effort is normal. No respiratory distress.      Breath sounds: Normal breath sounds. No stridor. No wheezing or rales.   Chest:      Chest wall: No tenderness.   Abdominal:      General: Bowel sounds are normal. There is no distension.      Palpations: Abdomen is soft.      Tenderness: There is no abdominal tenderness. There is no guarding or rebound.   Musculoskeletal:         General: No tenderness. Normal range of motion.      Cervical back: Normal range of motion and neck supple.   Lymphadenopathy:      Cervical: No cervical adenopathy.   Skin:      General: Skin is warm and dry.      Coloration: Skin is not pale.      Findings: No erythema or rash.   Neurological:      Mental Status: He is alert and oriented to person, place, and time.      Cranial Nerves: No cranial nerve deficit.      Motor: No abnormal muscle tone.      Coordination: Coordination normal.      Deep Tendon Reflexes: Reflexes are normal and symmetric. Reflexes normal.   Psychiatric:         Behavior: Behavior normal.         Thought Content: Thought content normal.         Judgment: Judgment normal.         Assessment:         Levy was seen today for follow-up.    Diagnoses and all orders for this visit:    Pre-diabetes  -     CBC Auto Differential; Future  -     Comprehensive Metabolic Panel; Future  -     Hemoglobin A1C; Future  -     Lipid Panel; Future    Severe obesity (BMI 35.0-35.9 with comorbidity)  -     semaglutide, weight loss, (WEGOVY) 2.4 mg/0.75 mL PnIj; Inject 2.4 mg into the skin every 7 days.    Prostate cancer screening  -     PROSTATE SPECIFIC ANTIGEN, DIAGNOSTIC; Future    JULI (obstructive sleep apnea)    Other orders  -     Cancel: X-Ray Lumbar Spine Ap And Lateral; Future       Plan:     Levy was seen today for follow-up.    Diagnoses and all orders for this visit:    Pre-diabetes  -     CBC Auto Differential; Future  -     Comprehensive Metabolic Panel; Future  -     Hemoglobin A1C; Future  -     Lipid Panel; Future    Severe obesity (BMI 35.0-35.9 with comorbidity)  -     semaglutide, weight loss, (WEGOVY) 2.4 mg/0.75 mL PnIj; Inject 2.4 mg into the skin every 7 days.    Prostate cancer screening  -     PROSTATE SPECIFIC ANTIGEN, DIAGNOSTIC; Future    Lumbar back pain  -     Cancel: X-Ray Lumbar Spine Ap And Lateral; Future     Labs  Start exercising .   PSA   CPAP

## 2023-11-03 DIAGNOSIS — R79.89 ABNORMAL CBC: Primary | ICD-10-CM

## 2023-11-03 RX ORDER — CYCLOBENZAPRINE HCL 10 MG
10 TABLET ORAL NIGHTLY
Qty: 10 TABLET | Refills: 0 | Status: SHIPPED | OUTPATIENT
Start: 2023-11-03 | End: 2023-11-13

## 2024-02-02 ENCOUNTER — TELEPHONE (OUTPATIENT)
Dept: FAMILY MEDICINE | Facility: CLINIC | Age: 59
End: 2024-02-02
Payer: COMMERCIAL

## 2024-02-02 NOTE — TELEPHONE ENCOUNTER
----- Message from Iesha Bragg sent at 2/2/2024  3:58 PM CST -----  Contact: Levy  Mihai is calling in regards to going [facility] and was diagnosed with flu and prescribed cough syrup and wanted to know if doctor can refill it due to cough returning. Please call back at [phone]              [facility] [address] [place] [address] [address] [place] [address]  [address] [address] [address]  Phone: [phone] Fax: [phone]  Hours: Not open 24 hours                    Thanks  KT

## 2024-02-02 NOTE — TELEPHONE ENCOUNTER
Spoke with patient regarding testing positive for the flu two weeks ago. Pt stats he still has an cough and he would like an refill on the cough syrup he was prescribed. Informed the pt. That he would need to schedule an appointment to be seen by Dr. Bertrand for evaluation. Pt. Decline appointment. I informed the patient if symptom worsen please go to the nearest Urgent Care.

## 2024-02-02 NOTE — TELEPHONE ENCOUNTER
----- Message from Dianna Dennis sent at 2/2/2024  4:42 PM CST -----  Contact: Levy  Type:  Patient Returning Call    Who Called:Levy  Who Left Message for Patient:Nurse  Does the patient know what this is regarding?:missed call  Would the patient rather a call back or a response via [employer]? Call back  Best Call Back Number:     Additional Information: Levy missed a call and would like a call back    Thanks  LJ

## 2024-02-15 DIAGNOSIS — E29.1 HYPOGONADISM IN MALE: ICD-10-CM

## 2024-02-16 NOTE — TELEPHONE ENCOUNTER
Message sent to provider for a refill. Waiting for approval      ----- Message from Frederick Bowman sent at 2/16/2024  2:49 PM CST -----  Contact: Patient  Patient is calling to check on the status of the refill request for testosterone cypionate (DEPOTESTOTERONE CYPIONATE) 200 mg/mL injection . Please call patient at .[phone]

## 2024-02-19 ENCOUNTER — TELEPHONE (OUTPATIENT)
Dept: UROLOGY | Facility: CLINIC | Age: 59
End: 2024-02-19
Payer: COMMERCIAL

## 2024-02-19 NOTE — TELEPHONE ENCOUNTER
Called the patient, after verification of name and , I apologized to the patient for no one getting back to him sooner. I explained to him that he has not been seen in clinic since  and he will need to be seen by Dr Rao before he can call in any medication for him. I explained to the pt that he will also need to get blood work done prior to his appt. Pt also informed that I will speak to Dr rao in clinic tomorrow and find out which labs is needed and I will give a call back around lunch time to get everything scheduled with him. Pt voiced understanding         ----- Message from Frederick Bowman sent at 2024  2:55 PM CST -----  Contact: Patient  4th request      Patient is calling regarding his script for his testosterone cypionate (DEPOTESTOTERONE CYPIONATE) 200 mg/mL injection. Please call patient at .[phone]       Patient has requested to escalate.

## 2024-02-20 DIAGNOSIS — E29.1 HYPOGONADISM IN MALE: Primary | ICD-10-CM

## 2024-02-20 RX ORDER — TESTOSTERONE CYPIONATE 200 MG/ML
200 INJECTION, SOLUTION INTRAMUSCULAR
Qty: 5 ML | Refills: 2 | Status: SHIPPED | OUTPATIENT
Start: 2024-02-20 | End: 2024-03-12 | Stop reason: SDUPTHER

## 2024-02-22 ENCOUNTER — PATIENT MESSAGE (OUTPATIENT)
Dept: UROLOGY | Facility: CLINIC | Age: 59
End: 2024-02-22
Payer: COMMERCIAL

## 2024-02-27 ENCOUNTER — TELEPHONE (OUTPATIENT)
Dept: UROLOGY | Facility: CLINIC | Age: 59
End: 2024-02-27
Payer: COMMERCIAL

## 2024-02-27 NOTE — TELEPHONE ENCOUNTER
----- Message from Juvenal Castro sent at 2/23/2024  3:36 PM CST -----  Contact: self  Pt is asking for an return call in reference to needing orders sent over to [facility] to have labs done ,please call back at .[phone] Thx CJ

## 2024-02-27 NOTE — TELEPHONE ENCOUNTER
Orders faxed again. Message sent to pt on portal       ----- Message from Golden Brown sent at 2/23/2024  2:45 PM CST -----  Contact: guzman Franco is calling in regards to check the status of orders being sent to [facility]. Please call him back at [phone] . (Fax number in previous message)          Thanks  DD

## 2024-03-12 ENCOUNTER — OFFICE VISIT (OUTPATIENT)
Dept: UROLOGY | Facility: CLINIC | Age: 59
End: 2024-03-12
Payer: COMMERCIAL

## 2024-03-12 VITALS
SYSTOLIC BLOOD PRESSURE: 131 MMHG | HEART RATE: 84 BPM | DIASTOLIC BLOOD PRESSURE: 80 MMHG | WEIGHT: 288 LBS | BODY MASS INDEX: 39.06 KG/M2

## 2024-03-12 DIAGNOSIS — E29.1 HYPOGONADISM IN MALE: ICD-10-CM

## 2024-03-12 PROCEDURE — 99214 OFFICE O/P EST MOD 30 MIN: CPT | Mod: S$GLB,,, | Performed by: UROLOGY

## 2024-03-12 PROCEDURE — 99999 PR PBB SHADOW E&M-EST. PATIENT-LVL III: CPT | Mod: PBBFAC,,, | Performed by: UROLOGY

## 2024-03-12 RX ORDER — TESTOSTERONE CYPIONATE 200 MG/ML
200 INJECTION, SOLUTION INTRAMUSCULAR
Qty: 5 ML | Refills: 2 | Status: SHIPPED | OUTPATIENT
Start: 2024-03-12

## 2024-03-12 NOTE — PROGRESS NOTES
Chief Complaint:  Low testosterone    HPI:   03/12/2024 - returns today for follow-up, no new issues in the interim, testosterone working well but has been off of it for about six weeks, no new voiding issues, ED stable, sometimes Cialis works, sometimes it does not, had labs late last month which were appropriate    04/19/2022 - returns today for follow-up, testosterone going well, viagra working but notes issues with timing restrictions, wants to try cialis, due for labs    09/21/2021 - patient returns today for follow-up, he has been taking the Clomid as prescribed for about a month and his testosterone level increased to 555, he notes some improvement in his symptoms but not significant, is interested in proceeding with testosterone injections    08/04/2021 - 59 y.o. male that presents for low testosterone. Patient notes about a 1 year history of a fatigue, weight gain, low energy, and ED.  he was talking with 1 of his friends who noted he should get his testosterone checked.  Per patient's report, testosterone level was around 100, his outside lab work from a [facility] does not have this listed.  PSA 2.3, serum creatinine normal.  Patient denies any voiding difficulty, denies gross hematuria, denies a prior urologic procedures, denies family history of  cancers.    PMH:  Past Medical History:   Diagnosis Date    BPH (benign prostatic hyperplasia)     Sleep apnea, unspecified        PSH:  Past Surgical History:   Procedure Laterality Date    APPENDECTOMY      CYST REMOVAL      right ankle         Family History:  Family History   Problem Relation Age of Onset    Cancer Mother         Breast    Diabetes Father     Coronary artery disease Father 72       Social History:  Social History     Tobacco Use    Smoking status: Never    Smokeless tobacco: Never   Substance Use Topics    Alcohol use: Yes     Comment: very rare    Drug use: No        Review of Systems:  General: No fever, chills  Skin: No  rashes  Chest:  Denies cough and sputum production  Heart: Denies chest pain  Resp: Denies dyspnea  Abdomen: Denies diarrhea, abdominal pain, hematemesis, or blood in stool.  Musculoskeletal: No joint stiffness or swelling. Denies back pain.  : see HPI  Neuro: no dizziness or weakness    Allergies:  Patient has no known allergies.    Medications:    Current Outpatient Medications:     semaglutide, weight loss, (WEGOVY) 2.4 mg/0.75 mL PnIj, Inject 2.4 mg into the skin every 7 days., Disp: 4 each, Rfl: 6    tadalafiL (CIALIS) 20 MG Tab, Take 1 tablet (20 mg total) by mouth once daily., Disp: 90 tablet, Rfl: 3    testosterone cypionate (DEPOTESTOTERONE CYPIONATE) 200 mg/mL injection, Inject 1 mL (200 mg total) into the muscle every 14 (fourteen) days. INJECT 1ML INTO THE MUSCLE EVERY 21 DAYS, Disp: 5 mL, Rfl: 2    Physical Exam:  Vitals:    03/12/24 1306   BP: 131/80   Pulse: 84     Body mass index is 39.06 kg/m².  General: awake, alert, cooperative  Head: NC/AT  Ears: external ears normal  Eyes: sclera normal  Lungs: normal inspiration, NAD  Heart: well-perfused  Abdomen: Soft, NT, ND   3/24: Normal circ'd phallus, meatus normal in size and position, BL testicles palpable, no masses, nontender, no abnormalities of epididymi, left-sided varicocele  IBAN 08/21: Normal rectal tone, no hemorrhoids. Prostate smooth and normal, no nodules 30 gm SV not palpable. Perineum and anus normal.  Skin: The skin is warm and dry  Ext: No c/c/e.  Neuro: grossly intact, AOx3    RADIOLOGY:  No recent relevant imaging available for review.    LABS:  I personally reviewed the following lab values:  Lab Results   Component Value Date    WBC 10.81 11/02/2023    HGB 15.7 11/02/2023    HCT 50.8 11/02/2023     11/02/2023     11/02/2023    K 4.0 11/02/2023     11/02/2023    CREATININE 1.1 11/02/2023    BUN 17 11/02/2023    CO2 23 11/02/2023    TSH 1.42 06/01/2011    HGBA1C 5.7 (H) 11/02/2023    CHOL 175 11/02/2023    TRIG  107 11/02/2023    HDL 39 (L) 11/02/2023    LDLDIRECT 144 (H) 02/14/2020    ALT 17 11/02/2023    AST 17 11/02/2023     Assessment/Plan:   Levy Mckoy is a 59 y.o. male with:    Low testosterone - continue T    ED - continue cialis, can take up to 40mg    Prostate Cancer Screening - PSA and IBAN normal, continue annual screening     - F/u 6 months    Adis Armando MD  Urology

## 2024-03-22 ENCOUNTER — OFFICE VISIT (OUTPATIENT)
Dept: FAMILY MEDICINE | Facility: CLINIC | Age: 59
End: 2024-03-22
Payer: COMMERCIAL

## 2024-03-22 ENCOUNTER — TELEPHONE (OUTPATIENT)
Dept: FAMILY MEDICINE | Facility: CLINIC | Age: 59
End: 2024-03-22
Payer: COMMERCIAL

## 2024-03-22 VITALS
SYSTOLIC BLOOD PRESSURE: 130 MMHG | HEIGHT: 72 IN | TEMPERATURE: 99 F | WEIGHT: 284.38 LBS | DIASTOLIC BLOOD PRESSURE: 80 MMHG | HEART RATE: 93 BPM | OXYGEN SATURATION: 96 % | BODY MASS INDEX: 38.52 KG/M2

## 2024-03-22 DIAGNOSIS — L72.0 EPIDERMOID CYST OF HAND: Primary | ICD-10-CM

## 2024-03-22 DIAGNOSIS — M79.641 RIGHT HAND PAIN: Primary | ICD-10-CM

## 2024-03-22 DIAGNOSIS — L30.9 DERMATITIS: ICD-10-CM

## 2024-03-22 DIAGNOSIS — Z12.11 COLON CANCER SCREENING: ICD-10-CM

## 2024-03-22 PROCEDURE — 99999 PR PBB SHADOW E&M-EST. PATIENT-LVL III: CPT | Mod: PBBFAC,,, | Performed by: NURSE PRACTITIONER

## 2024-03-22 PROCEDURE — 82274 ASSAY TEST FOR BLOOD FECAL: CPT | Performed by: NURSE PRACTITIONER

## 2024-03-22 PROCEDURE — 99213 OFFICE O/P EST LOW 20 MIN: CPT | Mod: S$GLB,,, | Performed by: NURSE PRACTITIONER

## 2024-03-22 RX ORDER — TRIAMCINOLONE ACETONIDE 1 MG/G
OINTMENT TOPICAL 2 TIMES DAILY
Qty: 30 G | Refills: 0 | Status: SHIPPED | OUTPATIENT
Start: 2024-03-22

## 2024-03-22 NOTE — PROGRESS NOTES
"Subjective:       Patient ID: Levy Mckoy is a 59 y.o. male.    Chief Complaint: Hand Injury  Pt reports to clinic with chief complaint of cyst to right hand.  Present "for a while".  Attributed to falling years ago.  Tried draining area with needle without resolution.  Mild discomfort.      Past Medical History:   Diagnosis Date    BPH (benign prostatic hyperplasia)     Sleep apnea, unspecified       Current Outpatient Medications on File Prior to Visit   Medication Sig Dispense Refill    semaglutide, weight loss, (WEGOVY) 2.4 mg/0.75 mL PnIj Inject 2.4 mg into the skin every 7 days. 4 each 6    tadalafiL (CIALIS) 20 MG Tab Take 1 tablet (20 mg total) by mouth once daily. 90 tablet 3    testosterone cypionate (DEPOTESTOTERONE CYPIONATE) 200 mg/mL injection Inject 1 mL (200 mg total) into the muscle every 14 (fourteen) days. INJECT 1ML INTO THE MUSCLE EVERY 21 DAYS 5 mL 2     No current facility-administered medications on file prior to visit.      Hand Injury       Review of Systems   Constitutional: Negative.    HENT: Negative.     Respiratory: Negative.     Cardiovascular: Negative.    Gastrointestinal: Negative.    Genitourinary: Negative.    Musculoskeletal:  Positive for arthralgias.   Skin: Negative.    Neurological: Negative.    Psychiatric/Behavioral: Negative.         Objective:      Physical Exam  Vitals reviewed.   HENT:      Head: Normocephalic.      Right Ear: External ear normal.      Left Ear: External ear normal.   Eyes:      Extraocular Movements: Extraocular movements intact.   Cardiovascular:      Rate and Rhythm: Normal rate.   Pulmonary:      Effort: Pulmonary effort is normal. No respiratory distress.   Musculoskeletal:        Arms:       Cervical back: Normal range of motion.      Comments: Slightly raised scabbed area. Non tender to touch   Neurological:      Mental Status: He is alert and oriented to person, place, and time.   Psychiatric:         Behavior: Behavior normal.       "   Assessment:       1. Epidermoid cyst of hand    2. Colon cancer screening    3. Dermatitis        Plan:   Epidermoid cyst of hand  -     Ambulatory referral/consult to Orthopedics; Future; Expected date: 03/29/2024  Possibly a ganglion however area appears more superficial.  Pt interested in removal will refer  Colon cancer screening  -     Fecal Immunochemical Test (iFOBT); Future; Expected date: 03/22/2024    Dermatitis  -     triamcinolone acetonide 0.1% (KENALOG) 0.1 % ointment; Apply topically 2 (two) times daily.  Dispense: 30 g; Refill: 0      No follow-ups on file.

## 2024-03-26 ENCOUNTER — HOSPITAL ENCOUNTER (OUTPATIENT)
Dept: RADIOLOGY | Facility: HOSPITAL | Age: 59
Discharge: HOME | End: 2024-03-26
Attending: ORTHOPAEDIC SURGERY
Payer: COMMERCIAL

## 2024-03-26 ENCOUNTER — OFFICE VISIT (OUTPATIENT)
Dept: ORTHOPEDICS | Facility: CLINIC | Age: 59
End: 2024-03-26
Payer: COMMERCIAL

## 2024-03-26 VITALS — WEIGHT: 284.38 LBS | BODY MASS INDEX: 38.52 KG/M2 | HEIGHT: 72 IN

## 2024-03-26 DIAGNOSIS — L72.0 EPIDERMOID CYST OF HAND: ICD-10-CM

## 2024-03-26 DIAGNOSIS — L72.0 EPIDERMOID CYST OF HAND: Primary | ICD-10-CM

## 2024-03-26 DIAGNOSIS — M79.641 RIGHT HAND PAIN: ICD-10-CM

## 2024-03-26 DIAGNOSIS — Z01.818 PREOP TESTING: ICD-10-CM

## 2024-03-26 PROCEDURE — 73130 X-RAY EXAM OF HAND: CPT | Mod: 26,RT,, | Performed by: RADIOLOGY

## 2024-03-26 PROCEDURE — 99204 OFFICE O/P NEW MOD 45 MIN: CPT | Mod: S$GLB,,, | Performed by: ORTHOPAEDIC SURGERY

## 2024-03-26 PROCEDURE — 99999 PR PBB SHADOW E&M-EST. PATIENT-LVL III: CPT | Mod: PBBFAC,,, | Performed by: ORTHOPAEDIC SURGERY

## 2024-03-26 PROCEDURE — 73130 X-RAY EXAM OF HAND: CPT | Mod: TC,RT

## 2024-03-26 NOTE — H&P (VIEW-ONLY)
Subjective:     Patient ID: Levy Mckoy is a 59 y.o. male.    Chief Complaint: Swelling of the Right Hand      HPI:  The patient is a 59-year-old male who fell 8 years ago and sustained an abrasion to his right palm.  He has developed an epidermal inclusion cyst about the right volar palm just superficial to the transverse carpal ligament.  It is about 1 in in diameter.  He wishes to have it excised    Past Medical History:   Diagnosis Date    BPH (benign prostatic hyperplasia)     Sleep apnea, unspecified      Past Surgical History:   Procedure Laterality Date    APPENDECTOMY      CYST REMOVAL      right ankle       Family History   Problem Relation Age of Onset    Cancer Mother         Breast    Diabetes Father     Coronary artery disease Father 72     Social History     Socioeconomic History    Marital status:    Occupational History    Occupation: Site safety and health officer   Tobacco Use    Smoking status: Never     Passive exposure: Never    Smokeless tobacco: Never   Substance and Sexual Activity    Alcohol use: Yes     Comment: very rare    Drug use: No    Sexual activity: Yes     Partners: Female     Medication List with Changes/Refills   Current Medications    SEMAGLUTIDE, WEIGHT LOSS, (WEGOVY) 2.4 MG/0.75 ML PNIJ    Inject 2.4 mg into the skin every 7 days.    TADALAFIL (CIALIS) 20 MG TAB    Take 1 tablet (20 mg total) by mouth once daily.    TESTOSTERONE CYPIONATE (DEPOTESTOTERONE CYPIONATE) 200 MG/ML INJECTION    Inject 1 mL (200 mg total) into the muscle every 14 (fourteen) days. INJECT 1ML INTO THE MUSCLE EVERY 21 DAYS    TRIAMCINOLONE ACETONIDE 0.1% (KENALOG) 0.1 % OINTMENT    Apply topically 2 (two) times daily.     Review of patient's allergies indicates:  No Known Allergies  Review of Systems   Constitutional: Negative for malaise/fatigue.   HENT:  Negative for hearing loss.    Eyes:  Negative for double vision and visual disturbance.   Cardiovascular:  Negative for chest pain.    Respiratory:  Positive for sleep disturbances due to breathing. Negative for shortness of breath.    Endocrine: Negative for cold intolerance.   Hematologic/Lymphatic: Does not bruise/bleed easily.   Skin:  Negative for poor wound healing and suspicious lesions.   Musculoskeletal:  Negative for gout, joint pain and joint swelling.   Gastrointestinal:  Negative for nausea and vomiting.   Genitourinary:  Negative for dysuria.   Neurological:  Negative for numbness, paresthesias and sensory change.   Psychiatric/Behavioral:  Negative for depression, memory loss and substance abuse. The patient is not nervous/anxious.    Allergic/Immunologic: Negative for persistent infections.       Objective:   Body mass index is 38.57 kg/m².  There were no vitals filed for this visit.             General    Constitutional: He is oriented to person, place, and time. He appears well-developed and well-nourished. No distress.   HENT:   Head: Normocephalic.   Mouth/Throat: Oropharynx is clear and moist.   Eyes: EOM are normal.   Cardiovascular:  Normal rate.            Pulmonary/Chest: Effort normal.   Abdominal: Soft.   Neurological: He is alert and oriented to person, place, and time. No cranial nerve deficit.   Psychiatric: He has a normal mood and affect.             Right Hand/Wrist Exam     Inspection   Scars: Wrist - absent Hand -  absent  Effusion: Wrist - absent Hand -  absent    Tenderness   The patient is tender to palpation of the barrios area.    Other     Neuorologic Exam    Median Distribution: normal  Ulnar Distribution: normal  Radial Distribution: normal    Comments:  The patient has about a 1 in soft tissue mass volar right palm superficial to the transverse carpal ligament.          Vascular Exam       Capillary Refill  Right Hand: normal capillary refill          Relevant imaging results reviewed and interpreted by me, discussed with the patient and / or family today radiographs right hand showed osteoarthritic  change in multiple small joints  Assessment:     Encounter Diagnosis   Name Primary?    Epidermoid cyst of hand     Right palm    Plan:       The patient wishes to have this soft tissue mass excised right volar wrist.  Risk complications and alternatives were discussed including the risk of infection, anesthetic risk, injury to nerves and vessels, loss of motion, and possible need for additional surgeries were discussed.  He seems to understand and agree to that surgery.  All questions were answered.              Disclaimer: This note was prepared using a voice recognition system and is likely to have sound alike errors within the text.

## 2024-03-26 NOTE — PROGRESS NOTES
Subjective:     Patient ID: Levy Mckoy is a 59 y.o. male.    Chief Complaint: Swelling of the Right Hand      HPI:  The patient is a 59-year-old male who fell 8 years ago and sustained an abrasion to his right palm.  He has developed an epidermal inclusion cyst about the right volar palm just superficial to the transverse carpal ligament.  It is about 1 in in diameter.  He wishes to have it excised    Past Medical History:   Diagnosis Date    BPH (benign prostatic hyperplasia)     Sleep apnea, unspecified      Past Surgical History:   Procedure Laterality Date    APPENDECTOMY      CYST REMOVAL      right ankle       Family History   Problem Relation Age of Onset    Cancer Mother         Breast    Diabetes Father     Coronary artery disease Father 72     Social History     Socioeconomic History    Marital status:    Occupational History    Occupation: Site safety and health officer   Tobacco Use    Smoking status: Never     Passive exposure: Never    Smokeless tobacco: Never   Substance and Sexual Activity    Alcohol use: Yes     Comment: very rare    Drug use: No    Sexual activity: Yes     Partners: Female     Medication List with Changes/Refills   Current Medications    SEMAGLUTIDE, WEIGHT LOSS, (WEGOVY) 2.4 MG/0.75 ML PNIJ    Inject 2.4 mg into the skin every 7 days.    TADALAFIL (CIALIS) 20 MG TAB    Take 1 tablet (20 mg total) by mouth once daily.    TESTOSTERONE CYPIONATE (DEPOTESTOTERONE CYPIONATE) 200 MG/ML INJECTION    Inject 1 mL (200 mg total) into the muscle every 14 (fourteen) days. INJECT 1ML INTO THE MUSCLE EVERY 21 DAYS    TRIAMCINOLONE ACETONIDE 0.1% (KENALOG) 0.1 % OINTMENT    Apply topically 2 (two) times daily.     Review of patient's allergies indicates:  No Known Allergies  Review of Systems   Constitutional: Negative for malaise/fatigue.   HENT:  Negative for hearing loss.    Eyes:  Negative for double vision and visual disturbance.   Cardiovascular:  Negative for chest pain.    Respiratory:  Positive for sleep disturbances due to breathing. Negative for shortness of breath.    Endocrine: Negative for cold intolerance.   Hematologic/Lymphatic: Does not bruise/bleed easily.   Skin:  Negative for poor wound healing and suspicious lesions.   Musculoskeletal:  Negative for gout, joint pain and joint swelling.   Gastrointestinal:  Negative for nausea and vomiting.   Genitourinary:  Negative for dysuria.   Neurological:  Negative for numbness, paresthesias and sensory change.   Psychiatric/Behavioral:  Negative for depression, memory loss and substance abuse. The patient is not nervous/anxious.    Allergic/Immunologic: Negative for persistent infections.       Objective:   Body mass index is 38.57 kg/m².  There were no vitals filed for this visit.             General    Constitutional: He is oriented to person, place, and time. He appears well-developed and well-nourished. No distress.   HENT:   Head: Normocephalic.   Mouth/Throat: Oropharynx is clear and moist.   Eyes: EOM are normal.   Cardiovascular:  Normal rate.            Pulmonary/Chest: Effort normal.   Abdominal: Soft.   Neurological: He is alert and oriented to person, place, and time. No cranial nerve deficit.   Psychiatric: He has a normal mood and affect.             Right Hand/Wrist Exam     Inspection   Scars: Wrist - absent Hand -  absent  Effusion: Wrist - absent Hand -  absent    Tenderness   The patient is tender to palpation of the barrios area.    Other     Neuorologic Exam    Median Distribution: normal  Ulnar Distribution: normal  Radial Distribution: normal    Comments:  The patient has about a 1 in soft tissue mass volar right palm superficial to the transverse carpal ligament.          Vascular Exam       Capillary Refill  Right Hand: normal capillary refill          Relevant imaging results reviewed and interpreted by me, discussed with the patient and / or family today radiographs right hand showed osteoarthritic  change in multiple small joints  Assessment:     Encounter Diagnosis   Name Primary?    Epidermoid cyst of hand     Right palm    Plan:       The patient wishes to have this soft tissue mass excised right volar wrist.  Risk complications and alternatives were discussed including the risk of infection, anesthetic risk, injury to nerves and vessels, loss of motion, and possible need for additional surgeries were discussed.  He seems to understand and agree to that surgery.  All questions were answered.              Disclaimer: This note was prepared using a voice recognition system and is likely to have sound alike errors within the text.      Statement Selected

## 2024-03-28 ENCOUNTER — PATIENT MESSAGE (OUTPATIENT)
Dept: ORTHOPEDICS | Facility: CLINIC | Age: 59
End: 2024-03-28
Payer: COMMERCIAL

## 2024-04-01 ENCOUNTER — OFFICE VISIT (OUTPATIENT)
Dept: INTERNAL MEDICINE | Facility: CLINIC | Age: 59
End: 2024-04-01
Payer: COMMERCIAL

## 2024-04-01 ENCOUNTER — HOSPITAL ENCOUNTER (OUTPATIENT)
Dept: CARDIOLOGY | Facility: HOSPITAL | Age: 59
Discharge: HOME | End: 2024-04-01
Payer: COMMERCIAL

## 2024-04-01 VITALS
DIASTOLIC BLOOD PRESSURE: 87 MMHG | TEMPERATURE: 99 F | OXYGEN SATURATION: 93 % | HEART RATE: 103 BPM | SYSTOLIC BLOOD PRESSURE: 154 MMHG

## 2024-04-01 DIAGNOSIS — E29.1 HYPOGONADISM IN MALE: ICD-10-CM

## 2024-04-01 DIAGNOSIS — G47.33 OSA (OBSTRUCTIVE SLEEP APNEA): ICD-10-CM

## 2024-04-01 DIAGNOSIS — E66.01 CLASS 3 SEVERE OBESITY DUE TO EXCESS CALORIES WITH SERIOUS COMORBIDITY AND BODY MASS INDEX (BMI) OF 40.0 TO 44.9 IN ADULT: ICD-10-CM

## 2024-04-01 DIAGNOSIS — Z01.818 PREOP TESTING: ICD-10-CM

## 2024-04-01 DIAGNOSIS — R73.03 PRE-DIABETES: ICD-10-CM

## 2024-04-01 DIAGNOSIS — L72.0 EPIDERMAL INCLUSION CYST: Primary | ICD-10-CM

## 2024-04-01 LAB
OHS QRS DURATION: 86 MS
OHS QTC CALCULATION: 423 MS

## 2024-04-01 PROCEDURE — 99999 PR PBB SHADOW E&M-EST. PATIENT-LVL III: CPT | Mod: PBBFAC,,,

## 2024-04-01 PROCEDURE — 93010 ELECTROCARDIOGRAM REPORT: CPT | Mod: ,,, | Performed by: INTERNAL MEDICINE

## 2024-04-01 PROCEDURE — 93005 ELECTROCARDIOGRAM TRACING: CPT

## 2024-04-01 NOTE — ASSESSMENT & PLAN NOTE
The patient is scheduled for a excision of soft tissue mass of right wrist on 4/11/24 per Dr. Logan   Known risk factors for perioperative complications:     Diabetes mellitus  Obesity  JULI      Difficulty with intubation is not anticipated.    Cardiac Risk Estimation: Based on the Revised Cardiac Risk index, patient is a Class 1 risk with a 3.9% risk of a major cardiac event in a low risk procedure.    1.) Preoperative workup as follows: ECG, hemoglobin, hematocrit, electrolytes, creatinine, glucose.  2.) Change in medication regimen before surgery: discontinue Wegovy 7 days prior to surgery  3.) Prophylaxis for cardiac events with perioperative beta-blockers: not indicated.  4.) Invasive hemodynamic monitoring perioperatively: not indicated.  5.) Deep vein thrombosis prophylaxis postoperatively: regimen to be chosen by surgical team.  6.) Surveillance for postoperative MI with ECG immediately postoperatively and on postoperati ve days 1 and 2 AND troponin levels 24 hours postoperatively and on day 4 or hospital discharge (whichever comes first): not indicated.  7.) Current medications which may produce withdrawal symptoms if withheld perioperatively: none  8.) Other measures: Careful attention to perioperative glycemic control (Accucheck in preop and PACU).

## 2024-04-01 NOTE — DISCHARGE INSTRUCTIONS
Pre op instructions reviewed with patient during Clinic Visit with Provider, verbalized understanding.    To confirm, Surgery is scheduled on 4/11/24. We will call you late afternoon the business day prior to surgery with your arrival time. Your Surgery at [facility].   The address is [address] [place] [address].       *If you are running late or have questions the morning of surgery, please call the Pre-OP Department @ [phone].     PLEASE STOP TAKING WEGOVY 7 DAYS PRIOR TO SURGERY. DO NOT TAKE A DOSE AFTER     Bathing Instructions:    -Shower with anti-bacterial Soap (Hibiclens or Dial) the night before surgery and the morning of.   -Do not use Hibiclens on your face or genitals.   -Apply clean clothes after shower.  -Do not shave your face or body 3 days prior to surgery unless instructed otherwise by your Surgeon.    Only 1 adult allowed (over the age of 18) to accompany you and MUST STAY through the entire length of admission.     -Must have a ride home from a responsible adult that you know and trust.    -Medical Transport, Uber or Lyft can only be used if patient has a responsible adult to accompany them during ride home.  Pediatric patients are encouraged to have 2 adults accompany them to the surgery center.      ~Your ride MUST STAY the entire time until you are discharged~     You may be required to provide a urine sample prior to procedure;   Please ask  for a specimen cup if you need to use the restroom prior to being called into pre-op.     Please come to the main lobby and be prepared to show your photo ID and insurance card.       Nothing to eat or drink after midnight, unless you were instructed to take specific medications discussed with the Pre-admit Nurse.    Please call with any questions or concerns.      [phone]       Thanks.

## 2024-04-01 NOTE — PROGRESS NOTES
Preoperative History and Physical                                                             Hospital Medicine      Chief Complaint: Preoperative evaluation     History of Present Illness:      Levy Mckoy is a 59 y.o. male who presents to the office today for a preoperative consultation at the request of Dr. Logan who plans on performing excision of soft tissue mass of right wrist on 4/11/24.    Functional Status:      The patient is able to climb a flight of stairs. The patient is able to ambulate several blocks without difficulty. The patient's functional status is not affected by the surgical problem. The patient's functional status is not affected by shortness of breath, chest pain, dyspnea on exertion and fatigue.    MET score greater than 4    Past Medical History:      Past Medical History:   Diagnosis Date    BPH (benign prostatic hyperplasia)     Low testosterone     Pre-diabetes     Sleep apnea, unspecified         Past Surgical History:      Past Surgical History:   Procedure Laterality Date    APPENDECTOMY      CYST REMOVAL      right ankle Right         Social History:      Social History     Socioeconomic History    Marital status:    Occupational History    Occupation: Site safety and health officer   Tobacco Use    Smoking status: Never     Passive exposure: Never    Smokeless tobacco: Never   Substance and Sexual Activity    Alcohol use: Yes     Comment: very rare    Drug use: No    Sexual activity: Yes     Partners: Female        Family History:      Family History   Problem Relation Age of Onset    Cancer Mother         Breast    Heart disease Father     Diabetes Father     Coronary artery disease Father 72       Allergies:      Review of patient's allergies indicates:  No Known Allergies    Medications:      Current Outpatient Medications   Medication Sig    semaglutide, weight loss, (WEGOVY) 2.4 mg/0.75 mL PnIj Inject 2.4 mg into  the skin every 7 days.    tadalafiL (CIALIS) 20 MG Tab Take 1 tablet (20 mg total) by mouth once daily.    testosterone cypionate (DEPOTESTOTERONE CYPIONATE) 200 mg/mL injection Inject 1 mL (200 mg total) into the muscle every 14 (fourteen) days. INJECT 1ML INTO THE MUSCLE EVERY 21 DAYS    triamcinolone acetonide 0.1% (KENALOG) 0.1 % ointment Apply topically 2 (two) times daily.     No current facility-administered medications for this visit.       Vitals:      Vitals:    04/01/24 1347   BP: (!) 154/87   Pulse: 103   Temp: 99 °F (37.2 °C)       Review of Systems:        Constitutional: Negative for fever, chills, weight loss, malaise/fatigue and diaphoresis.   HENT: Negative for hearing loss, ear pain, nosebleeds, congestion, sore throat, neck pain, tinnitus and ear discharge.    Eyes: Negative for blurred vision, double vision, photophobia, pain, discharge and redness.   Respiratory: Negative for cough, hemoptysis, sputum production, shortness of breath, wheezing and stridor.    Cardiovascular: Negative for chest pain, palpitations, orthopnea, claudication, leg swelling and PND.   Gastrointestinal: Negative for heartburn, nausea, vomiting, abdominal pain, diarrhea, constipation, blood in stool and melena.   Genitourinary: Negative for dysuria, urgency, frequency, hematuria and flank pain.   Musculoskeletal: Negative for myalgias, back pain, joint pain and falls.   Skin: TTP to lesion to right palm of hand Negative for itching and rash.   Neurological: Negative for dizziness, tingling, tremors, sensory change, speech change, focal weakness, seizures, loss of consciousness, weakness and headaches.   Endo/Heme/Allergies: Negative for environmental allergies and polydipsia. Does not bruise/bleed easily.   Psychiatric/Behavioral: Negative for depression, suicidal ideas, hallucinations, memory loss and substance abuse. The patient is not nervous/anxious and does not have insomnia.    All 14 systems reviewed and negative  except as noted above.    Physical Exam:      Constitutional: Appears well-developed, well-nourished and in no acute distress.  Patient is oriented to person, place, and time.   Head: Normocephalic and atraumatic. Mucous membranes moist.  Neck: Neck supple no mass.   Cardiovascular: Normal rate and regular rhythm.  S1 S2 appreciated by ascultation.  Pulmonary/Chest: Effort normal and clear to auscultation bilaterally. No respiratory distress.   Abdomen: Soft. Non-tender and non-distended. Bowel sounds are normal.   Neurological: Patient is alert and oriented to person, place and time. Moves all extremities.  Skin: Warm and dry. No lesions. Small lesion to right palm- TTP   Extremities: No clubbing, cyanosis or edema.    Laboratory data:      Reviewed and noted in plan where applicable. Please see chart for full laboratory data.      Lab Results   Component Value Date    WBC 10.81 11/02/2023    HGB 15.7 11/02/2023    HCT 50.8 11/02/2023    MCV 87 11/02/2023     11/02/2023       @[id number](GLU,NA,K,Cl,CO2,BUN,Creatinine,Calcium,MG)@    Predictors of intubation difficulty:       Morbid obesity? yes - BMI 38   Anatomically abnormal facies? no   Prominent incisors? no   Receding mandible? no   Short, thick neck? yes -    Neck range of motion: normal   Dentition: No chipped, loose, or missing teeth.  Mallampati class IV- improves to class 2 with phonation     Cardiographics:      ECG 4/1/24: Sinus tachycardia with no ST or T wave abnormalities.     Imaging:      Chest x-ray: not required    Assessment and Plan:      Epidermal inclusion cyst of right volar wrist  The patient is scheduled for a excision of soft tissue mass of right wrist on 4/11/24 per Dr. Logan   Known risk factors for perioperative complications:     Diabetes mellitus  Obesity  JULI      Difficulty with intubation is not anticipated.    Cardiac Risk Estimation: Based on the Revised Cardiac Risk index, patient is a Class 1 risk with a 3.9% risk  of a major cardiac event in a low risk procedure.    1.) Preoperative workup as follows: ECG, hemoglobin, hematocrit, electrolytes, creatinine, glucose.  2.) Change in medication regimen before surgery: discontinue Wegovy 7 days prior to surgery  3.) Prophylaxis for cardiac events with perioperative beta-blockers: not indicated.  4.) Invasive hemodynamic monitoring perioperatively: not indicated.  5.) Deep vein thrombosis prophylaxis postoperatively: regimen to be chosen by surgical team.  6.) Surveillance for postoperative MI with ECG immediately postoperatively and on postoperati ve days 1 and 2 AND troponin levels 24 hours postoperatively and on day 4 or hospital discharge (whichever comes first): not indicated.  7.) Current medications which may produce withdrawal symptoms if withheld perioperatively: none  8.) Other measures: Careful attention to perioperative glycemic control (Accucheck in preop and PACU).     Pre-diabetes  Hgb A1C on 11/3/23 was 5.7  Cont Wegovy- hold 7 days prior to surgery     Hypogonadism in male  Pt takes testosterone every 2 weeks   F/u with Urology     JULI (obstructive sleep apnea)  Compliant with CPAP

## 2024-04-01 NOTE — PRE ADMISSION SCREENING
Pre op instructions reviewed with patient during Clinic Visit with Provider, verbalized understanding.    To confirm, Surgery is scheduled on 4/11/24. We will call you late afternoon the business day prior to surgery with your arrival time. Your Surgery at [facility].   The address is [address] [place] [address].       *If you are running late or have questions the morning of surgery, please call the Pre-OP Department @ [phone].     PLEASE STOP TAKING WEGOVY 7 DAYS PRIOR TO SURGERY. DO NOT TAKE A DOSE AFTER     Bathing Instructions:    -Shower with anti-bacterial Soap (Hibiclens or Dial) the night before surgery and the morning of.   -Do not use Hibiclens on your face or genitals.   -Apply clean clothes after shower.  -Do not shave your face or body 3 days prior to surgery unless instructed otherwise by your Surgeon.    Only 1 adult allowed (over the age of 18) to accompany you and MUST STAY through the entire length of admission.     -Must have a ride home from a responsible adult that you know and trust.    -Medical Transport, Uber or Lyft can only be used if patient has a responsible adult to accompany them during ride home.  Pediatric patients are encouraged to have 2 adults accompany them to the surgery center.      ~Your ride MUST STAY the entire time until you are discharged~     You may be required to provide a urine sample prior to procedure;   Please ask  for a specimen cup if you need to use the restroom prior to being called into pre-op.     Please come to the main lobby and be prepared to show your photo ID and insurance card.       Nothing to eat or drink after midnight, unless you were instructed to take specific medications discussed with the Pre-admit Nurse.    Please call with any questions or concerns.      [phone]       Thanks.            Strong peripheral pulses

## 2024-04-09 ENCOUNTER — ANESTHESIA EVENT (OUTPATIENT)
Dept: SURGERY | Facility: HOSPITAL | Age: 59
End: 2024-04-09
Payer: COMMERCIAL

## 2024-04-09 NOTE — ANESTHESIA PREPROCEDURE EVALUATION
04/09/2024  Levy Mckoy is a 59 y.o., male.  Past Medical History:   Diagnosis Date    BPH (benign prostatic hyperplasia)     Low testosterone     Pre-diabetes     Sleep apnea, unspecified      Past Surgical History:   Procedure Laterality Date    APPENDECTOMY      CYST REMOVAL      right ankle Right          Pre-op Assessment    I have reviewed the Patient Summary Reports.    I have reviewed the NPO Status.   I have reviewed the Medications.     Review of Systems  Anesthesia Hx:  No problems with previous Anesthesia   History of prior surgery of interest to airway management or planning:  Previous anesthesia: General        Denies Family Hx of Anesthesia complications.    Denies Personal Hx of Anesthesia complications.                    Social:  Non-Smoker       Hematology/Oncology:  Hematology Normal                                     Cardiovascular:  Cardiovascular Normal                                            Pulmonary:        Sleep Apnea, CPAP     Obstructive Sleep Apnea (JULI).      Education provided regarding risk of obstructive sleep apnea            Renal/:    BPH              Hepatic/GI:  Hepatic/GI Normal                 Endocrine:     Pre-diabetes.      Obesity / BMI > 30      Physical Exam  General: Alert and Oriented    Airway:  Mallampati: II   Mouth Opening: Normal  TM Distance: Normal  Tongue: Normal  Neck ROM: Normal ROM  Neck: Girth Increased    Dental:  Intact    Chest/Lungs:  Clear to auscultation, Normal Respiratory Rate    Heart:  Rate: Normal  Rhythm: Regular Rhythm        Anesthesia Plan  Type of Anesthesia, risks & benefits discussed:    Anesthesia Type: Gen Supraglottic Airway, MAC, Gen Natural Airway  Intra-op Monitoring Plan: Standard ASA Monitors  Post Op Pain Control Plan: multimodal analgesia and IV/PO Opioids PRN  Induction:  IV  Informed Consent: Informed  consent signed with the Patient and all parties understand the risks and agree with anesthesia plan.  All questions answered. Patient consented to blood products? No  ASA Score: 2  Day of Surgery Review of History & Physical: H&P Update referred to the surgeon/provider.    Ready For Surgery From Anesthesia Perspective.     .

## 2024-04-10 ENCOUNTER — TELEPHONE (OUTPATIENT)
Dept: PREADMISSION TESTING | Facility: HOSPITAL | Age: 59
End: 2024-04-10
Payer: COMMERCIAL

## 2024-04-11 ENCOUNTER — ANESTHESIA (OUTPATIENT)
Dept: SURGERY | Facility: HOSPITAL | Age: 59
End: 2024-04-11
Payer: COMMERCIAL

## 2024-04-11 ENCOUNTER — HOSPITAL ENCOUNTER (OUTPATIENT)
Facility: HOSPITAL | Age: 59
Discharge: HOME | End: 2024-04-11
Attending: ORTHOPAEDIC SURGERY | Admitting: ORTHOPAEDIC SURGERY
Payer: COMMERCIAL

## 2024-04-11 VITALS
DIASTOLIC BLOOD PRESSURE: 86 MMHG | HEART RATE: 72 BPM | SYSTOLIC BLOOD PRESSURE: 132 MMHG | HEIGHT: 72 IN | WEIGHT: 284.63 LBS | RESPIRATION RATE: 20 BRPM | TEMPERATURE: 98 F | BODY MASS INDEX: 38.55 KG/M2 | OXYGEN SATURATION: 95 %

## 2024-04-11 DIAGNOSIS — L72.0 EPIDERMOID CYST OF HAND: ICD-10-CM

## 2024-04-11 DIAGNOSIS — L72.0 EPIDERMAL INCLUSION CYST: ICD-10-CM

## 2024-04-11 DIAGNOSIS — Z01.818 PREOP TESTING: Primary | ICD-10-CM

## 2024-04-11 LAB
GRAM STN SPEC: NORMAL
GRAM STN SPEC: NORMAL

## 2024-04-11 PROCEDURE — 88341 IMHCHEM/IMCYTCHM EA ADD ANTB: CPT | Mod: 26,,, | Performed by: PATHOLOGY

## 2024-04-11 PROCEDURE — 63600175 PHARM REV CODE 636 W HCPCS

## 2024-04-11 PROCEDURE — 26116 EXC HAND TUM DEEP < 1.5 CM: CPT | Mod: RT,,, | Performed by: ORTHOPAEDIC SURGERY

## 2024-04-11 PROCEDURE — 25000003 PHARM REV CODE 250

## 2024-04-11 PROCEDURE — 88341 IMHCHEM/IMCYTCHM EA ADD ANTB: CPT | Performed by: PATHOLOGY

## 2024-04-11 PROCEDURE — 88342 IMHCHEM/IMCYTCHM 1ST ANTB: CPT | Performed by: PATHOLOGY

## 2024-04-11 PROCEDURE — 36000706: Performed by: ORTHOPAEDIC SURGERY

## 2024-04-11 PROCEDURE — 25000003 PHARM REV CODE 250: Performed by: NURSE ANESTHETIST, CERTIFIED REGISTERED

## 2024-04-11 PROCEDURE — 37000009 HC ANESTHESIA EA ADD 15 MINS: Performed by: ORTHOPAEDIC SURGERY

## 2024-04-11 PROCEDURE — 87077 CULTURE AEROBIC IDENTIFY: CPT | Performed by: ORTHOPAEDIC SURGERY

## 2024-04-11 PROCEDURE — 88307 TISSUE EXAM BY PATHOLOGIST: CPT | Performed by: PATHOLOGY

## 2024-04-11 PROCEDURE — 87070 CULTURE OTHR SPECIMN AEROBIC: CPT | Performed by: ORTHOPAEDIC SURGERY

## 2024-04-11 PROCEDURE — 37000008 HC ANESTHESIA 1ST 15 MINUTES: Performed by: ORTHOPAEDIC SURGERY

## 2024-04-11 PROCEDURE — 25000003 PHARM REV CODE 250: Performed by: ORTHOPAEDIC SURGERY

## 2024-04-11 PROCEDURE — 88342 IMHCHEM/IMCYTCHM 1ST ANTB: CPT | Mod: 26,,, | Performed by: PATHOLOGY

## 2024-04-11 PROCEDURE — 36000707: Performed by: ORTHOPAEDIC SURGERY

## 2024-04-11 PROCEDURE — 71000033 HC RECOVERY, INTIAL HOUR: Performed by: ORTHOPAEDIC SURGERY

## 2024-04-11 PROCEDURE — 87186 SC STD MICRODIL/AGAR DIL: CPT | Performed by: ORTHOPAEDIC SURGERY

## 2024-04-11 PROCEDURE — 87075 CULTR BACTERIA EXCEPT BLOOD: CPT | Performed by: ORTHOPAEDIC SURGERY

## 2024-04-11 PROCEDURE — 71000015 HC POSTOP RECOV 1ST HR: Performed by: ORTHOPAEDIC SURGERY

## 2024-04-11 PROCEDURE — 87205 SMEAR GRAM STAIN: CPT | Performed by: ORTHOPAEDIC SURGERY

## 2024-04-11 PROCEDURE — 88307 TISSUE EXAM BY PATHOLOGIST: CPT | Mod: 26,,, | Performed by: PATHOLOGY

## 2024-04-11 PROCEDURE — 63600175 PHARM REV CODE 636 W HCPCS: Performed by: NURSE ANESTHETIST, CERTIFIED REGISTERED

## 2024-04-11 PROCEDURE — D9220A PRA ANESTHESIA: Mod: ,,, | Performed by: NURSE ANESTHETIST, CERTIFIED REGISTERED

## 2024-04-11 RX ORDER — ONDANSETRON HYDROCHLORIDE 2 MG/ML
INJECTION, SOLUTION INTRAVENOUS
Status: DISCONTINUED | OUTPATIENT
Start: 2024-04-11 | End: 2024-04-11

## 2024-04-11 RX ORDER — CHLORHEXIDINE GLUCONATE ORAL RINSE 1.2 MG/ML
10 SOLUTION DENTAL 2 TIMES DAILY
Status: DISCONTINUED | OUTPATIENT
Start: 2024-04-11 | End: 2024-04-11 | Stop reason: HOSPADM

## 2024-04-11 RX ORDER — FENTANYL CITRATE 50 UG/ML
INJECTION, SOLUTION INTRAMUSCULAR; INTRAVENOUS
Status: DISCONTINUED | OUTPATIENT
Start: 2024-04-11 | End: 2024-04-11

## 2024-04-11 RX ORDER — ONDANSETRON HYDROCHLORIDE 2 MG/ML
4 INJECTION, SOLUTION INTRAVENOUS ONCE AS NEEDED
Status: DISCONTINUED | OUTPATIENT
Start: 2024-04-11 | End: 2024-04-11 | Stop reason: HOSPADM

## 2024-04-11 RX ORDER — LIDOCAINE HYDROCHLORIDE 20 MG/ML
INJECTION, SOLUTION INFILTRATION; PERINEURAL
Status: DISCONTINUED
Start: 2024-04-11 | End: 2024-04-11 | Stop reason: HOSPADM

## 2024-04-11 RX ORDER — PROPOFOL 10 MG/ML
VIAL (ML) INTRAVENOUS
Status: DISCONTINUED | OUTPATIENT
Start: 2024-04-11 | End: 2024-04-11

## 2024-04-11 RX ORDER — FENTANYL CITRATE 50 UG/ML
25 INJECTION, SOLUTION INTRAMUSCULAR; INTRAVENOUS EVERY 5 MIN PRN
Status: DISCONTINUED | OUTPATIENT
Start: 2024-04-11 | End: 2024-04-11 | Stop reason: HOSPADM

## 2024-04-11 RX ORDER — MIDAZOLAM HYDROCHLORIDE 1 MG/ML
INJECTION INTRAMUSCULAR; INTRAVENOUS
Status: DISCONTINUED | OUTPATIENT
Start: 2024-04-11 | End: 2024-04-11

## 2024-04-11 RX ORDER — DIPHENHYDRAMINE HYDROCHLORIDE 50 MG/ML
25 INJECTION INTRAMUSCULAR; INTRAVENOUS EVERY 6 HOURS PRN
Status: DISCONTINUED | OUTPATIENT
Start: 2024-04-11 | End: 2024-04-11 | Stop reason: HOSPADM

## 2024-04-11 RX ORDER — LIDOCAINE HYDROCHLORIDE 20 MG/ML
INJECTION, SOLUTION EPIDURAL; INFILTRATION; INTRACAUDAL; PERINEURAL
Status: DISCONTINUED | OUTPATIENT
Start: 2024-04-11 | End: 2024-04-11

## 2024-04-11 RX ORDER — LIDOCAINE HYDROCHLORIDE 20 MG/ML
INJECTION, SOLUTION EPIDURAL; INFILTRATION; INTRACAUDAL; PERINEURAL
Status: DISCONTINUED | OUTPATIENT
Start: 2024-04-11 | End: 2024-04-11 | Stop reason: HOSPADM

## 2024-04-11 RX ORDER — DEXMEDETOMIDINE HYDROCHLORIDE 100 UG/ML
INJECTION, SOLUTION INTRAVENOUS
Status: DISCONTINUED | OUTPATIENT
Start: 2024-04-11 | End: 2024-04-11

## 2024-04-11 RX ORDER — ALBUTEROL SULFATE 0.83 MG/ML
2.5 SOLUTION RESPIRATORY (INHALATION) EVERY 4 HOURS PRN
Status: DISCONTINUED | OUTPATIENT
Start: 2024-04-11 | End: 2024-04-11 | Stop reason: HOSPADM

## 2024-04-11 RX ORDER — HYDROCODONE BITARTRATE AND ACETAMINOPHEN 5; 325 MG/1; MG/1
1 TABLET ORAL
Status: DISCONTINUED | OUTPATIENT
Start: 2024-04-11 | End: 2024-04-11 | Stop reason: HOSPADM

## 2024-04-11 RX ORDER — SODIUM CHLORIDE 0.9 % (FLUSH) 0.9 %
10 SYRINGE (ML) INJECTION
Status: DISCONTINUED | OUTPATIENT
Start: 2024-04-11 | End: 2024-04-11 | Stop reason: HOSPADM

## 2024-04-11 RX ORDER — HYDROCODONE BITARTRATE AND ACETAMINOPHEN 5; 325 MG/1; MG/1
1 TABLET ORAL EVERY 4 HOURS PRN
Status: DISCONTINUED | OUTPATIENT
Start: 2024-04-11 | End: 2024-04-11 | Stop reason: HOSPADM

## 2024-04-11 RX ORDER — CHLORHEXIDINE GLUCONATE ORAL RINSE 1.2 MG/ML
10 SOLUTION DENTAL
Status: ACTIVE | OUTPATIENT
Start: 2024-04-11

## 2024-04-11 RX ORDER — HYDROCODONE BITARTRATE AND ACETAMINOPHEN 5; 325 MG/1; MG/1
1 TABLET ORAL EVERY 6 HOURS PRN
Qty: 15 TABLET | Refills: 0 | Status: SHIPPED | OUTPATIENT
Start: 2024-04-11

## 2024-04-11 RX ORDER — MEPERIDINE HYDROCHLORIDE 25 MG/ML
12.5 INJECTION INTRAMUSCULAR; INTRAVENOUS; SUBCUTANEOUS ONCE
Status: DISCONTINUED | OUTPATIENT
Start: 2024-04-11 | End: 2024-04-11 | Stop reason: HOSPADM

## 2024-04-11 RX ADMIN — ONDANSETRON 4 MG: 2 INJECTION INTRAMUSCULAR; INTRAVENOUS at 07:04

## 2024-04-11 RX ADMIN — DEXMEDETOMIDINE HYDROCHLORIDE 4 MCG: 100 INJECTION, SOLUTION INTRAVENOUS at 07:04

## 2024-04-11 RX ADMIN — MIDAZOLAM HYDROCHLORIDE 2 MG: 1 INJECTION INTRAMUSCULAR; INTRAVENOUS at 06:04

## 2024-04-11 RX ADMIN — PROPOFOL 60 MG: 10 INJECTION, EMULSION INTRAVENOUS at 07:04

## 2024-04-11 RX ADMIN — LIDOCAINE HYDROCHLORIDE 80 MG: 20 INJECTION, SOLUTION EPIDURAL; INFILTRATION; INTRACAUDAL; PERINEURAL at 07:04

## 2024-04-11 RX ADMIN — DEXTROSE MONOHYDRATE 3 G: 5 INJECTION INTRAVENOUS at 07:04

## 2024-04-11 RX ADMIN — FENTANYL CITRATE 50 MCG: 50 INJECTION, SOLUTION INTRAMUSCULAR; INTRAVENOUS at 07:04

## 2024-04-11 NOTE — TRANSFER OF CARE
Anesthesia Transfer of Care Note    Patient: Levy Mckoy    Procedure(s) Performed: Procedure(s) (LRB):  EXCISION, CYST (Right)    Patient location: PACU    Anesthesia Type: general    Transport from OR: Transported from OR on room air with adequate spontaneous ventilation    Post pain: adequate analgesia    Post assessment: no apparent anesthetic complications and tolerated procedure well    Post vital signs: stable    Level of consciousness: awake, alert and oriented    Nausea/Vomiting: no nausea/vomiting    Complications: none    Transfer of care protocol was followed      Last vitals: Visit Vitals  BP (!) 146/89 (BP Location: Right arm, Patient Position: Sitting)   Pulse 75   Temp 36.6 °C (97.8 °F) (Temporal)   Resp 18   Ht 6' (1.829 m)   Wt 129.1 kg (284 lb 9.8 oz)   SpO2 96%   BMI 38.60 kg/m²

## 2024-04-11 NOTE — ANESTHESIA POSTPROCEDURE EVALUATION
Anesthesia Post Evaluation    Patient: Levy Mckoy    Procedure(s) Performed: Procedure(s) (LRB):  EXCISION, CYST (Right)    Final Anesthesia Type: general      Patient location during evaluation: PACU  Patient participation: Yes- Able to Participate  Level of consciousness: awake and alert and oriented  Post-procedure vital signs: reviewed and stable  Pain management: adequate  Airway patency: patent    PONV status at discharge: No PONV  Anesthetic complications: no      Cardiovascular status: blood pressure returned to baseline, stable and hemodynamically stable  Respiratory status: unassisted  Hydration status: euvolemic  Follow-up not needed.              Vitals Value Taken Time   /90 04/11/24 0758   Temp 36.6 °C (97.8 °F) 04/11/24 0730   Pulse 72 04/11/24 0800   Resp 23 04/11/24 0800   SpO2 95 % 04/11/24 0800   Vitals shown include unvalidated device data.      No case tracking events are documented in the log.      Pain/Nacho Score: No data recorded

## 2024-04-11 NOTE — DISCHARGE SUMMARY
[facility]  Discharge Note  Short Stay    Procedure(s) (LRB):  EXCISION, CYST (Right) volar wrist      OUTCOME: Patient tolerated treatment/procedure well without complication and is now ready for discharge.    DISPOSITION: Home or Self Care    FINAL DIAGNOSIS:  Soft tissue mass right volar wrist    FOLLOWUP: In clinic    DISCHARGE INSTRUCTIONS:    Discharge Procedure Orders   Basic Metabolic Panel   Standing Status: Future Number of Occurrences: 1 Standing Exp. Date: 05/27/25     CBC Auto Differential   Standing Status: Future Number of Occurrences: 1 Standing Exp. Date: 05/27/25     Diet general     Call MD for:  temperature >100.4     Call MD for:  persistent nausea and vomiting     Call MD for:  severe uncontrolled pain     Call MD for:  difficulty breathing, headache or visual disturbances     Call MD for:  redness, tenderness, or signs of infection (pain, swelling, redness, odor or green/yellow discharge around incision site)     Call MD for:  hives     Call MD for:  persistent dizziness or light-headedness     Call MD for:  extreme fatigue     EKG 12-lead   Standing Status: Future Number of Occurrences: 1 Standing Exp. Date: 03/28/25        TIME SPENT ON DISCHARGE:  20 minutes

## 2024-04-11 NOTE — OP NOTE
[facility]  General Surgery  Operative Note    SUMMARY     Date of Procedure: 4/11/2024     Procedure: Procedure(s) (LRB):  EXCISION, CYST (Right) volar wrist     Surgeon(s) and Role:     * Seamus Logan MD - Primary    Assisting Surgeon:  Darlin MO    Pre-Operative Diagnosis: Epidermoid cyst of hand [L72.0]    Post-Operative Diagnosis:  Soft tissue mass right volar wrist    Anesthesia:  Local with sedation    Description:  The patient was taken to the operating room where 10 cc of 2% plain lidocaine use local anesthetic about the volar aspect of the right wrist.  Satisfactory anesthesia had been achieved the right hand was prepped and draped in usual sterile fashion.  After exsanguination of the extremity a proximal tourniquet was inflated to 250 mmHg after patient received 3 g of Ancef intravenously preoperatively.  At this time a elliptical incision was made on the skin over the soft tissue mass.  A 2 cm apparent cyst was identified.  Clear fluid was evacuated and cultures were obtained for anaerobic and aerobic cultures.  There was no sign of clinical infection.  The sac of the cyst was completely excised and submitted to pathology for examination.  No additional pathology was encountered a complete excision was performed as a marginal excision.  At this time skin was closed using horizontal mattress 4-0 nylon suture.  Xeroform gauze 4x4s and 2 ABD pads overwrapped with a 3 in gauze dressing.  The patient tolerated the procedure well was transferred to the recovery room in satisfactory condition.    Significant Surgical Tasks Conducted by the Assistant(s), if Applicable:  No assistant    Complications:  None     Estimated Blood Loss (EBL):  5 mL           Implants: None    Specimens:  Soft tissue mass right volar wrist           Condition: Good    Disposition: PACU - hemodynamically stable.    Attestation: I was present and scrubbed for the entire procedure.

## 2024-04-11 NOTE — DISCHARGE INSTRUCTIONS
After Hand Surgery  After surgery, the better you take care of yourself--especially your hand--the sooner it will heal. Follow your surgeons instructions. Try not to bump your hand, and dont move or lift anything while youre still wearing bandages, a splint, or a cast.  Care for your hand    Keep your hand elevated above heart level as much as possible for the first several days after surgery. This helps reduce swelling and pain.  To help prevent infection and speed healing, take care not to get your cast or bandages wet.  Keep dressing clean, dry, & intact until your follow up appointment.   Relieve pain as directed  Your surgeon may prescribe pain medicine or suggest you take an anti-inflammatory medicine. You might also be instructed to apply ice (or another cold source) to your hand. If you use ice cubes, put them in a plastic bag and rest it on top of your bandages. Leave the cold source on your hand for as long as its comfortable. Do this several times a day for the first few days after surgery. It may take several minutes before you can feel the cold through the cast or bandages.  Follow up with your surgeon  During a follow-up visit after surgery, your surgeon will check your progress. The stitches, bandages, splint, or cast may be removed. A new cast or splint may be placed. If your hand has healed enough, your surgeon may prescribe exercises.  Do prescribed hand exercises  Your surgeon may recommend that you do exercises. These may be done under the guidance of a physical or occupational therapist. The exercises strengthen your hand, help you regain flexibility, and restore proper function. Do the exercises as advised.  Call your surgeon if you have...  A fever higher than 100.4°F (38.0°C) taken by mouth  Side effects from your medicine, such as prolonged nausea  A wet or loose dressing, or a dressing that is too tight  Excessive bleeding  Increased, ongoing pain or numbness  Signs of infection (such  as drainage, warmth, or redness) at the incision site   Date Last Reviewed: 11/11/2015  © 8096-1170 [employer]. [address] [place] [address], [place] [address]. All rights reserved. This information is not intended as a substitute for professional medical care. Always follow your healthcare professional's instructions.

## 2024-04-15 LAB
BACTERIA SPEC AEROBE CULT: ABNORMAL
BACTERIA SPEC ANAEROBE CULT: NORMAL

## 2024-04-16 ENCOUNTER — OFFICE VISIT (OUTPATIENT)
Dept: ORTHOPEDICS | Facility: CLINIC | Age: 59
End: 2024-04-16
Payer: COMMERCIAL

## 2024-04-16 VITALS — BODY MASS INDEX: 37.38 KG/M2 | HEIGHT: 72 IN | WEIGHT: 276 LBS

## 2024-04-16 DIAGNOSIS — L72.0 EPIDERMOID CYST OF HAND: Primary | ICD-10-CM

## 2024-04-16 DIAGNOSIS — Z98.890 POST-OPERATIVE STATE: ICD-10-CM

## 2024-04-16 LAB
FINAL PATHOLOGIC DIAGNOSIS: NORMAL
GROSS: NORMAL
Lab: NORMAL

## 2024-04-16 PROCEDURE — 99024 POSTOP FOLLOW-UP VISIT: CPT | Mod: S$GLB,,,

## 2024-04-16 PROCEDURE — 99999 PR PBB SHADOW E&M-EST. PATIENT-LVL III: CPT | Mod: PBBFAC,,,

## 2024-04-16 NOTE — PROGRESS NOTES
SUBJECTIVE:      Patient ID: Levy Mckoy is a 59 y.o. male.    HPI: Mr. Mckoy is here today for post-operative visit #1.  He is 5 days status post EXCISION, CYST (Right) volar wrist by Dr. Logan on 4/11/24. He reports that he is doing well.  Pain is 1/10. He is taking aspirin as needed for pain relief.  He has been compliant with postop instructions and keeping the extremity dry. He denies fever, chills, and sweats since the time of the surgery.     Past Medical History:   Diagnosis Date    BPH (benign prostatic hyperplasia)     Low testosterone     Pre-diabetes     Sleep apnea, unspecified      Past Surgical History:   Procedure Laterality Date    APPENDECTOMY      CYST REMOVAL      CYST REMOVAL Right 4/11/2024    Procedure: EXCISION, CYST;  Surgeon: Seamus Logan MD;  Location: [facility];  Service: Orthopedics;  Laterality: Right;  soft tissue mass excised right volar wrist    right ankle Right      Family History   Problem Relation Name Age of Onset    Cancer Mother          Breast    Heart disease Father      Diabetes Father      Coronary artery disease Father  72     Social History     Socioeconomic History    Marital status:    Occupational History    Occupation: Site safety and health officer   Tobacco Use    Smoking status: Never     Passive exposure: Never    Smokeless tobacco: Never   Substance and Sexual Activity    Alcohol use: Yes     Comment: very rare    Drug use: No    Sexual activity: Yes     Partners: Female     Medication List with Changes/Refills   Current Medications    HYDROCODONE-ACETAMINOPHEN (NORCO) 5-325 MG PER TABLET    Take 1 tablet by mouth every 6 (six) hours as needed for Pain.    SEMAGLUTIDE, WEIGHT LOSS, (WEGOVY) 2.4 MG/0.75 ML PNIJ    Inject 2.4 mg into the skin every 7 days.    TADALAFIL (CIALIS) 20 MG TAB    Take 1 tablet (20 mg total) by mouth once daily.    TESTOSTERONE CYPIONATE (DEPOTESTOTERONE CYPIONATE) 200 MG/ML INJECTION    Inject 1 mL (200 mg  total) into the muscle every 14 (fourteen) days. INJECT 1ML INTO THE MUSCLE EVERY 21 DAYS    TRIAMCINOLONE ACETONIDE 0.1% (KENALOG) 0.1 % OINTMENT    Apply topically 2 (two) times daily.     Review of patient's allergies indicates:  No Known Allergies    OBJECTIVE:     Physical exam:    Vitals:    04/16/24 0840   Weight: 125.2 kg (276 lb)   Height: 6' (1.829 m)   PainSc:   1   PainLoc: Wrist     Vital signs are stable, patient is afebrile.  Patient is well dressed and well groomed, no acute distress.  Alert and oriented to person, place, and time.    Right UE:  Post op dressing taken down.   Incision is clean, dry, and intact. Wound margins well approximated  There is no erythema or exudate. There is no sign of any infection.   Mild swelling locally.  He is NVI.   Sutures in place.   2+ pulses noted.  Cap refill <2 seconds  Motor intact to hand    ASSESSMENT         Encounter Diagnoses   Name Primary?    Epidermoid cyst of hand Yes    Post-operative state             5 days status post EXCISION, CYST (Right) volar wrist    PLAN:           Levy was seen today for post-op evaluation and pain.    Diagnoses and all orders for this visit:    Epidermoid cyst of hand    Post-operative state      - PO instruction reviewed and provided to patient  - path results pending  - The incision was cleaned with hydrogen peroxide and normal saline. A sterile Band-Aid was applied.   - Patient may clean the incision daily as above.   - Wrist brace provided. Patient may use brace as needed for symptomatic relief.    - Patient should notify the office of any signs or symptoms of infection including fevers, erythema, purulent drainage, increasing pain.    - Follow up for suture removal     POST OPERATIVE INSTRUCTIONS - Visit #1    BANDAGES/DRESSING/BATHING:  We have removed the dressing, cleaned your incision, and put on a band-aid at your first post op visit today. You may clean the incision daily as we did today. Keep the incision clean  and dry. When showering, you may cover the incision with a plastic bag/umbrella bag.   Do not use Neosporin or other topical ointments or creams on the incision. If you are concerned about any redness or drainage of the incisions, please call the office.    SWELLING  Some swelling of your arm, hand and fingers is normal. The swelling can be decreased by  elevating your arm on a few pillows when lying down. Swelling can be further controlled by cold therapy over the surgical site. Flexion/extension of the fingers (opening and closing your hands) will also help to relieve swelling and prevent stiffness.    ACTIVITY  You may use the hand and wrist as tolerated for light activity. You are encouraged to bend the wrist, elbow and fingers as soon as the initial surgical dressing is removed. Avoid lifting, pushing, or pulling any object greater than 5-10 pounds for the first 10-14 days.     BRACE  Wear your brace or splint as directed.    MEDICATIONS  Take pain medicines exactly as directed.  If the doctor gave you a prescription medicine for pain, take it as prescribed.  If you are not taking a prescription pain medicine, take an over-the-counter medicine such as acetaminophen (Tylenol), ibuprofen (Advil, Motrin), or naproxen (Aleve) as long as you do not have an allergy or medical condition that prevents you from taking them.  Do not take two or more pain medicines at the same time unless the doctor told you to. Many pain medicines have acetaminophen, which is Tylenol. Too much acetaminophen (Tylenol) can be harmful.    FOLLOW -UP  You should be scheduled for a post-op appointment within the 10-14 days following surgery, at which time we will review your surgery, remove sutures and evaluate incisions, review your post-operative program and answer any of your questions.          [facility]EL[facility]

## 2024-04-23 ENCOUNTER — OFFICE VISIT (OUTPATIENT)
Dept: ORTHOPEDICS | Facility: CLINIC | Age: 59
End: 2024-04-23
Payer: COMMERCIAL

## 2024-04-23 VITALS — HEIGHT: 72 IN | BODY MASS INDEX: 37.38 KG/M2 | WEIGHT: 276 LBS

## 2024-04-23 DIAGNOSIS — D23.9 NODULAR HIDRADENOMA: Primary | ICD-10-CM

## 2024-04-23 DIAGNOSIS — Z98.890 POST-OPERATIVE STATE: ICD-10-CM

## 2024-04-23 PROCEDURE — 99024 POSTOP FOLLOW-UP VISIT: CPT | Mod: S$GLB,,,

## 2024-04-23 PROCEDURE — 99999 PR PBB SHADOW E&M-EST. PATIENT-LVL III: CPT | Mod: PBBFAC,,,

## 2024-04-23 NOTE — PROGRESS NOTES
SUBJECTIVE:      Patient ID: Levy Mckoy is a 59 y.o. male.    HPI: Mr. Mckoy is here today for post-operative visit #2.  He is 12 days status post EXCISION, CYST (Right) volar wrist by Dr. Logan on 4/11/24. He reports that he is doing very well.  Pain is 0/10.  He is taking no medicine for pain.  He has been compliant with postop instructions and keeping the extremity dry. He denies fever, chills, and sweats since the time of the surgery.     Interval hx 4/16/24: Mr. Mckoy is here today for post-operative visit #1.  He is 5 days status post EXCISION, CYST (Right) volar wrist by Dr. Logan on 4/11/24. He reports that he is doing well.  Pain is 1/10. He is taking aspirin as needed for pain relief.  He has been compliant with postop instructions and keeping the extremity dry. He denies fever, chills, and sweats since the time of the surgery.     Past Medical History:   Diagnosis Date    BPH (benign prostatic hyperplasia)     Low testosterone     Pre-diabetes     Sleep apnea, unspecified      Past Surgical History:   Procedure Laterality Date    APPENDECTOMY      CYST REMOVAL      CYST REMOVAL Right 4/11/2024    Procedure: EXCISION, CYST;  Surgeon: Seamus Logan MD;  Location: [facility];  Service: Orthopedics;  Laterality: Right;  soft tissue mass excised right volar wrist    right ankle Right      Family History   Problem Relation Name Age of Onset    Cancer Mother          Breast    Heart disease Father      Diabetes Father      Coronary artery disease Father  72     Social History     Socioeconomic History    Marital status:    Occupational History    Occupation: Site safety and health officer   Tobacco Use    Smoking status: Never     Passive exposure: Never    Smokeless tobacco: Never   Substance and Sexual Activity    Alcohol use: Yes     Comment: very rare    Drug use: No    Sexual activity: Yes     Partners: Female     Medication List with Changes/Refills   Current Medications     HYDROCODONE-ACETAMINOPHEN (NORCO) 5-325 MG PER TABLET    Take 1 tablet by mouth every 6 (six) hours as needed for Pain.    SEMAGLUTIDE, WEIGHT LOSS, (WEGOVY) 2.4 MG/0.75 ML PNIJ    Inject 2.4 mg into the skin every 7 days.    TADALAFIL (CIALIS) 20 MG TAB    Take 1 tablet (20 mg total) by mouth once daily.    TESTOSTERONE CYPIONATE (DEPOTESTOTERONE CYPIONATE) 200 MG/ML INJECTION    Inject 1 mL (200 mg total) into the muscle every 14 (fourteen) days. INJECT 1ML INTO THE MUSCLE EVERY 21 DAYS    TRIAMCINOLONE ACETONIDE 0.1% (KENALOG) 0.1 % OINTMENT    Apply topically 2 (two) times daily.     Review of patient's allergies indicates:  No Known Allergies    OBJECTIVE:     Physical exam:    Vitals:    04/23/24 1351   Weight: 125.2 kg (276 lb 0.3 oz)   Height: 6' (1.829 m)   PainSc: 0-No pain     Vital signs are stable, patient is afebrile.  Patient is well dressed and well groomed, no acute distress.  Alert and oriented to person, place, and time.    Right UE:  Incision is clean, dry, and intact. Wound margins well approximated  There is no erythema or exudate. There is no sign of any infection.   Mild post op swelling locally  He is NVI.   Sutures in place.   2+ pulses noted.  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      Comment:  This is a well-circumscribed cystic and solid lesion without epidermal involvement consisting of bland basaloid and squamous epithelial elements with focal clear cell features.  Tumor cells are positive with AE1/AE3 cytokeratin and P40.  The  overall findings are consistent with a hidradenoma, a benign neoplasm of sweat gland elements. Dr Maritza Rush, dermatopathologist, also reviewed this case.   Comment: Interp By MIKY Alegria M.D., Signed on 04/16/2024 at 16:15       ASSESSMENT         Encounter Diagnoses   Name Primary?    Nodular hidradenoma Yes    Post-operative state              12 days status post EXCISION, CYST (Right) volar wrist    PLAN:           Levy was seen today for post-op evaluation.    Diagnoses and all orders for this visit:    Nodular hidradenoma    Post-operative state        - PO instruction reviewed and provided to patient  - The incision was cleaned with hydrogen peroxide. Sutures removed with no difficulty. Steri-Strips applied.   - Patient may use brace as needed for symptomatic relief.    - Patient should notify the office of any signs or symptoms of infection including fevers, erythema, purulent drainage, increasing pain.    - Follow up PRN     POST OPERATIVE VISIT INSTRUCTIONS - Visit #2    1. No soaking the incision for at least 7-10 days. You may get it wet in the shower and use regular soap.    2. To avoid a hard, painful scar, we recommend you use Mederma and/or Silicone Scar patches. You may also use Cocoa Butter, Vitamin E oil, Coconut oil, etc.    You may start this 5-7 days after stitches are removed and when wound is completely closed.    - Mederma scar cream: scar massage over incision 1-2 times per day. You may use topical lotion or Vitamin E oil. Massage an additional few times a day to help the scar become soft and less sensitive.    - Silicone Scar Patches: cut and place over the incision, then massage over the patch to help with scarring and sensitivity. Can be reused up to 10 days if you rinse it clean, let it dry out, then reapply.    Brands: Mepiform Silicone Scar Sheets (recommended), Scar Away, [employer] brand or generic pharmacy brand ([employer], [employer], [employer])    3. Continue range of motion exercises as instructed at todays visit.    4. You may take Tylenol 500mg and/or Ibuprofen 400mg every 4-6 hours with food for pain as tolerated as long as you do not have an allergy or medical condition that prevents you from taking them.    5. Therapy recommended: none at this time    6. Immobilization: brace as needed    7. Lifting restrictions:  start light at about 10lb and increase gradually as tolerated    8. Follow up: kunal Meyer PA-C   [facility]

## 2024-05-14 ENCOUNTER — PATIENT MESSAGE (OUTPATIENT)
Dept: ORTHOPEDICS | Facility: CLINIC | Age: 59
End: 2024-05-14
Payer: COMMERCIAL

## 2024-05-14 ENCOUNTER — PATIENT MESSAGE (OUTPATIENT)
Dept: FAMILY MEDICINE | Facility: CLINIC | Age: 59
End: 2024-05-14
Payer: COMMERCIAL

## 2024-05-21 ENCOUNTER — OFFICE VISIT (OUTPATIENT)
Dept: ORTHOPEDICS | Facility: CLINIC | Age: 59
End: 2024-05-21
Payer: COMMERCIAL

## 2024-05-21 ENCOUNTER — OFFICE VISIT (OUTPATIENT)
Dept: FAMILY MEDICINE | Facility: CLINIC | Age: 59
End: 2024-05-21
Attending: FAMILY MEDICINE
Payer: COMMERCIAL

## 2024-05-21 VITALS
HEART RATE: 96 BPM | SYSTOLIC BLOOD PRESSURE: 134 MMHG | TEMPERATURE: 98 F | DIASTOLIC BLOOD PRESSURE: 78 MMHG | HEIGHT: 72 IN | BODY MASS INDEX: 39.25 KG/M2 | WEIGHT: 289.81 LBS | OXYGEN SATURATION: 95 %

## 2024-05-21 VITALS — WEIGHT: 276 LBS | HEIGHT: 72 IN | BODY MASS INDEX: 37.38 KG/M2

## 2024-05-21 DIAGNOSIS — E66.01 SEVERE OBESITY (BMI 35.0-39.9) WITH COMORBIDITY: ICD-10-CM

## 2024-05-21 DIAGNOSIS — L98.9 SKIN LESION: Primary | ICD-10-CM

## 2024-05-21 DIAGNOSIS — R73.03 PRE-DIABETES: ICD-10-CM

## 2024-05-21 DIAGNOSIS — D23.9 NODULAR HIDRADENOMA: Primary | ICD-10-CM

## 2024-05-21 PROBLEM — H61.21 IMPACTED CERUMEN, RIGHT EAR: Status: RESOLVED | Noted: 2021-12-08 | Resolved: 2024-05-21

## 2024-05-21 PROBLEM — L72.0 EPIDERMAL INCLUSION CYST: Status: RESOLVED | Noted: 2024-04-01 | Resolved: 2024-05-21

## 2024-05-21 PROCEDURE — 99999 PR PBB SHADOW E&M-EST. PATIENT-LVL IV: CPT | Mod: PBBFAC,,, | Performed by: FAMILY MEDICINE

## 2024-05-21 PROCEDURE — 99214 OFFICE O/P EST MOD 30 MIN: CPT | Mod: S$GLB,,, | Performed by: FAMILY MEDICINE

## 2024-05-21 PROCEDURE — 99024 POSTOP FOLLOW-UP VISIT: CPT | Mod: S$GLB,,, | Performed by: ORTHOPAEDIC SURGERY

## 2024-05-21 PROCEDURE — 99999 PR PBB SHADOW E&M-EST. PATIENT-LVL III: CPT | Mod: PBBFAC,,, | Performed by: ORTHOPAEDIC SURGERY

## 2024-05-21 RX ORDER — SEMAGLUTIDE 2.4 MG/.75ML
2.4 INJECTION, SOLUTION SUBCUTANEOUS
Qty: 3 EACH | Refills: 0 | Status: SHIPPED | OUTPATIENT
Start: 2024-05-21

## 2024-05-21 RX ORDER — TIRZEPATIDE 10 MG/.5ML
10 INJECTION, SOLUTION SUBCUTANEOUS
Qty: 6 EACH | Refills: 0 | Status: SHIPPED | OUTPATIENT
Start: 2024-05-21

## 2024-05-21 NOTE — PROGRESS NOTES
Subjective:       Patient ID: Levy Mckoy is a 59 y.o. male.    Chief Complaint: Med Change    59 y old male with pre dm , obesity here for f.u . Appetite is not suppressed  anymore with Wegovy . He will like to switch to Zepboud. Job is very physical . Also with lesion on top of r shoulder for 8 months .       Review of Systems   Constitutional: Negative.    HENT: Negative.     Eyes: Negative.    Respiratory: Negative.     Cardiovascular: Negative.    Gastrointestinal: Negative.    Genitourinary: Negative.    Musculoskeletal: Negative.    Skin:  Positive for rash.   Hematological: Negative.        Objective:      Physical Exam  Constitutional:       General: He is not in acute distress.     Appearance: He is well-developed. He is not diaphoretic.   HENT:      Head: Normocephalic and atraumatic.      Right Ear: External ear normal.      Left Ear: External ear normal.      Nose: Nose normal.      Mouth/Throat:      Pharynx: No oropharyngeal exudate.   Eyes:      General: No scleral icterus.        Right eye: No discharge.         Left eye: No discharge.      Conjunctiva/sclera: Conjunctivae normal.      Pupils: Pupils are equal, round, and reactive to light.   Neck:      Thyroid: No thyromegaly.      Vascular: No JVD.      Trachea: No tracheal deviation.   Cardiovascular:      Rate and Rhythm: Normal rate and regular rhythm.      Heart sounds: Normal heart sounds. No murmur heard.     No friction rub. No gallop.   Pulmonary:      Effort: Pulmonary effort is normal. No respiratory distress.      Breath sounds: Normal breath sounds. No stridor. No wheezing or rales.   Chest:      Chest wall: No tenderness.   Abdominal:      General: Bowel sounds are normal. There is no distension.      Palpations: Abdomen is soft.      Tenderness: There is no abdominal tenderness. There is no guarding or rebound.   Musculoskeletal:         General: No tenderness. Normal range of motion.      Cervical back: Normal range of motion  and neck supple.   Lymphadenopathy:      Cervical: No cervical adenopathy.   Skin:     General: Skin is warm and dry.      Coloration: Skin is not pale.      Findings: Rash present. No erythema. Rash is crusting.          Neurological:      Mental Status: He is alert and oriented to person, place, and time.      Cranial Nerves: No cranial nerve deficit.      Motor: No abnormal muscle tone.      Coordination: Coordination normal.      Deep Tendon Reflexes: Reflexes are normal and symmetric. Reflexes normal.   Psychiatric:         Behavior: Behavior normal.         Thought Content: Thought content normal.         Judgment: Judgment normal.         Assessment:     Levy was seen today for med change.    Diagnoses and all orders for this visit:    Skin lesion  -     Ambulatory referral/consult to Dermatology; Future    Severe obesity (BMI 35.0-39.9) with comorbidity    Pre-diabetes    Other orders  -     tirzepatide, weight loss, (ZEPBOUND) 10 mg/0.5 mL PnIj; Inject 10 mg into the skin every 7 days.  -     semaglutide, weight loss, (WEGOVY) 2.4 mg/0.75 mL PnIj; Inject 2.4 mg into the skin every 7 days.           Plan:   Derm   Switch to Zepbound . Email progress in 3 weeks   Diet and exercise

## 2024-05-21 NOTE — PROGRESS NOTES
Subjective:     Patient ID: Levy Mckoy is a 59 y.o. male.    Chief Complaint: Pain and Post-op Evaluation of the Right Wrist      HPI:  The patient is a 59-year-old male seen in follow-up after excision nodular hidradenoma 04/11/2024 from the right palm.    Past Medical History:   Diagnosis Date    BPH (benign prostatic hyperplasia)     Low testosterone     Pre-diabetes     Sleep apnea, unspecified      Past Surgical History:   Procedure Laterality Date    APPENDECTOMY      CYST REMOVAL      CYST REMOVAL Right 4/11/2024    Procedure: EXCISION, CYST;  Surgeon: Seamus Logan MD;  Location: [facility];  Service: Orthopedics;  Laterality: Right;  soft tissue mass excised right volar wrist    right ankle Right      Family History   Problem Relation Name Age of Onset    Cancer Mother          Breast    Heart disease Father      Diabetes Father      Coronary artery disease Father  72     Social History     Socioeconomic History    Marital status:    Occupational History    Occupation: Site safety and health officer   Tobacco Use    Smoking status: Never     Passive exposure: Never    Smokeless tobacco: Never   Substance and Sexual Activity    Alcohol use: Yes     Comment: very rare    Drug use: No    Sexual activity: Yes     Partners: Female     Medication List with Changes/Refills   Current Medications    HYDROCODONE-ACETAMINOPHEN (NORCO) 5-325 MG PER TABLET    Take 1 tablet by mouth every 6 (six) hours as needed for Pain.    SEMAGLUTIDE, WEIGHT LOSS, (WEGOVY) 2.4 MG/0.75 ML PNIJ    Inject 2.4 mg into the skin every 7 days.    TADALAFIL (CIALIS) 20 MG TAB    Take 1 tablet (20 mg total) by mouth once daily.    TESTOSTERONE CYPIONATE (DEPOTESTOTERONE CYPIONATE) 200 MG/ML INJECTION    Inject 1 mL (200 mg total) into the muscle every 14 (fourteen) days. INJECT 1ML INTO THE MUSCLE EVERY 21 DAYS    TRIAMCINOLONE ACETONIDE 0.1% (KENALOG) 0.1 % OINTMENT    Apply topically 2 (two) times daily.     Review of  patient's allergies indicates:  No Known Allergies  Review of Systems   Constitutional: Negative for malaise/fatigue.   HENT:  Negative for hearing loss.    Eyes:  Negative for double vision and visual disturbance.   Cardiovascular:  Negative for chest pain.   Respiratory:  Positive for sleep disturbances due to breathing. Negative for shortness of breath.    Endocrine: Negative for cold intolerance.   Hematologic/Lymphatic: Does not bruise/bleed easily.   Skin:  Negative for poor wound healing and suspicious lesions.   Musculoskeletal:  Positive for joint pain. Negative for gout and joint swelling.   Gastrointestinal:  Negative for nausea and vomiting.   Genitourinary:  Negative for dysuria.   Neurological:  Negative for numbness, paresthesias and sensory change.   Psychiatric/Behavioral:  Negative for depression, memory loss and substance abuse. The patient is not nervous/anxious.    Allergic/Immunologic: Negative for persistent infections.       Objective:   Body mass index is 37.43 kg/m².  There were no vitals filed for this visit.             General    Constitutional: He is oriented to person, place, and time. He appears well-developed and well-nourished. No distress.   HENT:   Head: Normocephalic.   Eyes: EOM are normal.   Pulmonary/Chest: Effort normal.   Neurological: He is oriented to person, place, and time.   Psychiatric: He has a normal mood and affect.             Right Hand/Wrist Exam     Inspection   Scars: Wrist - present Hand -  present  Effusion: Wrist - absent Hand -  absent    Tenderness   The patient is tender to palpation of the barrios area.    Other     Neuorologic Exam    Median Distribution: normal  Ulnar Distribution: normal  Radial Distribution: normal    Comments:  The patient has well-healed surgical scar.  The scar is hard red bumpy and tender.          Vascular Exam       Capillary Refill  Right Hand: normal capillary refill          Relevant imaging results reviewed and interpreted  by me, discussed with the patient and / or family today radiographs were not obtained today  Assessment:     Encounter Diagnosis   Name Primary?    Nodular hidradenoma Yes    Right palm    Plan:     The patient was counseled regarding the diagnosis.  He was given a copy of the path report and shown pictures online.  He will return in 2 months to follow his wound.  He is worried about recurrence                Disclaimer: This note was prepared using a voice recognition system and is likely to have sound alike errors within the text.

## 2024-05-28 ENCOUNTER — PATIENT MESSAGE (OUTPATIENT)
Dept: FAMILY MEDICINE | Facility: CLINIC | Age: 59
End: 2024-05-28
Payer: COMMERCIAL

## 2024-06-04 ENCOUNTER — OFFICE VISIT (OUTPATIENT)
Dept: FAMILY MEDICINE | Facility: CLINIC | Age: 59
End: 2024-06-04
Attending: FAMILY MEDICINE
Payer: COMMERCIAL

## 2024-06-04 VITALS
WEIGHT: 290.25 LBS | HEART RATE: 101 BPM | SYSTOLIC BLOOD PRESSURE: 120 MMHG | TEMPERATURE: 98 F | BODY MASS INDEX: 39.31 KG/M2 | DIASTOLIC BLOOD PRESSURE: 80 MMHG | OXYGEN SATURATION: 94 % | HEIGHT: 72 IN

## 2024-06-04 DIAGNOSIS — L98.9 SKIN LESION: Primary | ICD-10-CM

## 2024-06-04 PROCEDURE — 11104 PUNCH BX SKIN SINGLE LESION: CPT | Mod: S$GLB,,, | Performed by: FAMILY MEDICINE

## 2024-06-04 PROCEDURE — 99499 UNLISTED E&M SERVICE: CPT | Mod: S$GLB,,, | Performed by: FAMILY MEDICINE

## 2024-06-04 PROCEDURE — 88305 TISSUE EXAM BY PATHOLOGIST: CPT | Mod: 26,,, | Performed by: PATHOLOGY

## 2024-06-04 PROCEDURE — 88305 TISSUE EXAM BY PATHOLOGIST: CPT | Performed by: PATHOLOGY

## 2024-06-04 PROCEDURE — 99999 PR PBB SHADOW E&M-EST. PATIENT-LVL IV: CPT | Mod: PBBFAC,,, | Performed by: FAMILY MEDICINE

## 2024-06-04 NOTE — PROGRESS NOTES
Subjective:       Patient ID: Levy Mckoy is a 59 y.o. male.    Chief Complaint: Follow-up (Skin tag)    Punch Biopsy Procedure Note    Pre-operative Diagnosis: Actinic keratosis    Post-operative Diagnosis: same    Locations: R superior shoulder    Indications: Diagnosis     Anesthesia: Lidocaine 1% with epinephrine without added sodium bicarbonate    Procedure Details   History of allergy to iodine: no  Patient informed of the risks (including bleeding and infection) and benefits of the   procedure and Written informed consent obtained.    The lesion and surrounding area was given a sterile prep using betadyne and draped in the usual sterile fashion. The skin was then stretched perpendicular to the skin tension lines and the lesion removed using the 4 mm punch. The resulting ellipse was then closed. The wound was closed with 5-0 Nylon using simple interrupted stitches. Antibiotic ointment and a sterile dressing applied. The specimen was sent for pathologic examination. The patient tolerated the procedure well.    EBL: 1 ml    Findings:  Same as pre op     Condition:  Stable    Complications:  none.    Plan:  1. Instructed to keep the wound dry and covered for 24-48h and clean thereafter.   2. Warning signs of infection were reviewed.    3. Recommended that the patient use Aspirin as needed for pain.   4. Return for suture removal in 7 days.    Review of Systems    Objective:      Physical Exam    Assessment:       1. Skin lesion        Plan:     Levy was seen today for follow-up.    Diagnoses and all orders for this visit:    Skin lesion  -     Specimen to Pathology, Dermatology

## 2024-06-05 ENCOUNTER — PATIENT MESSAGE (OUTPATIENT)
Dept: FAMILY MEDICINE | Facility: CLINIC | Age: 59
End: 2024-06-05
Payer: COMMERCIAL

## 2024-06-11 LAB
FINAL PATHOLOGIC DIAGNOSIS: NORMAL
GROSS: NORMAL
Lab: NORMAL
MICROSCOPIC EXAM: NORMAL

## 2024-06-12 ENCOUNTER — CLINICAL SUPPORT (OUTPATIENT)
Dept: FAMILY MEDICINE | Facility: CLINIC | Age: 59
End: 2024-06-12
Payer: COMMERCIAL

## 2024-06-12 ENCOUNTER — TELEPHONE (OUTPATIENT)
Dept: FAMILY MEDICINE | Facility: CLINIC | Age: 59
End: 2024-06-12
Payer: COMMERCIAL

## 2024-06-12 DIAGNOSIS — C44.91 BASAL CELL CARCINOMA (BCC), UNSPECIFIED SITE: Primary | ICD-10-CM

## 2024-06-12 DIAGNOSIS — L98.9 SKIN LESION: Primary | ICD-10-CM

## 2024-06-12 NOTE — TELEPHONE ENCOUNTER
----- Message from Gemini Antoine MA sent at 6/12/2024  8:05 AM CDT -----  Regarding: RE: basal cell carcinoma  Good morning, unfortunately Dr. Villasenor doesn't have anything available at this time.  Patient is scheduled for the soonest appointment.  I will add pt to the wait list.  ----- Message -----  From: Vanesa Milton MA  Sent: 6/11/2024   5:02 PM CDT  To: [facility] Staff  Subject: basal cell carcinoma                             Good afternoon!  I scheduled patient to see Dr. Villasenor on 6/25, however, can pt be seen sooner? Dr. Bertrand did biopsy on his skin lesion and it came back basal cell carcinoma.   Please let me know.  Thank you!

## 2024-06-15 ENCOUNTER — PATIENT MESSAGE (OUTPATIENT)
Dept: FAMILY MEDICINE | Facility: CLINIC | Age: 59
End: 2024-06-15
Payer: COMMERCIAL

## 2024-06-19 NOTE — TELEPHONE ENCOUNTER
Care Due:                  Date            Visit Type   Department     Provider  --------------------------------------------------------------------------------                                EP -                              PRIMARY      [facility]  Michelle MENDOZA  Last Visit: 06-      CARE ([facility])   [facility]       Kathia  Next Visit: None Scheduled  None         None Found                                                            Last  Test          Frequency    Reason                     Performed    Due Date  --------------------------------------------------------------------------------    HBA1C.......  6 months...  semaglutide,,              11- 05-                             tirzepatide,.............    [employer] Embedded Care Due Messages. Reference number: [id number].   6/19/2024 3:20:03 PM CDT

## 2024-06-20 RX ORDER — TIRZEPATIDE 7.5 MG/.5ML
7.5 INJECTION, SOLUTION SUBCUTANEOUS
Qty: 4 PEN | Refills: 0 | Status: SHIPPED | OUTPATIENT
Start: 2024-06-20

## 2024-06-25 ENCOUNTER — OFFICE VISIT (OUTPATIENT)
Dept: DERMATOLOGY | Facility: CLINIC | Age: 59
End: 2024-06-25
Attending: FAMILY MEDICINE
Payer: COMMERCIAL

## 2024-06-25 VITALS — HEIGHT: 72 IN | BODY MASS INDEX: 39.33 KG/M2 | WEIGHT: 290.38 LBS

## 2024-06-25 DIAGNOSIS — L82.1 SEBORRHEIC KERATOSES: ICD-10-CM

## 2024-06-25 DIAGNOSIS — L57.0 ACTINIC KERATOSIS: ICD-10-CM

## 2024-06-25 DIAGNOSIS — L98.9 SKIN LESION: ICD-10-CM

## 2024-06-25 DIAGNOSIS — B35.1 ONYCHOMYCOSIS: ICD-10-CM

## 2024-06-25 DIAGNOSIS — C44.91 BASAL CELL CARCINOMA (BCC), UNSPECIFIED SITE: Primary | ICD-10-CM

## 2024-06-25 PROCEDURE — 99999 PR PBB SHADOW E&M-EST. PATIENT-LVL III: CPT | Mod: PBBFAC,,, | Performed by: STUDENT IN AN ORGANIZED HEALTH CARE EDUCATION/TRAINING PROGRAM

## 2024-06-25 PROCEDURE — 99204 OFFICE O/P NEW MOD 45 MIN: CPT | Mod: 25,S$GLB,, | Performed by: STUDENT IN AN ORGANIZED HEALTH CARE EDUCATION/TRAINING PROGRAM

## 2024-06-25 PROCEDURE — 17000 DESTRUCT PREMALG LESION: CPT | Mod: S$GLB,,, | Performed by: STUDENT IN AN ORGANIZED HEALTH CARE EDUCATION/TRAINING PROGRAM

## 2024-06-25 RX ORDER — TERBINAFINE HYDROCHLORIDE 250 MG/1
250 TABLET ORAL DAILY
Qty: 90 TABLET | Refills: 0 | Status: SHIPPED | OUTPATIENT
Start: 2024-06-25 | End: 2024-07-25

## 2024-06-25 NOTE — PROGRESS NOTES
Subjective:       Patient ID:  Levy Mckoy is a 59 y.o. male who presents for   Chief Complaint   Patient presents with    Lesion     History of Present Illness: The patient presents with chief complaint of a recent biopsy showing basal cell carcinoma. Patient had a biopsy done by his PCP on 6/4/24 of a lesion on the right superior shoulder, came back positive for above. Here for treatment options. No issues to since biopsy was done. Denies any history of previous skin cancer. Has several scaly growths on the skin, including one on the lower right leg. But denies any rapidly growing, bleeding or non healing skin lesions.     Also with nail fungus and athlete's foot mostly on the left foot. Persistent and getting worse. Previous oral lamisil helped in the past.       Lesion        Review of Systems   Constitutional:  Negative for fever and chills.   Skin:  Negative for itching, rash and dry skin.        Objective:    Physical Exam   Constitutional: He appears well-developed and well-nourished. No distress.   Neurological: He is alert and oriented to person, place, and time. He is not disoriented.   Psychiatric: He has a normal mood and affect.   Skin:   Areas Examined (abnormalities noted in diagram):   Scalp / Hair Palpated and Inspected  Head / Face Inspection Performed  Neck Inspection Performed  Chest / Axilla Inspection Performed  Abdomen Inspection Performed  Back Inspection Performed  RUE Inspected  LUE Inspection Performed  RLE Inspected  LLE Inspection Performed  Nails and Digits Inspection Performed                  Diagram Legend     Erythematous scaling macule/papule c/w actinic keratosis       Vascular papule c/w angioma      Pigmented verrucoid papule/plaque c/w seborrheic keratosis      Yellow umbilicated papule c/w sebaceous hyperplasia      Irregularly shaped tan macule c/w lentigo     1-2 mm smooth white papules consistent with Milia      Movable subcutaneous cyst with punctum c/w epidermal  inclusion cyst      Subcutaneous movable cyst c/w pilar cyst      Firm pink to brown papule c/w dermatofibroma      Pedunculated fleshy papule(s) c/w skin tag(s)      Evenly pigmented macule c/w junctional nevus     Mildly variegated pigmented, slightly irregular-bordered macule c/w mildly atypical nevus      Flesh colored to evenly pigmented papule c/w intradermal nevus       Pink pearly papule/plaque c/w basal cell carcinoma      Erythematous hyperkeratotic cursted plaque c/w SCC      Surgical scar with no sign of skin cancer recurrence      Open and closed comedones      Inflammatory papules and pustules      Verrucoid papule consistent consistent with wart     Erythematous eczematous patches and plaques     Dystrophic onycholytic nail with subungual debris c/w onychomycosis     Umbilicated papule    Erythematous-base heme-crusted tan verrucoid plaque consistent with inflamed seborrheic keratosis     Erythematous Silvery Scaling Plaque c/w Psoriasis     See annotation      Assessment / Plan:        Basal cell carcinoma (BCC) - right superior shoulder. Will schedule for removal.     Actinic keratosis  Cryosurgery Procedure Note    Verbal consent from the patient is obtained including, but not limited to, risk of hypopigmentation/hyperpigmentation, scar, recurrence of lesion. The patient is aware of the precancerous quality and need for treatment of these lesions. Liquid nitrogen cryosurgery is applied to the 1 actinic keratoses, as detailed in the physical exam, to produce a freeze injury. The patient is aware that blisters may form and is instructed on wound care with gentle cleansing and use of vaseline ointment to keep moist until healed. The patient is supplied a handout on cryosurgery and is instructed to call if lesions do not completely resolve.    Seborrheic keratoses  These are benign inherited growths without a malignant potential. Reassurance given to patient. No treatment is necessary.      Onychomycosis   -     terbinafine HCL (LAMISIL) 250 mg tablet; Take 1 tablet (250 mg total) by mouth once daily.  Dispense: 90 tablet; Refill: 0  Discussed treatment options with patient including benefits and risks of p.o. Lamisil (75% of patients clear but 50% recur within 2 years, hepatotoxicity) vs topical treatments.             Follow up in about 3 months (around 9/25/2024).

## 2024-06-26 NOTE — PATIENT INSTRUCTIONS

## 2024-07-16 ENCOUNTER — PROCEDURE VISIT (OUTPATIENT)
Dept: DERMATOLOGY | Facility: CLINIC | Age: 59
End: 2024-07-16
Payer: COMMERCIAL

## 2024-07-16 ENCOUNTER — TELEPHONE (OUTPATIENT)
Dept: DERMATOLOGY | Facility: CLINIC | Age: 59
End: 2024-07-16

## 2024-07-16 VITALS — BODY MASS INDEX: 39.33 KG/M2 | HEIGHT: 72 IN | WEIGHT: 290.38 LBS

## 2024-07-16 DIAGNOSIS — C44.91 BASAL CELL CARCINOMA (BCC), UNSPECIFIED SITE: ICD-10-CM

## 2024-07-16 PROCEDURE — 12032 INTMD RPR S/A/T/EXT 2.6-7.5: CPT | Mod: 51,S$GLB,, | Performed by: STUDENT IN AN ORGANIZED HEALTH CARE EDUCATION/TRAINING PROGRAM

## 2024-07-16 PROCEDURE — 11622 EXC S/N/H/F/G MAL+MRG 1.1-2: CPT | Mod: S$GLB,,, | Performed by: STUDENT IN AN ORGANIZED HEALTH CARE EDUCATION/TRAINING PROGRAM

## 2024-07-16 PROCEDURE — 88305 TISSUE EXAM BY PATHOLOGIST: CPT | Performed by: PATHOLOGY

## 2024-07-16 PROCEDURE — 99499 UNLISTED E&M SERVICE: CPT | Mod: S$GLB,,, | Performed by: STUDENT IN AN ORGANIZED HEALTH CARE EDUCATION/TRAINING PROGRAM

## 2024-07-16 NOTE — PATIENT INSTRUCTIONS
Post- Operative Wound Care    Your doctor has performed local skin surgery today.  Vaseline ointment and a pressure bandage were placed after the surgery.  It is very important that you keep this bandage in place for 24 hours.  This will decrease the risk of post - operative infection and bleeding.  After 24 hours, you may remove the band aid and wash the area with warm soap and water and apply Vaseline ointment.  Many patients prefer to use Neosporin or Bacitracin ointment.  This is acceptable; however know that you can develop an allergy to this medication even if you have used it safely for years.  It is important to keep the area moist.  Letting it dry out and get air slows healing time, will worsen the scar, and make it more difficult to remove the stitches.  Band aid is optional after first 24 hours.    It is best NOT to use hydrogen peroxide or antibacterial soap to wash the operative site.       If you notice increasing redness, tenderness, pain, or yellow drainage at the biopsy or surgical site, please notify your doctor.  These are signs of an infection.    If your biopsy/surgical site is bleeding, apply firm pressure for 15 minutes straight.  Repeat for another 15 minutes, if it is still bleeding.   If the surgical site continues to bleed, then please contact your doctor.      For [facility] users:   You will receive your pathology results in [facility] as soon as they are available. Please be assured that your physician/provider will review your results and contact you should additional treatment be required. This is one more way [facility] is putting you first.

## 2024-07-16 NOTE — TELEPHONE ENCOUNTER
----- Message from Aida Washington sent at 7/16/2024  2:27 PM CDT -----  Contact: Levy Franco is calling to receive a call back at .[phone]. Reports after procedure was told meds would be sent to [facility]. Nothing shows in pts chart, pt needing clarification.

## 2024-07-16 NOTE — TELEPHONE ENCOUNTER
Returned pt phone call to inform pt that Dr. Villasenor was not prescribing any medication after visit. Pt verbalized understanding.

## 2024-07-16 NOTE — PROGRESS NOTES
PROCEDURE: Elliptical excision with intermediate layered repair    ANESTHETIC: 6 cc 1% Lidocaine with Epinephrine 1:100,000    SURGICAL PREP: Betadine and EtOH    SURGEON: Dillan Villasenor MD    ASSISTANTS:  Zaira Tipton MA      PREOPERATIVE DIAGNOSIS: BCC    POSTOPERATIVE DIAGNOSIS: BCC    PATHOLOGIC DIAGNOSIS: Pending    LOCATION: right shoulder    INITIAL LESION SIZE: 1.5 cm    EXCISED DIAMETER: 1.9 cm    PREPARATION:  The diagnosis, procedure, alternatives, benefits and risks, including but not limited to: drug reactions, pain, scar or cosmetic defect, local sensation disturbances, and/or recurrence of present condition were explained to the patient. The patient elected to proceed.    PROCEDURE:  The area of the right shoulder was prepped, draped, and anesthetized in the usual sterile fashion. Lesional tissue was carefully marked prior to administration of the anesthesia. An elliptical excision was drawn around the lesion with margins. A fusiform elliptical excision was done with a #15 blade carried down completely through the dermis into the subcutaneous tissues. Then, with a combination of blunt and sharp dissection, the lesion was removed.  The specimen was marked at the 12 o'clock position with suture and submitted for histologic evaluation. The operative site was widely undermined in the subcutaneous tissue plane. Then, electrocoagulation was used to obtain hemostasis. Blood loss was minimal. The wound was then approximated in a layered fashion with subcutaneous and intradermal 3-0 Vicryl sutures. The wound was then superficially closed with running 3-0 Ethilon sutures.    The patient tolerated the procedure well.    The area was cleaned and dressed appropriately and the patient was given wound care instructions, as well as an appointment for follow-up evaluation.    LENGTH OF REPAIR: 4 cm

## 2024-07-22 LAB
FINAL PATHOLOGIC DIAGNOSIS: NORMAL
GROSS: NORMAL
Lab: NORMAL
MICROSCOPIC EXAM: NORMAL

## 2024-07-26 ENCOUNTER — CLINICAL SUPPORT (OUTPATIENT)
Dept: DERMATOLOGY | Facility: CLINIC | Age: 59
End: 2024-07-26
Payer: COMMERCIAL

## 2024-07-26 DIAGNOSIS — Z48.89 SUTURE CHECK: Primary | ICD-10-CM

## 2024-07-26 PROCEDURE — 99999 PR PBB SHADOW E&M-EST. PATIENT-LVL I: CPT | Mod: PBBFAC,,,

## 2024-09-17 ENCOUNTER — OFFICE VISIT (OUTPATIENT)
Dept: ORTHOPEDICS | Facility: CLINIC | Age: 59
End: 2024-09-17
Payer: COMMERCIAL

## 2024-09-17 VITALS — BODY MASS INDEX: 39.33 KG/M2 | WEIGHT: 290.38 LBS | HEIGHT: 72 IN

## 2024-09-17 DIAGNOSIS — D23.9 NODULAR HIDRADENOMA: Primary | ICD-10-CM

## 2024-09-17 PROCEDURE — 99999 PR PBB SHADOW E&M-EST. PATIENT-LVL II: CPT | Mod: PBBFAC,,, | Performed by: ORTHOPAEDIC SURGERY

## 2024-09-17 PROCEDURE — 99214 OFFICE O/P EST MOD 30 MIN: CPT | Mod: S$GLB,,, | Performed by: ORTHOPAEDIC SURGERY

## 2024-09-17 NOTE — PROGRESS NOTES
Subjective:     Patient ID: Levy Mckoy is a 59 y.o. male.    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  He has had recurrence and wishes to have re-excision    Past Medical History:   Diagnosis Date    BPH (benign prostatic hyperplasia)     Low testosterone     Pre-diabetes     Sleep apnea, unspecified      Past Surgical History:   Procedure Laterality Date    APPENDECTOMY      CYST REMOVAL      CYST REMOVAL Right 4/11/2024    Procedure: EXCISION, CYST;  Surgeon: Seamus Logan MD;  Location: [facility];  Service: Orthopedics;  Laterality: Right;  soft tissue mass excised right volar wrist    right ankle Right      Family History   Problem Relation Name Age of Onset    Cancer Mother          Breast    Heart disease Father      Diabetes Father      Coronary artery disease Father  72     Social History     Socioeconomic History    Marital status:    Occupational History    Occupation: Site safety and health officer   Tobacco Use    Smoking status: Never     Passive exposure: Never    Smokeless tobacco: Never   Substance and Sexual Activity    Alcohol use: Yes     Comment: very rare    Drug use: No    Sexual activity: Yes     Partners: Female     Medication List with Changes/Refills   Current Medications    HYDROCODONE-ACETAMINOPHEN (NORCO) 5-325 MG PER TABLET    Take 1 tablet by mouth every 6 (six) hours as needed for Pain.    TADALAFIL (CIALIS) 20 MG TAB    Take 1 tablet (20 mg total) by mouth once daily.    TESTOSTERONE CYPIONATE (DEPOTESTOTERONE CYPIONATE) 200 MG/ML INJECTION    Inject 1 mL (200 mg total) into the muscle every 14 (fourteen) days. INJECT 1ML INTO THE MUSCLE EVERY 21 DAYS    TIRZEPATIDE, WEIGHT LOSS, (ZEPBOUND) 7.5 MG/0.5 ML PNIJ    Inject 7.5 mg into the skin every 7 days.    TRIAMCINOLONE ACETONIDE 0.1% (KENALOG) 0.1 % OINTMENT    Apply topically 2 (two) times daily.      Review of patient's allergies indicates:  No Known Allergies  Review of Systems   Constitutional: Negative for malaise/fatigue.   HENT:  Negative for hearing loss.    Eyes:  Negative for double vision and visual disturbance.   Cardiovascular:  Negative for chest pain.   Respiratory:  Positive for sleep disturbances due to breathing. Negative for shortness of breath.    Endocrine: Negative for cold intolerance.   Hematologic/Lymphatic: Does not bruise/bleed easily.   Skin:  Negative for poor wound healing and suspicious lesions.   Musculoskeletal:  Negative for gout, joint pain and joint swelling.   Gastrointestinal:  Negative for nausea and vomiting.   Genitourinary:  Negative for dysuria.   Neurological:  Negative for numbness, paresthesias and sensory change.   Psychiatric/Behavioral:  Negative for depression, memory loss and substance abuse. The patient is not nervous/anxious.    Allergic/Immunologic: Negative for persistent infections.       Objective:   Body mass index is 39.38 kg/m².  There were no vitals filed for this visit.             General    Constitutional: He is oriented to person, place, and time. He appears well-developed and well-nourished. No distress.   HENT:   Head: Normocephalic.   Eyes: EOM are normal.   Pulmonary/Chest: Effort normal.   Neurological: He is oriented to person, place, and time.   Psychiatric: He has a normal mood and affect.             Right Hand/Wrist Exam     Inspection   Scars: Wrist - absent Hand -  present  Effusion: Wrist - absent Hand -  absent    Other     Neuorologic Exam    Median Distribution: normal  Ulnar Distribution: normal  Radial Distribution: normal    Comments:  There is a 1 cm mass right palm in the scar in the volar mid palm          Vascular Exam       Capillary Refill  Right Hand: normal capillary refill       radiographs were not obtained today  Assessment:     Encounter Diagnosis   Name Primary?    Nodular hidradenoma Yes    Right palm    Plan:      The patient was counseled regarding excision hidradenoma right hand.  Risk complications and alternatives were discussed including the risk of infection, anesthetic risk, injury to nerves and vessels, loss of motion, and possible need for additional surgeries were discussed.  He seems to understand and agree to that surgery.  All questions were answered.                Disclaimer: This note was prepared using a voice recognition system and is likely to have sound alike errors within the text.

## 2024-09-19 ENCOUNTER — LAB VISIT (OUTPATIENT)
Dept: LAB | Facility: HOSPITAL | Age: 59
End: 2024-09-19
Attending: UROLOGY
Payer: COMMERCIAL

## 2024-09-19 ENCOUNTER — OFFICE VISIT (OUTPATIENT)
Dept: UROLOGY | Facility: CLINIC | Age: 59
End: 2024-09-19
Payer: COMMERCIAL

## 2024-09-19 VITALS
WEIGHT: 286.63 LBS | HEART RATE: 94 BPM | SYSTOLIC BLOOD PRESSURE: 138 MMHG | BODY MASS INDEX: 38.82 KG/M2 | RESPIRATION RATE: 16 BRPM | DIASTOLIC BLOOD PRESSURE: 92 MMHG | HEIGHT: 72 IN

## 2024-09-19 DIAGNOSIS — E29.1 HYPOGONADISM IN MALE: Primary | ICD-10-CM

## 2024-09-19 DIAGNOSIS — E29.1 HYPOGONADISM IN MALE: ICD-10-CM

## 2024-09-19 LAB — HGB BLD-MCNC: 16.2 G/DL (ref 14–18)

## 2024-09-19 PROCEDURE — 82670 ASSAY OF TOTAL ESTRADIOL: CPT | Performed by: UROLOGY

## 2024-09-19 PROCEDURE — 99214 OFFICE O/P EST MOD 30 MIN: CPT | Mod: S$GLB,,, | Performed by: UROLOGY

## 2024-09-19 PROCEDURE — 85018 HEMOGLOBIN: CPT | Performed by: UROLOGY

## 2024-09-19 PROCEDURE — 36415 COLL VENOUS BLD VENIPUNCTURE: CPT | Performed by: UROLOGY

## 2024-09-19 PROCEDURE — 99999 PR PBB SHADOW E&M-EST. PATIENT-LVL IV: CPT | Mod: PBBFAC,,, | Performed by: UROLOGY

## 2024-09-19 PROCEDURE — 80076 HEPATIC FUNCTION PANEL: CPT | Performed by: UROLOGY

## 2024-09-19 NOTE — PROGRESS NOTES
Chief Complaint:  Low testosterone    HPI:   09/19/2024 - returns today for follow-up, no new issues in the interim, testosterone working but still having issues with the ED, wonders what his estrogen levels are, no gross hematuria or dysuria    03/12/2024 - returns today for follow-up, no new issues in the interim, testosterone working well but has been off of it for about six weeks, no new voiding issues, ED stable, sometimes Cialis works, sometimes it does not, had labs late last month which were appropriate    04/19/2022 - returns today for follow-up, testosterone going well, viagra working but notes issues with timing restrictions, wants to try cialis, due for labs    09/21/2021 - patient returns today for follow-up, he has been taking the Clomid as prescribed for about a month and his testosterone level increased to 555, he notes some improvement in his symptoms but not significant, is interested in proceeding with testosterone injections    08/04/2021 - 59 y.o. male that presents for low testosterone. Patient notes about a 1 year history of a fatigue, weight gain, low energy, and ED.  he was talking with 1 of his friends who noted he should get his testosterone checked.  Per patient's report, testosterone level was around 100, [facility] not have this listed.  PSA 2.3, serum creatinine normal.  Patient denies any voiding difficulty, denies gross hematuria, denies a prior urologic procedures, denies family history of  cancers.    PMH:  Past Medical History:   Diagnosis Date    BPH (benign prostatic hyperplasia)     Low testosterone     Pre-diabetes     Sleep apnea, unspecified        PSH:  Past Surgical History:   Procedure Laterality Date    APPENDECTOMY      CYST REMOVAL      CYST REMOVAL Right 4/11/2024    Procedure: EXCISION, CYST;  Surgeon: Seamus Logan MD;  Location: [facility];  Service: Orthopedics;  Laterality: Right;  soft tissue mass excised right  volar wrist    right ankle Right        Family History:  Family History   Problem Relation Name Age of Onset    Cancer Mother          Breast    Heart disease Father      Diabetes Father      Coronary artery disease Father  72       Social History:  Social History     Tobacco Use    Smoking status: Never     Passive exposure: Never    Smokeless tobacco: Never   Substance Use Topics    Alcohol use: Yes     Comment: very rare    Drug use: No        Review of Systems:  General: No fever, chills  Skin: No rashes  Chest:  Denies cough and sputum production  Heart: Denies chest pain  Resp: Denies dyspnea  Abdomen: Denies diarrhea, abdominal pain, hematemesis, or blood in stool.  Musculoskeletal: No joint stiffness or swelling. Denies back pain.  : see HPI  Neuro: no dizziness or weakness    Allergies:  Patient has no known allergies.    Medications:    Current Outpatient Medications:     testosterone cypionate (DEPOTESTOTERONE CYPIONATE) 200 mg/mL injection, Inject 1 mL (200 mg total) into the muscle every 14 (fourteen) days. INJECT 1ML INTO THE MUSCLE EVERY 21 DAYS, Disp: 5 mL, Rfl: 2    tirzepatide, weight loss, (ZEPBOUND) 7.5 mg/0.5 mL PnIj, Inject 7.5 mg into the skin every 7 days., Disp: 4 Pen, Rfl: 0    triamcinolone acetonide 0.1% (KENALOG) 0.1 % ointment, Apply topically 2 (two) times daily., Disp: 30 g, Rfl: 0    HYDROcodone-acetaminophen (NORCO) 5-325 mg per tablet, Take 1 tablet by mouth every 6 (six) hours as needed for Pain. (Patient not taking: Reported on 9/19/2024), Disp: 15 tablet, Rfl: 0    tadalafiL (CIALIS) 20 MG Tab, Take 1 tablet (20 mg total) by mouth once daily., Disp: 90 tablet, Rfl: 3  No current facility-administered medications for this visit.    Facility-Administered Medications Ordered in Other Visits:     chlorhexidine 0.12 % solution 10 mL, 10 mL, Mouth/Throat, On Call Procedure, Darlin Meyer PA-C    Physical Exam:  Vitals:    09/19/24 1421   BP: (!) 138/92   Pulse: 94   Resp: 16      Body mass index is 38.87 kg/m².  General: awake, alert, cooperative  Head: NC/AT  Ears: external ears normal  Eyes: sclera normal  Lungs: normal inspiration, NAD  Heart: well-perfused  Abdomen: Soft, NT, ND   3/24: Normal circ'd phallus, meatus normal in size and position, BL testicles palpable, no masses, nontender, no abnormalities of epididymi, left-sided varicocele  IBAN 08/21: Normal rectal tone, no hemorrhoids. Prostate smooth and normal, no nodules 30 gm SV not palpable. Perineum and anus normal.  Skin: The skin is warm and dry  Ext: No c/c/e.  Neuro: grossly intact, AOx3    RADIOLOGY:  No recent relevant imaging available for review.    LABS:  I personally reviewed the following lab values:  Lab Results   Component Value Date    WBC 9.51 04/01/2024    HGB 15.6 04/01/2024    HCT 49.7 04/01/2024     04/01/2024     04/01/2024    K 4.4 04/01/2024     04/01/2024    CREATININE 1.2 04/01/2024    BUN 17 04/01/2024    CO2 24 04/01/2024    TSH 1.42 06/01/2011    HGBA1C 5.7 (H) 11/02/2023    CHOL 175 11/02/2023    TRIG 107 11/02/2023    HDL 39 (L) 11/02/2023    LDLDIRECT 144 (H) 02/14/2020    ALT 17 11/02/2023    AST 17 11/02/2023     Assessment/Plan:   Levy Mckoy is a 59 y.o. male with:    Low testosterone - continue T, labs today, if estradiol is high, will start anastrozole     ED - continue cialis, can take up to 40mg    Prostate Cancer Screening - PSA and IBAN normal, continue annual screening     - F/u 6 months    Adis Armando MD  Urology

## 2024-09-20 LAB
ALBUMIN SERPL BCP-MCNC: 4 G/DL (ref 3.5–5.2)
ALP SERPL-CCNC: 70 U/L (ref 55–135)
ALT SERPL W/O P-5'-P-CCNC: 16 U/L (ref 10–44)
AST SERPL-CCNC: 16 U/L (ref 10–40)
BILIRUB DIRECT SERPL-MCNC: 0.1 MG/DL (ref 0.1–0.3)
BILIRUB SERPL-MCNC: 0.4 MG/DL (ref 0.1–1)
ESTRADIOL SERPL-MCNC: 40 PG/ML (ref 11–44)
PROT SERPL-MCNC: 6.6 G/DL (ref 6–8.4)

## 2024-09-26 ENCOUNTER — PATIENT MESSAGE (OUTPATIENT)
Dept: PULMONOLOGY | Facility: CLINIC | Age: 59
End: 2024-09-26
Payer: COMMERCIAL

## 2024-09-29 RX ORDER — TIRZEPATIDE 10 MG/.5ML
INJECTION, SOLUTION SUBCUTANEOUS
Qty: 12 ML | Refills: 0 | OUTPATIENT
Start: 2024-09-29

## 2024-09-29 NOTE — TELEPHONE ENCOUNTER
Provider Staff:  Action required for this patient    Requires labs      Please see care gap opportunities below in Care Due Message.    Thanks!  [facility]     Appointments      Date Provider   Last Visit   6/4/2024 Michelle Bae MD   Next Visit   Visit date not found Michelle Bae MD     Refill Decision Note   Levy Mckoy  is requesting a refill authorization.  Brief Assessment and Rationale for Refill:  Quick Discontinue     Medication Therapy Plan:  prescription was discontinued on 6/20/2024 by Michelle Bae MD      Comments:     Note composed:5:17 PM 09/29/2024

## 2024-09-29 NOTE — TELEPHONE ENCOUNTER
Care Due:                  Date            Visit Type   Department     Provider  --------------------------------------------------------------------------------                                EP -                              PRIMARY      [facility] INTER[facility]  Michelle MENDOZA  Last Visit: 06-      CARE ([facility])   MEDICINE       Kathia  Next Visit: None Scheduled  None         None Found                                                            Last  Test          Frequency    Reason                     Performed    Due Date  --------------------------------------------------------------------------------    HBA1C.......  6 months...  tirzepatide,.............  11- 05-    [employer] Embedded Care Due Messages. Reference number: [id number].   9/29/2024 11:29:14 AM CDT

## 2024-09-30 ENCOUNTER — PATIENT MESSAGE (OUTPATIENT)
Dept: FAMILY MEDICINE | Facility: CLINIC | Age: 59
End: 2024-09-30
Payer: COMMERCIAL

## 2024-09-30 DIAGNOSIS — E29.1 HYPOGONADISM IN MALE: ICD-10-CM

## 2024-09-30 RX ORDER — TESTOSTERONE CYPIONATE 200 MG/ML
200 INJECTION, SOLUTION INTRAMUSCULAR
Qty: 5 ML | Refills: 2 | Status: SHIPPED | OUTPATIENT
Start: 2024-09-30

## 2024-09-30 RX ORDER — TIRZEPATIDE 10 MG/.5ML
10 INJECTION, SOLUTION SUBCUTANEOUS
Qty: 4 PEN | Refills: 0 | Status: SHIPPED | OUTPATIENT
Start: 2024-09-30

## 2024-10-28 RX ORDER — TIRZEPATIDE 10 MG/.5ML
10 INJECTION, SOLUTION SUBCUTANEOUS
Qty: 6 ML | Refills: 0 | Status: SHIPPED | OUTPATIENT
Start: 2024-10-28 | End: 2024-10-30 | Stop reason: SDUPTHER

## 2024-10-31 RX ORDER — TIRZEPATIDE 10 MG/.5ML
10 INJECTION, SOLUTION SUBCUTANEOUS
Qty: 6 ML | Refills: 0 | Status: SHIPPED | OUTPATIENT
Start: 2024-10-31

## 2024-11-03 ENCOUNTER — PATIENT MESSAGE (OUTPATIENT)
Dept: FAMILY MEDICINE | Facility: CLINIC | Age: 59
End: 2024-11-03
Payer: COMMERCIAL

## 2024-11-03 DIAGNOSIS — E66.01 SEVERE OBESITY (BMI 35.0-39.9) WITH COMORBIDITY: Primary | ICD-10-CM

## 2024-11-04 NOTE — TELEPHONE ENCOUNTER
No care due was identified.  [facility] Embedded Care Due Messages. Reference number: [id number].   11/04/2024 9:27:29 AM CST

## 2024-11-05 ENCOUNTER — TELEPHONE (OUTPATIENT)
Dept: FAMILY MEDICINE | Facility: CLINIC | Age: 59
End: 2024-11-05
Payer: COMMERCIAL

## 2024-11-05 RX ORDER — TIRZEPATIDE 12.5 MG/.5ML
12.5 INJECTION, SOLUTION SUBCUTANEOUS
Qty: 2 ML | Refills: 1 | Status: SHIPPED | OUTPATIENT
Start: 2024-11-05 | End: 2024-11-05 | Stop reason: SDUPTHER

## 2024-11-05 RX ORDER — TIRZEPATIDE 12.5 MG/.5ML
12.5 INJECTION, SOLUTION SUBCUTANEOUS
Qty: 2 ML | Refills: 1 | Status: SHIPPED | OUTPATIENT
Start: 2024-11-05

## 2024-11-05 NOTE — TELEPHONE ENCOUNTER
----- Message from [employer] sent at 11/5/2024  3:24 PM CST -----  Contact: self  .Type:  Needs Medical Advice    Who Called: .Levy Mckoy  Pharmacy name and phone #:  .  [id number] [place] - [address]  [address]  [facility] [address]  Phone: [phone] Fax: [phone]  Would the patient rather a call back or a response via [facility]? Call back   Best Call Back Number: .[phone]   Additional Information: pt states medication tirzepatide, weight loss, (ZEPBOUND) 12.5 mg/0.5 mL PnIj was sent to [facility] pharmacy

## 2024-11-30 ENCOUNTER — PATIENT MESSAGE (OUTPATIENT)
Dept: FAMILY MEDICINE | Facility: CLINIC | Age: 59
End: 2024-11-30
Payer: COMMERCIAL

## 2024-11-30 DIAGNOSIS — E66.01 SEVERE OBESITY (BMI 35.0-39.9) WITH COMORBIDITY: ICD-10-CM

## 2024-12-02 ENCOUNTER — PATIENT MESSAGE (OUTPATIENT)
Dept: UROLOGY | Facility: CLINIC | Age: 59
End: 2024-12-02
Payer: COMMERCIAL

## 2024-12-02 RX ORDER — TIRZEPATIDE 12.5 MG/.5ML
12.5 INJECTION, SOLUTION SUBCUTANEOUS
Qty: 2 ML | Refills: 1 | Status: CANCELLED | OUTPATIENT
Start: 2024-12-02

## 2024-12-02 RX ORDER — TIRZEPATIDE 15 MG/.5ML
15 INJECTION, SOLUTION SUBCUTANEOUS
Qty: 12 PEN | Refills: 0 | Status: SHIPPED | OUTPATIENT
Start: 2024-12-02

## 2024-12-02 NOTE — TELEPHONE ENCOUNTER
Care Due:                  Date            Visit Type   Department     Provider  --------------------------------------------------------------------------------                                EP -                              PRIMARY      [facility] INTERNAL  Michelle MENDOZA  Last Visit: 06-      CARE ([facility])   MEDICINE       Kathia  Next Visit: None Scheduled  None         None Found                                                            Last  Test          Frequency    Reason                     Performed    Due Date  --------------------------------------------------------------------------------    HBA1C.......  6 months...  tirzepatide,.............  11- 05-    [employer] Embedded Care Due Messages. Reference number: [id number].   12/02/2024 10:59:14 AM CST

## 2024-12-17 ENCOUNTER — TELEPHONE (OUTPATIENT)
Dept: UROLOGY | Facility: CLINIC | Age: 59
End: 2024-12-17
Payer: COMMERCIAL

## 2024-12-17 ENCOUNTER — PATIENT MESSAGE (OUTPATIENT)
Dept: UROLOGY | Facility: CLINIC | Age: 59
End: 2024-12-17
Payer: COMMERCIAL

## 2024-12-17 NOTE — TELEPHONE ENCOUNTER
Cynthia is checking on patient       ----- Message from Nydia sent at 12/17/2024 10:51 AM CST -----  Contact: Levy Lockett would like a call back at [phone] in regards to having multiple messages sent through the [employer] jennifer due to needing a PA for medication,testosterone cypionate (DEPOTESTOTERONE CYPIONATE) 200 mg/mL injection. Patient also wanted to know if he needed to come in for updated lab work before the Pa can be sent to the pharmacy.  Thanks   Am

## 2024-12-31 DIAGNOSIS — N52.9 ERECTILE DYSFUNCTION, UNSPECIFIED ERECTILE DYSFUNCTION TYPE: ICD-10-CM

## 2025-01-08 DIAGNOSIS — N52.9 ERECTILE DYSFUNCTION, UNSPECIFIED ERECTILE DYSFUNCTION TYPE: ICD-10-CM

## 2025-01-10 RX ORDER — TADALAFIL 20 MG/1
20 TABLET ORAL
Qty: 90 TABLET | Refills: 0 | Status: SHIPPED | OUTPATIENT
Start: 2025-01-10

## 2025-01-13 RX ORDER — TADALAFIL 20 MG/1
20 TABLET ORAL DAILY
Qty: 90 TABLET | Refills: 3 | Status: SHIPPED | OUTPATIENT
Start: 2025-01-13 | End: 2026-01-13

## 2025-02-26 NOTE — TELEPHONE ENCOUNTER
Care Due:                  Date            Visit Type   Department     Provider  --------------------------------------------------------------------------------                                EP -                              PRIMARY      [facility] INTERNAL  Michelle MENDOZA  Last Visit: 06-      CARE ([facility])   MEDICINE       Kathia  Next Visit: None Scheduled  None         None Found                                                            Last  Test          Frequency    Reason                     Performed    Due Date  --------------------------------------------------------------------------------    HBA1C.......  6 months...  tirzepatide,.............  11- 05-    [employer] Embedded Care Due Messages. Reference number: [id number].   2/26/2025 9:29:34 AM CST

## 2025-02-26 NOTE — TELEPHONE ENCOUNTER
Refill Routing Note   Medication(s) are not appropriate for processing by [facility] for the following reason(s):        Required labs outdated  New or recently adjusted medication    [facility] action(s):  Defer   Requires labs : Yes             Appointments  past 12m or future 3m with PCP    Date Provider   Last Visit   6/4/2024 Michelle Bae MD   Next Visit   Visit date not found Michelle Bae MD   ED visits in past 90 days: 0        Note composed:12:17 PM 02/26/2025

## 2025-02-27 RX ORDER — TIRZEPATIDE 15 MG/.5ML
INJECTION, SOLUTION SUBCUTANEOUS
Qty: 12 PEN | Refills: 0 | Status: SHIPPED | OUTPATIENT
Start: 2025-02-27

## 2025-03-04 ENCOUNTER — TELEPHONE (OUTPATIENT)
Dept: FAMILY MEDICINE | Facility: CLINIC | Age: 60
End: 2025-03-04
Payer: COMMERCIAL

## 2025-03-07 ENCOUNTER — TELEPHONE (OUTPATIENT)
Dept: INTERNAL MEDICINE | Facility: CLINIC | Age: 60
End: 2025-03-07
Payer: COMMERCIAL

## 2025-03-07 ENCOUNTER — PATIENT MESSAGE (OUTPATIENT)
Dept: FAMILY MEDICINE | Facility: CLINIC | Age: 60
End: 2025-03-07
Payer: COMMERCIAL

## 2025-03-07 NOTE — TELEPHONE ENCOUNTER
Prior to initiating GLP1 therapy on 01/09/2023 pt's vitals were as follows:    Vitals: /72     Pulse 85     Resp 16     Ht 6' (1.829 m)     Wt 137.8 kg (303 lb 10.9 oz) Important      SpO2 96%     BMI 41.19 kg/m²     BSA 2.65 m²     Pts most recent vitals as of 09/19/2024 after initiating GLP1 therapy are:    Vitals: /92 Important   (BP Location: Left arm, Patient Position: Sitting)     Pulse 94     Resp 16     Ht 6' (1.829 m)     Wt 130 kg (286 lb 9.6 oz)     BMI 38.87 kg/m²     BSA 2.57 m²     Pain Sc 0-No pain

## 2025-03-07 NOTE — TELEPHONE ENCOUNTER
"As of patient's last office visit on 09/19/2024 pt's vital signs were as follows:    Ht: 6'0"    Wt: 286 lb 9.6 oz    BMI 38.8    T: 98.2    Pulse: 94    O2: 94%    Blood Pressure: 138/92  "

## 2025-03-10 RX ORDER — TIRZEPATIDE 15 MG/.5ML
15 INJECTION, SOLUTION SUBCUTANEOUS WEEKLY
Qty: 12 PEN | Refills: 0 | Status: SHIPPED | OUTPATIENT
Start: 2025-03-10

## 2025-03-10 NOTE — TELEPHONE ENCOUNTER
No care due was identified.  [facility] Embedded Care Due Messages. Reference number: [id number].   3/10/2025 7:57:22 AM CDT

## 2025-04-17 ENCOUNTER — OFFICE VISIT (OUTPATIENT)
Dept: UROLOGY | Facility: CLINIC | Age: 60
End: 2025-04-17
Payer: COMMERCIAL

## 2025-04-17 VITALS
HEART RATE: 77 BPM | WEIGHT: 275.44 LBS | DIASTOLIC BLOOD PRESSURE: 94 MMHG | BODY MASS INDEX: 37.31 KG/M2 | SYSTOLIC BLOOD PRESSURE: 144 MMHG | HEIGHT: 72 IN

## 2025-04-17 DIAGNOSIS — E29.1 HYPOGONADISM IN MALE: Primary | ICD-10-CM

## 2025-04-17 DIAGNOSIS — E29.1 HYPOGONADISM IN MALE: ICD-10-CM

## 2025-04-17 PROCEDURE — 99999 PR PBB SHADOW E&M-EST. PATIENT-LVL III: CPT | Mod: PBBFAC,,, | Performed by: UROLOGY

## 2025-04-17 PROCEDURE — 99214 OFFICE O/P EST MOD 30 MIN: CPT | Mod: S$GLB,,, | Performed by: UROLOGY

## 2025-04-17 RX ORDER — ANASTROZOLE 1 MG/1
1 TABLET ORAL WEEKLY
Qty: 12 TABLET | Refills: 3 | Status: SHIPPED | OUTPATIENT
Start: 2025-04-17 | End: 2026-04-12

## 2025-04-17 RX ORDER — TESTOSTERONE CYPIONATE 200 MG/ML
200 INJECTION, SOLUTION INTRAMUSCULAR
Qty: 5 ML | Refills: 2 | Status: SHIPPED | OUTPATIENT
Start: 2025-04-17

## 2025-04-17 NOTE — PROGRESS NOTES
Chief Complaint:  Low testosterone    HPI:   04/17/2025 - returns today for follow-up, no new issues in the interim, testosterone working well, ED persists, mostly issues with maintaining an erection, wonders if a ring would be helpful, estrogen level at last check was around 40, no gross hematuria or dysuria    09/19/2024 - returns today for follow-up, no new issues in the interim, testosterone working but still having issues with the ED, wonders what his estrogen levels are, no gross hematuria or dysuria    03/12/2024 - returns today for follow-up, no new issues in the interim, testosterone working well but has been off of it for about six weeks, no new voiding issues, ED stable, sometimes Cialis works, sometimes it does not, had labs late last month which were appropriate    04/19/2022 - returns today for follow-up, testosterone going well, viagra working but notes issues with timing restrictions, wants to try cialis, due for labs    09/21/2021 - patient returns today for follow-up, he has been taking the Clomid as prescribed for about a month and his testosterone level increased to 555, he notes some improvement in his symptoms but not significant, is interested in proceeding with testosterone injections    08/04/2021 - 60 y.o. male that presents for low testosterone. Patient notes about a 1 year history of a fatigue, weight gain, low energy, and ED.  he was talking with 1 of his friends who noted he should get his testosterone checked.  Per patient's report, testosterone level was around 100, his outside lab work from a [facility] does not have this listed.  PSA 2.3, serum creatinine normal.  Patient denies any voiding difficulty, denies gross hematuria, denies a prior urologic procedures, denies family history of  cancers.    PMH:  Past Medical History:   Diagnosis Date    BPH (benign prostatic hyperplasia)     Low testosterone     Pre-diabetes     Sleep apnea, unspecified        PSH:  Past Surgical  History:   Procedure Laterality Date    APPENDECTOMY      CYST REMOVAL      CYST REMOVAL Right 4/11/2024    Procedure: EXCISION, CYST;  Surgeon: Seamus Logan MD;  Location: [facility];  Service: Orthopedics;  Laterality: Right;  soft tissue mass excised right volar wrist    right ankle Right        Family History:  Family History   Problem Relation Name Age of Onset    Cancer Mother          Breast    Heart disease Father      Diabetes Father      Coronary artery disease Father  72       Social History:  Social History     Tobacco Use    Smoking status: Never     Passive exposure: Never    Smokeless tobacco: Never   Substance Use Topics    Alcohol use: Yes     Comment: very rare    Drug use: No        Review of Systems:  General: No fever, chills  Skin: No rashes  Chest:  Denies cough and sputum production  Heart: Denies chest pain  Resp: Denies dyspnea  Abdomen: Denies diarrhea, abdominal pain, hematemesis, or blood in stool.  Musculoskeletal: No joint stiffness or swelling. Denies back pain.  : see HPI  Neuro: no dizziness or weakness    Allergies:  Patient has no known allergies.    Medications:    Current Outpatient Medications:     tadalafiL (CIALIS) 20 MG Tab, Take 1 tablet by mouth once daily, Disp: 90 tablet, Rfl: 0    tadalafiL (CIALIS) 20 MG Tab, Take 1 tablet (20 mg total) by mouth once daily., Disp: 90 tablet, Rfl: 3    testosterone cypionate (DEPOTESTOTERONE CYPIONATE) 200 mg/mL injection, INJECT 1 ML INTRAMUSCULARLY EVERY 14 DAYS, Disp: 5 mL, Rfl: 2    tirzepatide, weight loss, (ZEPBOUND) 15 mg/0.5 mL PnIj, Inject 15 mg into the skin once a week., Disp: 12 Pen, Rfl: 0    triamcinolone acetonide 0.1% (KENALOG) 0.1 % ointment, Apply topically 2 (two) times daily., Disp: 30 g, Rfl: 0  No current facility-administered medications for this visit.    Facility-Administered Medications Ordered in Other Visits:     chlorhexidine 0.12 % solution 10 mL, 10 mL, Mouth/Throat, On Call Procedure, [place],  KELIN Saeed    Physical Exam:  Vitals:    04/17/25 1540   BP: (!) 144/94   Pulse: 77     Body mass index is 37.36 kg/m².  General: awake, alert, cooperative  Head: NC/AT  Ears: external ears normal  Eyes: sclera normal  Lungs: normal inspiration, NAD  Heart: well-perfused  Abdomen: Soft, NT, ND   3/24: Normal circ'd phallus, meatus normal in size and position, BL testicles palpable, no masses, nontender, no abnormalities of epididymi, left-sided varicocele  IBAN 08/21: Normal rectal tone, no hemorrhoids. Prostate smooth and normal, no nodules 30 gm SV not palpable. Perineum and anus normal.  Skin: The skin is warm and dry  Ext: No c/c/e.  Neuro: grossly intact, AOx3    RADIOLOGY:  No recent relevant imaging available for review.    LABS:  I personally reviewed the following lab values:  Lab Results   Component Value Date    WBC 9.51 04/01/2024    HGB 16.2 09/19/2024    HCT 49.7 04/01/2024     04/01/2024     04/01/2024    K 4.4 04/01/2024     04/01/2024    CREATININE 1.2 04/01/2024    BUN 17 04/01/2024    CO2 24 04/01/2024    TSH 1.42 06/01/2011    HGBA1C 5.7 (H) 11/02/2023    CHOL 175 11/02/2023    TRIG 107 11/02/2023    HDL 39 (L) 11/02/2023    LDLDIRECT 144 (H) 02/14/2020    ALT 16 09/19/2024    AST 16 09/19/2024     Assessment/Plan:   Levy Mckoy is a 60 y.o. male with:    Low testosterone - continue T, estradiol was high normal, start anastrozole    ED - continue cialis, can take up to 40mg, reviewed that a restrictive ring may be helpful, also discussed TriMix    Prostate Cancer Screening - PSA and IBAN normal, continue annual screening     - F/u 6 months    Adis Armando MD  Urology

## 2025-05-30 ENCOUNTER — TELEPHONE (OUTPATIENT)
Dept: FAMILY MEDICINE | Facility: CLINIC | Age: 60
End: 2025-05-30
Payer: COMMERCIAL

## 2025-05-30 NOTE — TELEPHONE ENCOUNTER
Copied from CRM #[id number]. Topic: Medications - Medication Status Check   >> May 30, 2025  2:43 PM Sylvie De Dios wrote:  .Type: Patient  Requesting Call Back      Who Called: Levy    What is this regarding?: refill requested for ZepBound    Would the patient rather a call back or a response via [facility]?  Call or Respond in Portal    Best Call Back Number: .[phone] (home)    Additional Information:  Patient is requesting call back in regard to status of refill request

## 2025-06-11 ENCOUNTER — OFFICE VISIT (OUTPATIENT)
Dept: FAMILY MEDICINE | Facility: CLINIC | Age: 60
End: 2025-06-11
Payer: COMMERCIAL

## 2025-06-11 VITALS
HEIGHT: 73 IN | OXYGEN SATURATION: 97 % | HEART RATE: 90 BPM | SYSTOLIC BLOOD PRESSURE: 128 MMHG | TEMPERATURE: 97 F | WEIGHT: 281.06 LBS | DIASTOLIC BLOOD PRESSURE: 82 MMHG | BODY MASS INDEX: 37.25 KG/M2

## 2025-06-11 DIAGNOSIS — E66.01 CLASS 3 SEVERE OBESITY DUE TO EXCESS CALORIES WITH SERIOUS COMORBIDITY AND BODY MASS INDEX (BMI) OF 40.0 TO 44.9 IN ADULT: Primary | ICD-10-CM

## 2025-06-11 DIAGNOSIS — Z12.11 COLON CANCER SCREENING: ICD-10-CM

## 2025-06-11 DIAGNOSIS — E66.813 CLASS 3 SEVERE OBESITY DUE TO EXCESS CALORIES WITH SERIOUS COMORBIDITY AND BODY MASS INDEX (BMI) OF 40.0 TO 44.9 IN ADULT: Primary | ICD-10-CM

## 2025-06-11 DIAGNOSIS — Z12.5 SCREENING FOR MALIGNANT NEOPLASM OF PROSTATE: ICD-10-CM

## 2025-06-11 DIAGNOSIS — R73.03 PRE-DIABETES: ICD-10-CM

## 2025-06-11 DIAGNOSIS — Z23 NEED FOR SHINGLES VACCINE: ICD-10-CM

## 2025-06-11 DIAGNOSIS — G47.33 OSA (OBSTRUCTIVE SLEEP APNEA): ICD-10-CM

## 2025-06-11 PROCEDURE — 99999 PR PBB SHADOW E&M-EST. PATIENT-LVL III: CPT | Mod: PBBFAC,,, | Performed by: NURSE PRACTITIONER

## 2025-06-11 RX ORDER — TIRZEPATIDE 15 MG/.5ML
15 INJECTION, SOLUTION SUBCUTANEOUS WEEKLY
Qty: 12 PEN | Refills: 1 | Status: SHIPPED | OUTPATIENT
Start: 2025-06-11

## 2025-06-11 NOTE — PROGRESS NOTES
Subjective:       Patient ID: Levy Mckoy is a 60 y.o. male.    Chief Complaint: Medication Refill    History of Present Illness    Patient presents today for follow up. He has lost 15 lbs since starting tracking at 277 lbs, with his home scale currently showing 261.6 lbs. Initial weight before treatment was 309 lbs. He reports feeling better to the point where discussing exercise no longer causes discomfort and feels capable of beginning an exercise routine. He continues Tirzepatide (Zepbound) 15 mg weekly, typically administered on Saturdays. He reports missing two doses. Side effects are minimal     ROS:  General: -fever, -chills, -fatigue, -weight gain, -weight loss  Eyes: -vision changes, -redness, -discharge  ENT: -ear pain, -nasal congestion, -sore throat, +ear pressure  Cardiovascular: -chest pain, -palpitations, -lower extremity edema  Respiratory: -cough, -shortness of breath  Gastrointestinal: -abdominal pain, -nausea, -vomiting, -diarrhea, -constipation, -blood in stool, +indigestion  Genitourinary: -dysuria, -hematuria, -frequency  Musculoskeletal: -joint pain, -muscle pain  Skin: -rash, -lesion  Neurological: -headache, -dizziness, -numbness, -tingling  Psychiatric: -anxiety, -depression, -sleep difficulty        Past Medical History:   Diagnosis Date    BPH (benign prostatic hyperplasia)     Low testosterone     Pre-diabetes     Sleep apnea, unspecified       Medications Ordered Prior to Encounter[1]       Objective:      Physical Exam    General: In no acute distress.  Head: Normocephalic. Non traumatic.  Eyes: PERRLA. EOMs full. Conjunctivae clear. Fundi grossly normal.  Ears: CERUMEN IMPACTION IN LEFT EAR. TMs normal.  Nose: Mucosa pink. Mucosa moist. No obstruction.  Throat: Clear. No exudates. No lesions.  Neck: Supple. No masses. No thyromegaly. No bruits.  Chest: Lungs clear. No rales. No rhonchi. No wheezes.  Heart: RRR. No murmurs. No rubs. No gallops.  Abdomen: Soft. No tenderness. No  masses. BS normal.  : Normal external genitalia. No lesions. No discharge. No hernias  noted.  Back: Normal curvature. No scoliosis. No tenderness.  Extremities: Warm. Well perfused. No upper extremity edema. No lower extremity edema. FROM. No deformities. No joint erythema.  Neuro: No focal deficits appreciated. Good muscle tone. Normal response to visual stimuli. Normal response to auditory stimuli.  Skin: Normal. No rashes. No lesions noted.  Vitals: WEIGHT: 281 LBS.          Assessment/Plan:     1. Class 3 severe obesity due to excess calories with serious comorbidity and body mass index (BMI) of 40.0 to 44.9 in adult    2. JULI (obstructive sleep apnea)    3. Pre-diabetes    4. Screening for malignant neoplasm of prostate    5. Colon cancer screening    6. Need for shingles vaccine       Assessment & Plan    IMPACTED CERUMEN, LEFT EAR:  - Observed impacted cerumen too far down to reach in the left ear causing fullness.  - Scheduled left ear irrigation to remove wax buildup.    OBESITY MANAGEMENT:  - Evaluated weight loss progress on tirzepatide (Zepbound) 15 mg, noting total loss of 15 lbs since starting treatment.  - Recorded the patient's weight at 261.6 lbs on a digital scale, with fluctuations between 261.6 and 281 lbs, acknowledging discrepancy with home scale measurements.  - Assessed for side effects including constipation, abdominal pain, and leg edema.  - Continued tirzepatide (Zepbound) 15 mg and instructed patient to contact the office if nausea occurs after restarting.  - Emphasized the importance of exercise, particularly walking in the neighborhood, to improve medication effectiveness and support weight loss efforts.  - Discussed another patient's successful weight loss journey for motivation.    IMMUNIZATION:  - Advised the patient to receive the second shingles shot today and defer the pneumonia vaccine.    LABORATORY AND DIAGNOSTIC TESTS:  - Ordered labs to be done at [facility]: PSA,  cholesterol, chemistry, CBC, and A1C.  - Ordered Cologuard test for colon cancer screening.    FOLLOW-UP:  - Scheduled follow up in 4 months to reassess weight and progress.               No follow-ups on file.    This note was generated with the assistance of ambient listening technology. Verbal consent was obtained by the patient and accompanying visitor(s) for the recording of patient appointment to facilitate this note. I attest to having reviewed and edited the generated note for accuracy, though some syntax or spelling errors may persist. Please contact the author of this note for any clarification.            [1]   Current Outpatient Medications on File Prior to Visit   Medication Sig Dispense Refill    anastrozole (ARIMIDEX) 1 mg Tab Take 1 tablet (1 mg total) by mouth once a week. 12 tablet 3    tadalafiL (CIALIS) 20 MG Tab Take 1 tablet by mouth once daily 90 tablet 0    testosterone cypionate (DEPOTESTOTERONE CYPIONATE) 200 mg/mL injection Inject 1 mL (200 mg total) into the muscle every 14 (fourteen) days. 5 mL 2    triamcinolone acetonide 0.1% (KENALOG) 0.1 % ointment Apply topically 2 (two) times daily. 30 g 0    [DISCONTINUED] tirzepatide, weight loss, (ZEPBOUND) 15 mg/0.5 mL PnIj Inject 15 mg into the skin once a week. 12 Pen 0    tadalafiL (CIALIS) 20 MG Tab Take 1 tablet (20 mg total) by mouth once daily. 90 tablet 3     Current Facility-Administered Medications on File Prior to Visit   Medication Dose Route Frequency Provider Last Rate Last Admin    chlorhexidine 0.12 % solution 10 mL  10 mL Mouth/Throat On Call Procedure Darlin Meyer PA-C

## 2025-06-17 ENCOUNTER — PATIENT MESSAGE (OUTPATIENT)
Dept: FAMILY MEDICINE | Facility: CLINIC | Age: 60
End: 2025-06-17
Payer: COMMERCIAL

## 2025-06-20 DIAGNOSIS — R79.89 ABNORMAL CBC: Primary | ICD-10-CM

## 2025-06-25 ENCOUNTER — TELEPHONE (OUTPATIENT)
Dept: UROLOGY | Facility: CLINIC | Age: 60
End: 2025-06-25
Payer: COMMERCIAL

## 2025-06-25 ENCOUNTER — RESULTS FOLLOW-UP (OUTPATIENT)
Dept: FAMILY MEDICINE | Facility: CLINIC | Age: 60
End: 2025-06-25

## 2025-06-30 DIAGNOSIS — E29.1 HYPOGONADISM IN MALE: ICD-10-CM

## 2025-06-30 DIAGNOSIS — N52.9 ERECTILE DYSFUNCTION, UNSPECIFIED ERECTILE DYSFUNCTION TYPE: ICD-10-CM

## 2025-07-08 RX ORDER — TADALAFIL 20 MG/1
20 TABLET ORAL DAILY
Qty: 90 TABLET | Refills: 3 | Status: SHIPPED | OUTPATIENT
Start: 2025-07-08 | End: 2026-07-08

## 2025-07-08 RX ORDER — ANASTROZOLE 1 MG/1
1 TABLET ORAL WEEKLY
Qty: 12 TABLET | Refills: 3 | Status: SHIPPED | OUTPATIENT
Start: 2025-07-08 | End: 2026-07-03

## 2025-08-11 ENCOUNTER — PATIENT MESSAGE (OUTPATIENT)
Dept: FAMILY MEDICINE | Facility: CLINIC | Age: 60
End: 2025-08-11
Payer: COMMERCIAL

## 2025-08-12 ENCOUNTER — PATIENT MESSAGE (OUTPATIENT)
Dept: FAMILY MEDICINE | Facility: CLINIC | Age: 60
End: 2025-08-12
Payer: COMMERCIAL

## (undated) DEVICE — SYR 10CC LUER LOCK

## (undated) DEVICE — PAD ABDOMINAL STERILE 8X10IN

## (undated) DEVICE — GOWN NONREINF SET-IN SLV 2XL

## (undated) DEVICE — UNDERGLOVES BIOGEL PI SIZE 7.5

## (undated) DEVICE — DRAPE U SPLIT SHEET 54X76IN

## (undated) DEVICE — SPONGE COTTON TRAY 4X4IN

## (undated) DEVICE — BANDAGE ELASTIC 3X5 VELCRO ST

## (undated) DEVICE — KIT TURNOVER

## (undated) DEVICE — TOURNIQUET SB QC DP 18X4IN

## (undated) DEVICE — DRAPE HAND STERILE

## (undated) DEVICE — APPLICATOR CHLORAPREP ORN 26ML

## (undated) DEVICE — COVER LIGHT HANDLE 80/CA

## (undated) DEVICE — NDL SAFETY 25G X 1.5 ECLIPSE

## (undated) DEVICE — GLOVE BIOGEL SZ 8 1/2

## (undated) DEVICE — TOWEL OR DISP STRL BLUE 4/PK

## (undated) DEVICE — PAD CAST SPECIALIST STRL 3

## (undated) DEVICE — SUPPORT ULNA NERVE PROTECTOR

## (undated) DEVICE — SOL IRR SOD CHL .9% POUR

## (undated) DEVICE — DRAPE THREE-QTR REINF 53X77IN

## (undated) DEVICE — GOWN POLY REINF BRTH SLV XL

## (undated) DEVICE — UNDERGLOVES BIOGEL PI SZ 7 LF

## (undated) DEVICE — SUT 4-0 ETHILON 18 PS-2

## (undated) DEVICE — GLOVE SURGICAL LATEX SZ 7

## (undated) DEVICE — UNDERGLOVES BIOGEL PI SIZE 8.5

## (undated) DEVICE — BANDAGE ESMARK ELASTIC ST 4X9

## (undated) DEVICE — POSITIONER HEAD DONUT 9IN FOAM

## (undated) DEVICE — ALCOHOL 70% ISOP RUBBING 4OZ

## (undated) DEVICE — COVER CAMERA OPERATING ROOM

## (undated) DEVICE — DRESSING XEROFORM NONADH 1X8IN

## (undated) DEVICE — SCRUB DYNA-HEX LIQ 4% CHG 4OZ

## (undated) DEVICE — KIT COLLECTION E SWAB REGULAR

## (undated) DEVICE — PACK BASIC SETUP SC BR